# Patient Record
Sex: FEMALE | Race: WHITE | Employment: OTHER | URBAN - METROPOLITAN AREA
[De-identification: names, ages, dates, MRNs, and addresses within clinical notes are randomized per-mention and may not be internally consistent; named-entity substitution may affect disease eponyms.]

---

## 2017-05-01 ENCOUNTER — OFFICE VISIT (OUTPATIENT)
Dept: INTERNAL MEDICINE | Age: 77
End: 2017-05-01

## 2017-05-01 VITALS
TEMPERATURE: 98.6 F | BODY MASS INDEX: 30.58 KG/M2 | DIASTOLIC BLOOD PRESSURE: 80 MMHG | OXYGEN SATURATION: 98 % | RESPIRATION RATE: 14 BRPM | SYSTOLIC BLOOD PRESSURE: 132 MMHG | HEIGHT: 63 IN | HEART RATE: 80 BPM | WEIGHT: 172.6 LBS

## 2017-05-01 DIAGNOSIS — L30.1 DYSHIDROTIC ECZEMA: ICD-10-CM

## 2017-05-01 DIAGNOSIS — I10 ESSENTIAL HYPERTENSION: Primary | ICD-10-CM

## 2017-05-01 DIAGNOSIS — E03.4 HYPOTHYROIDISM DUE TO ACQUIRED ATROPHY OF THYROID: ICD-10-CM

## 2017-05-01 DIAGNOSIS — I65.23 BILATERAL CAROTID ARTERY OCCLUSION: ICD-10-CM

## 2017-05-01 DIAGNOSIS — R53.83 FATIGUE, UNSPECIFIED TYPE: ICD-10-CM

## 2017-05-01 DIAGNOSIS — E55.9 HYPOVITAMINOSIS D: ICD-10-CM

## 2017-05-01 DIAGNOSIS — E78.2 MIXED HYPERLIPIDEMIA: ICD-10-CM

## 2017-05-01 LAB
ALBUMIN SERPL-MCNC: 4.8 G/DL (ref 3.9–4.9)
ALP BLD-CCNC: 62 U/L (ref 40–130)
ALT SERPL-CCNC: 13 U/L (ref 0–33)
ANION GAP SERPL CALCULATED.3IONS-SCNC: 9 MEQ/L (ref 7–13)
AST SERPL-CCNC: 21 U/L (ref 0–35)
BASOPHILS ABSOLUTE: 0.1 K/UL (ref 0–0.2)
BASOPHILS RELATIVE PERCENT: 1 %
BILIRUB SERPL-MCNC: 0.6 MG/DL (ref 0–1.2)
BUN BLDV-MCNC: 17 MG/DL (ref 8–23)
CALCIUM SERPL-MCNC: 9.5 MG/DL (ref 8.6–10.2)
CHLORIDE BLD-SCNC: 101 MEQ/L (ref 98–107)
CHOLESTEROL, TOTAL: 195 MG/DL (ref 0–199)
CO2: 28 MEQ/L (ref 22–29)
CREAT SERPL-MCNC: 0.79 MG/DL (ref 0.5–0.9)
EOSINOPHILS ABSOLUTE: 0.1 K/UL (ref 0–0.7)
EOSINOPHILS RELATIVE PERCENT: 2.5 %
GFR AFRICAN AMERICAN: >60
GFR NON-AFRICAN AMERICAN: >60
GLOBULIN: 2.2 G/DL (ref 2.3–3.5)
GLUCOSE BLD-MCNC: 105 MG/DL (ref 74–109)
HCT VFR BLD CALC: 39.2 % (ref 37–47)
HDLC SERPL-MCNC: 87 MG/DL (ref 40–59)
HEMOGLOBIN: 13.2 G/DL (ref 12–16)
LDL CHOLESTEROL CALCULATED: 89 MG/DL (ref 0–129)
LYMPHOCYTES ABSOLUTE: 1.2 K/UL (ref 1–4.8)
LYMPHOCYTES RELATIVE PERCENT: 21.7 %
MCH RBC QN AUTO: 30.4 PG (ref 27–31.3)
MCHC RBC AUTO-ENTMCNC: 33.6 % (ref 33–37)
MCV RBC AUTO: 90.5 FL (ref 82–100)
MONOCYTES ABSOLUTE: 0.6 K/UL (ref 0.2–0.8)
MONOCYTES RELATIVE PERCENT: 10.1 %
NEUTROPHILS ABSOLUTE: 3.7 K/UL (ref 1.4–6.5)
NEUTROPHILS RELATIVE PERCENT: 64.7 %
PDW BLD-RTO: 13.3 % (ref 11.5–14.5)
PLATELET # BLD: 181 K/UL (ref 130–400)
POTASSIUM SERPL-SCNC: 4.5 MEQ/L (ref 3.5–5.1)
RBC # BLD: 4.33 M/UL (ref 4.2–5.4)
SODIUM BLD-SCNC: 138 MEQ/L (ref 132–144)
TOTAL PROTEIN: 7 G/DL (ref 6.4–8.1)
TRIGL SERPL-MCNC: 93 MG/DL (ref 0–200)
TSH SERPL DL<=0.05 MIU/L-ACNC: 1.64 UIU/ML (ref 0.27–4.2)
VITAMIN D 25-HYDROXY: 18.2 NG/ML (ref 30–100)
WBC # BLD: 5.6 K/UL (ref 4.8–10.8)

## 2017-05-01 PROCEDURE — 1123F ACP DISCUSS/DSCN MKR DOCD: CPT | Performed by: FAMILY MEDICINE

## 2017-05-01 PROCEDURE — G8399 PT W/DXA RESULTS DOCUMENT: HCPCS | Performed by: FAMILY MEDICINE

## 2017-05-01 PROCEDURE — G8417 CALC BMI ABV UP PARAM F/U: HCPCS | Performed by: FAMILY MEDICINE

## 2017-05-01 PROCEDURE — 1090F PRES/ABSN URINE INCON ASSESS: CPT | Performed by: FAMILY MEDICINE

## 2017-05-01 PROCEDURE — 99213 OFFICE O/P EST LOW 20 MIN: CPT | Performed by: FAMILY MEDICINE

## 2017-05-01 PROCEDURE — 4040F PNEUMOC VAC/ADMIN/RCVD: CPT | Performed by: FAMILY MEDICINE

## 2017-05-01 PROCEDURE — G8427 DOCREV CUR MEDS BY ELIG CLIN: HCPCS | Performed by: FAMILY MEDICINE

## 2017-05-01 PROCEDURE — 1036F TOBACCO NON-USER: CPT | Performed by: FAMILY MEDICINE

## 2017-05-01 PROCEDURE — G8599 NO ASA/ANTIPLAT THER USE RNG: HCPCS | Performed by: FAMILY MEDICINE

## 2017-05-01 RX ORDER — VITAMIN B COMPLEX
1 CAPSULE ORAL DAILY
COMMUNITY
End: 2019-01-24

## 2017-05-01 RX ORDER — BETAMETHASONE DIPROPIONATE 0.05 %
OINTMENT (GRAM) TOPICAL
Qty: 15 G | Refills: 0 | Status: SHIPPED | OUTPATIENT
Start: 2017-05-01 | End: 2019-01-24

## 2017-05-01 RX ORDER — LEVOCETIRIZINE DIHYDROCHLORIDE 5 MG/1
5 TABLET, FILM COATED ORAL NIGHTLY
Qty: 30 TABLET | Refills: 3 | Status: SHIPPED | OUTPATIENT
Start: 2017-05-01 | End: 2018-04-12 | Stop reason: SDUPTHER

## 2017-05-02 LAB — T4 FREE: 1.42 NG/DL (ref 0.93–1.7)

## 2017-05-02 RX ORDER — CHOLECALCIFEROL (VITAMIN D3) 50 MCG
4000 TABLET ORAL DAILY
Qty: 30 TABLET | Refills: 5
Start: 2017-05-02 | End: 2021-04-28

## 2017-07-31 RX ORDER — LOVASTATIN 40 MG/1
TABLET ORAL
Qty: 90 TABLET | Refills: 3 | Status: SHIPPED | OUTPATIENT
Start: 2017-07-31 | End: 2018-07-31 | Stop reason: SDUPTHER

## 2017-09-19 ENCOUNTER — OFFICE VISIT (OUTPATIENT)
Dept: INTERNAL MEDICINE | Age: 77
End: 2017-09-19

## 2017-09-19 VITALS
RESPIRATION RATE: 16 BRPM | HEART RATE: 85 BPM | BODY MASS INDEX: 29.09 KG/M2 | OXYGEN SATURATION: 97 % | SYSTOLIC BLOOD PRESSURE: 138 MMHG | WEIGHT: 164.2 LBS | DIASTOLIC BLOOD PRESSURE: 80 MMHG | TEMPERATURE: 98.2 F | HEIGHT: 63 IN

## 2017-09-19 DIAGNOSIS — J01.40 ACUTE PANSINUSITIS, RECURRENCE NOT SPECIFIED: Primary | ICD-10-CM

## 2017-09-19 PROCEDURE — 99213 OFFICE O/P EST LOW 20 MIN: CPT | Performed by: NURSE PRACTITIONER

## 2017-09-19 RX ORDER — DOXYCYCLINE HYCLATE 100 MG
100 TABLET ORAL 2 TIMES DAILY
Qty: 20 TABLET | Refills: 0 | Status: SHIPPED | OUTPATIENT
Start: 2017-09-19 | End: 2017-09-29

## 2017-09-19 ASSESSMENT — ENCOUNTER SYMPTOMS
FACIAL SWELLING: 0
SINUS PRESSURE: 1
NAUSEA: 0
EYE PAIN: 0
TROUBLE SWALLOWING: 0
RHINORRHEA: 0
WHEEZING: 0
VOMITING: 0
EYE DISCHARGE: 0
DIARRHEA: 0
SWOLLEN GLANDS: 0
ABDOMINAL PAIN: 0
COUGH: 1
SHORTNESS OF BREATH: 0
SORE THROAT: 0

## 2017-10-18 ENCOUNTER — HOSPITAL ENCOUNTER (OUTPATIENT)
Dept: ULTRASOUND IMAGING | Age: 77
Discharge: HOME | End: 2017-10-18
Payer: MEDICARE

## 2017-10-18 DIAGNOSIS — I65.23 BILATERAL CAROTID ARTERY OCCLUSION: ICD-10-CM

## 2017-10-18 PROCEDURE — 93880 EXTRACRANIAL BILAT STUDY: CPT

## 2018-01-04 ENCOUNTER — OFFICE VISIT (OUTPATIENT)
Dept: INTERNAL MEDICINE | Age: 78
End: 2018-01-04

## 2018-01-04 VITALS
HEART RATE: 75 BPM | BODY MASS INDEX: 29.06 KG/M2 | WEIGHT: 164 LBS | HEIGHT: 63 IN | OXYGEN SATURATION: 98 % | DIASTOLIC BLOOD PRESSURE: 80 MMHG | RESPIRATION RATE: 16 BRPM | SYSTOLIC BLOOD PRESSURE: 136 MMHG | TEMPERATURE: 97.8 F

## 2018-01-04 DIAGNOSIS — E78.2 MIXED HYPERLIPIDEMIA: ICD-10-CM

## 2018-01-04 DIAGNOSIS — E55.9 HYPOVITAMINOSIS D: ICD-10-CM

## 2018-01-04 DIAGNOSIS — M19.90 GENERALIZED ARTHRITIS: ICD-10-CM

## 2018-01-04 DIAGNOSIS — Z12.31 SCREENING MAMMOGRAM, ENCOUNTER FOR: ICD-10-CM

## 2018-01-04 DIAGNOSIS — Z23 NEED FOR INFLUENZA VACCINATION: ICD-10-CM

## 2018-01-04 DIAGNOSIS — E03.4 HYPOTHYROIDISM DUE TO ACQUIRED ATROPHY OF THYROID: ICD-10-CM

## 2018-01-04 DIAGNOSIS — I10 ESSENTIAL HYPERTENSION: Primary | ICD-10-CM

## 2018-01-04 LAB
ALBUMIN SERPL-MCNC: 4.9 G/DL (ref 3.9–4.9)
ALP BLD-CCNC: 62 U/L (ref 40–130)
ALT SERPL-CCNC: 12 U/L (ref 0–33)
ANION GAP SERPL CALCULATED.3IONS-SCNC: 15 MEQ/L (ref 7–13)
AST SERPL-CCNC: 19 U/L (ref 0–35)
BILIRUB SERPL-MCNC: 0.6 MG/DL (ref 0–1.2)
BUN BLDV-MCNC: 18 MG/DL (ref 8–23)
CALCIUM SERPL-MCNC: 9.7 MG/DL (ref 8.6–10.2)
CHLORIDE BLD-SCNC: 98 MEQ/L (ref 98–107)
CHOLESTEROL, TOTAL: 208 MG/DL (ref 0–199)
CO2: 27 MEQ/L (ref 22–29)
CREAT SERPL-MCNC: 0.82 MG/DL (ref 0.5–0.9)
GFR AFRICAN AMERICAN: >60
GFR NON-AFRICAN AMERICAN: >60
GLOBULIN: 2.6 G/DL (ref 2.3–3.5)
GLUCOSE BLD-MCNC: 80 MG/DL (ref 74–109)
HDLC SERPL-MCNC: 78 MG/DL (ref 40–59)
LDL CHOLESTEROL CALCULATED: 109 MG/DL (ref 0–129)
POTASSIUM SERPL-SCNC: 4 MEQ/L (ref 3.5–5.1)
SODIUM BLD-SCNC: 140 MEQ/L (ref 132–144)
TOTAL PROTEIN: 7.5 G/DL (ref 6.4–8.1)
TRIGL SERPL-MCNC: 104 MG/DL (ref 0–200)
TSH SERPL DL<=0.05 MIU/L-ACNC: 1.48 UIU/ML (ref 0.27–4.2)
VITAMIN D 25-HYDROXY: 22.6 NG/ML (ref 30–100)

## 2018-01-04 PROCEDURE — 90662 IIV NO PRSV INCREASED AG IM: CPT | Performed by: FAMILY MEDICINE

## 2018-01-04 PROCEDURE — 99214 OFFICE O/P EST MOD 30 MIN: CPT | Performed by: FAMILY MEDICINE

## 2018-01-04 PROCEDURE — G0008 ADMIN INFLUENZA VIRUS VAC: HCPCS | Performed by: FAMILY MEDICINE

## 2018-01-04 RX ORDER — LEVOTHYROXINE SODIUM 0.07 MG/1
TABLET ORAL
Qty: 90 TABLET | Refills: 3 | Status: SHIPPED | OUTPATIENT
Start: 2018-01-04 | End: 2018-12-23 | Stop reason: SDUPTHER

## 2018-01-04 RX ORDER — LISINOPRIL 5 MG/1
TABLET ORAL
Qty: 90 TABLET | Refills: 3 | Status: SHIPPED | OUTPATIENT
Start: 2018-01-04 | End: 2019-01-23 | Stop reason: SDUPTHER

## 2018-01-04 RX ORDER — IBUPROFEN 600 MG/1
600 TABLET ORAL EVERY 6 HOURS PRN
Qty: 90 TABLET | Refills: 3 | Status: SHIPPED | OUTPATIENT
Start: 2018-01-04 | End: 2019-07-24 | Stop reason: ALTCHOICE

## 2018-01-04 ASSESSMENT — PATIENT HEALTH QUESTIONNAIRE - PHQ9
SUM OF ALL RESPONSES TO PHQ9 QUESTIONS 1 & 2: 0
2. FEELING DOWN, DEPRESSED OR HOPELESS: 0
SUM OF ALL RESPONSES TO PHQ QUESTIONS 1-9: 0
1. LITTLE INTEREST OR PLEASURE IN DOING THINGS: 0

## 2018-01-05 LAB — T4 FREE: 1.45 NG/DL (ref 0.93–1.7)

## 2018-01-24 ENCOUNTER — TELEPHONE (OUTPATIENT)
Dept: INTERNAL MEDICINE CLINIC | Age: 78
End: 2018-01-24

## 2018-01-24 DIAGNOSIS — N64.4 BREAST PAIN, LEFT: Primary | ICD-10-CM

## 2018-01-25 ENCOUNTER — HOSPITAL ENCOUNTER (OUTPATIENT)
Dept: ULTRASOUND IMAGING | Age: 78
Discharge: HOME | End: 2018-01-25
Payer: MEDICARE

## 2018-01-25 ENCOUNTER — HOSPITAL ENCOUNTER (OUTPATIENT)
Dept: WOMENS IMAGING | Age: 78
Discharge: HOME | End: 2018-01-25
Payer: MEDICARE

## 2018-01-25 DIAGNOSIS — N64.4 BREAST PAIN, LEFT: ICD-10-CM

## 2018-01-25 DIAGNOSIS — Z12.31 SCREENING MAMMOGRAM, ENCOUNTER FOR: ICD-10-CM

## 2018-01-25 PROCEDURE — 76642 ULTRASOUND BREAST LIMITED: CPT

## 2018-01-25 PROCEDURE — G0279 TOMOSYNTHESIS, MAMMO: HCPCS

## 2018-02-02 ENCOUNTER — OFFICE VISIT (OUTPATIENT)
Dept: SURGERY | Age: 78
End: 2018-02-02
Payer: MEDICARE

## 2018-02-02 VITALS
BODY MASS INDEX: 28.92 KG/M2 | HEIGHT: 63 IN | DIASTOLIC BLOOD PRESSURE: 70 MMHG | TEMPERATURE: 98.4 F | SYSTOLIC BLOOD PRESSURE: 130 MMHG | WEIGHT: 163.2 LBS

## 2018-02-02 DIAGNOSIS — N64.59 INVERSION OF NIPPLE: ICD-10-CM

## 2018-02-02 DIAGNOSIS — R92.8 ABNORMAL ULTRASOUND OF BREAST: Primary | ICD-10-CM

## 2018-02-02 PROCEDURE — 99201 PR OFFICE OUTPATIENT NEW 10 MINUTES: CPT | Performed by: SURGERY

## 2018-02-09 ENCOUNTER — PREP FOR PROCEDURE (OUTPATIENT)
Dept: WOMENS IMAGING | Age: 78
End: 2018-02-09

## 2018-02-09 ENCOUNTER — HOSPITAL ENCOUNTER (OUTPATIENT)
Dept: ULTRASOUND IMAGING | Age: 78
Discharge: HOME | End: 2018-02-11
Payer: MEDICARE

## 2018-02-09 ENCOUNTER — HOSPITAL ENCOUNTER (OUTPATIENT)
Dept: WOMENS IMAGING | Age: 78
Discharge: HOME | End: 2018-02-11
Payer: MEDICARE

## 2018-02-09 VITALS — HEART RATE: 75 BPM | RESPIRATION RATE: 16 BRPM | SYSTOLIC BLOOD PRESSURE: 142 MMHG | DIASTOLIC BLOOD PRESSURE: 76 MMHG

## 2018-02-09 DIAGNOSIS — N64.59 INVERSION OF NIPPLE: ICD-10-CM

## 2018-02-09 DIAGNOSIS — R92.8 ABNORMAL ULTRASOUND OF BREAST: ICD-10-CM

## 2018-02-09 PROCEDURE — 19083 BX BREAST 1ST LESION US IMAG: CPT

## 2018-02-09 PROCEDURE — 88305 TISSUE EXAM BY PATHOLOGIST: CPT

## 2018-02-09 PROCEDURE — 19083 BX BREAST 1ST LESION US IMAG: CPT | Performed by: SURGERY

## 2018-02-09 PROCEDURE — 2500000003 HC RX 250 WO HCPCS: Performed by: SURGERY

## 2018-02-09 PROCEDURE — 2580000003 HC RX 258: Performed by: SURGERY

## 2018-02-09 PROCEDURE — 77065 DX MAMMO INCL CAD UNI: CPT

## 2018-02-09 RX ORDER — LIDOCAINE HYDROCHLORIDE 20 MG/ML
20 INJECTION, SOLUTION INFILTRATION; PERINEURAL ONCE
Status: COMPLETED | OUTPATIENT
Start: 2018-02-09 | End: 2018-02-09

## 2018-02-09 RX ORDER — LIDOCAINE HYDROCHLORIDE 20 MG/ML
20 INJECTION, SOLUTION INFILTRATION; PERINEURAL ONCE
Status: CANCELLED | OUTPATIENT
Start: 2018-02-09 | End: 2018-02-09

## 2018-02-09 RX ORDER — SODIUM CHLORIDE 9 MG/ML
INJECTION, SOLUTION INTRAVENOUS CONTINUOUS
Status: DISCONTINUED | OUTPATIENT
Start: 2018-02-09 | End: 2018-02-12 | Stop reason: HOSPADM

## 2018-02-09 RX ORDER — SODIUM CHLORIDE 9 MG/ML
INJECTION, SOLUTION INTRAVENOUS CONTINUOUS
Status: CANCELLED | OUTPATIENT
Start: 2018-02-09

## 2018-02-09 RX ADMIN — LIDOCAINE HYDROCHLORIDE 20 ML: 20 INJECTION, SOLUTION INFILTRATION; PERINEURAL at 12:30

## 2018-02-09 RX ADMIN — SODIUM CHLORIDE 1000 ML: 9 INJECTION, SOLUTION INTRAVENOUS at 11:51

## 2018-02-09 ASSESSMENT — PAIN SCALES - GENERAL: PAINLEVEL_OUTOF10: 0

## 2018-02-09 NOTE — SEDATION DOCUMENTATION
Post bx mamm completed. Discharge instructions reviewed with patient and patient verbalizes understanding. Dressing clean, dry, and intact. Denies any pain. Patient left [facility] with slow steady gait accompanied by daughter.

## 2018-02-10 NOTE — OP NOTE
[address]                       [address], [address] [facility]                                 OPERATIVE REPORT    PATIENT NAME: Hilda Hatch             :        1940  MED REC NO:   [id number]                            ROOM:  ACCOUNT NO:   [de-identified]                           ADMIT DATE: 2018  PROVIDER:     Esperanza Villa MD    DATE OF PROCEDURE:  2018    PREOPERATIVE DIAGNOSIS:  Left nipple inversion, abnormal left breast  ultrasound. POSTOPERATIVE DIAGNOSIS:  Left nipple inversion, abnormal left breast  ultrasound. OPERATION PERFORMED:  Ultrasound-guided core biopsy of the left breast.    SURGEON:  Esperanza Villa MD    ANESTHESIA:  Local.    CLINICAL NOTE:  This woman casually noted nipple inversion on the left  side. She was sent for diagnostic imaging, abnormality was noted in the  region of the nipple and biopsy was recommended. She presents today for  ultrasound-guided core biopsy. OPERATIVE PROCEDURE:  The patient is brought to the ultrasound room. She  is placed supine. She is wedged up under the left shoulder. Her left arm  remained at the side. She underwent scanning centrally behind the nipple. There was a lot of shadowing here. She was prepped with Chlorhexidine and  sterilely draped. A 2% lidocaine was used as local and was infiltrated  behind the nipple in a wide area as well as the skin. A small stab  incision was made and the 12-gauge Suros needle was advanced to the tissue  deep to the nipple and multiple core specimens were obtained. Clip was  deployed uneventfully. There was minimal bleeding controlled with  pressure. Sterile gauze and OpSite were placed. She will be called with  the pathology report.         Varinder Mcdowell MD    D: 2018 13:24:16       T: 2018 13:25:00     [id number]  Job#: [id number]     Doc#: [id number]    CC:

## 2018-04-12 RX ORDER — LEVOCETIRIZINE DIHYDROCHLORIDE 5 MG/1
5 TABLET, FILM COATED ORAL NIGHTLY
Qty: 90 TABLET | Refills: 3 | Status: SHIPPED | OUTPATIENT
Start: 2018-04-12 | End: 2021-04-28

## 2018-06-07 ENCOUNTER — OFFICE VISIT (OUTPATIENT)
Dept: INTERNAL MEDICINE CLINIC | Age: 78
End: 2018-06-07
Payer: MEDICARE

## 2018-06-07 VITALS
SYSTOLIC BLOOD PRESSURE: 132 MMHG | TEMPERATURE: 97.6 F | RESPIRATION RATE: 16 BRPM | DIASTOLIC BLOOD PRESSURE: 66 MMHG | HEIGHT: 63 IN | HEART RATE: 73 BPM | OXYGEN SATURATION: 96 % | BODY MASS INDEX: 29.52 KG/M2 | WEIGHT: 166.6 LBS

## 2018-06-07 DIAGNOSIS — J30.2 CHRONIC SEASONAL ALLERGIC RHINITIS DUE TO FUNGAL SPORES: Primary | ICD-10-CM

## 2018-06-07 DIAGNOSIS — J06.9 URI, ACUTE: ICD-10-CM

## 2018-06-07 PROCEDURE — 99213 OFFICE O/P EST LOW 20 MIN: CPT | Performed by: PHYSICIAN ASSISTANT

## 2018-06-07 RX ORDER — MOMETASONE FUROATE 50 UG/1
2 SPRAY, METERED NASAL DAILY
Qty: 1 INHALER | Refills: 3 | Status: SHIPPED | OUTPATIENT
Start: 2018-06-07 | End: 2018-11-11 | Stop reason: SDUPTHER

## 2018-06-07 RX ORDER — AZITHROMYCIN 250 MG/1
TABLET, FILM COATED ORAL
Qty: 1 PACKET | Refills: 0 | Status: SHIPPED | OUTPATIENT
Start: 2018-06-07 | End: 2018-06-11

## 2018-06-07 ASSESSMENT — ENCOUNTER SYMPTOMS
SHORTNESS OF BREATH: 0
SPUTUM PRODUCTION: 1
SORE THROAT: 0
BLURRED VISION: 0
HEARTBURN: 0
BACK PAIN: 0
COUGH: 1
ABDOMINAL PAIN: 0
VOMITING: 0
EYE PAIN: 0

## 2018-07-10 ENCOUNTER — OFFICE VISIT (OUTPATIENT)
Dept: INTERNAL MEDICINE CLINIC | Age: 78
End: 2018-07-10
Payer: MEDICARE

## 2018-07-10 VITALS
DIASTOLIC BLOOD PRESSURE: 70 MMHG | HEIGHT: 63 IN | WEIGHT: 169 LBS | OXYGEN SATURATION: 98 % | HEART RATE: 70 BPM | BODY MASS INDEX: 29.95 KG/M2 | RESPIRATION RATE: 16 BRPM | TEMPERATURE: 97.6 F | SYSTOLIC BLOOD PRESSURE: 124 MMHG

## 2018-07-10 DIAGNOSIS — E03.4 HYPOTHYROIDISM DUE TO ACQUIRED ATROPHY OF THYROID: ICD-10-CM

## 2018-07-10 DIAGNOSIS — I10 ESSENTIAL HYPERTENSION: ICD-10-CM

## 2018-07-10 DIAGNOSIS — R92.8 ABNORMAL MAMMOGRAM OF LEFT BREAST: ICD-10-CM

## 2018-07-10 DIAGNOSIS — E55.9 HYPOVITAMINOSIS D: ICD-10-CM

## 2018-07-10 DIAGNOSIS — E78.2 MIXED HYPERLIPIDEMIA: ICD-10-CM

## 2018-07-10 DIAGNOSIS — M17.12 PRIMARY OSTEOARTHRITIS OF LEFT KNEE: ICD-10-CM

## 2018-07-10 DIAGNOSIS — E78.2 MIXED HYPERLIPIDEMIA: Primary | ICD-10-CM

## 2018-07-10 LAB
ALBUMIN SERPL-MCNC: 4.7 G/DL (ref 3.9–4.9)
ALP BLD-CCNC: 55 U/L (ref 40–130)
ALT SERPL-CCNC: 15 U/L (ref 0–33)
ANION GAP SERPL CALCULATED.3IONS-SCNC: 13 MEQ/L (ref 7–13)
AST SERPL-CCNC: 23 U/L (ref 0–35)
BILIRUB SERPL-MCNC: 0.5 MG/DL (ref 0–1.2)
BUN BLDV-MCNC: 21 MG/DL (ref 8–23)
CALCIUM SERPL-MCNC: 9.4 MG/DL (ref 8.6–10.2)
CHLORIDE BLD-SCNC: 102 MEQ/L (ref 98–107)
CHOLESTEROL, TOTAL: 178 MG/DL (ref 0–199)
CO2: 26 MEQ/L (ref 22–29)
CREAT SERPL-MCNC: 0.65 MG/DL (ref 0.5–0.9)
GFR AFRICAN AMERICAN: >60
GFR NON-AFRICAN AMERICAN: >60
GLOBULIN: 2.5 G/DL (ref 2.3–3.5)
GLUCOSE BLD-MCNC: 95 MG/DL (ref 74–109)
HDLC SERPL-MCNC: 80 MG/DL (ref 40–59)
LDL CHOLESTEROL CALCULATED: 84 MG/DL (ref 0–129)
POTASSIUM SERPL-SCNC: 4.4 MEQ/L (ref 3.5–5.1)
SODIUM BLD-SCNC: 141 MEQ/L (ref 132–144)
T4 FREE: 1.29 NG/DL (ref 0.93–1.7)
TOTAL PROTEIN: 7.2 G/DL (ref 6.4–8.1)
TRIGL SERPL-MCNC: 68 MG/DL (ref 0–200)
TSH SERPL DL<=0.05 MIU/L-ACNC: 1.27 UIU/ML (ref 0.27–4.2)

## 2018-07-10 PROCEDURE — 99214 OFFICE O/P EST MOD 30 MIN: CPT | Performed by: FAMILY MEDICINE

## 2018-07-10 NOTE — PROGRESS NOTES
Patient: Rexine Hatchet    YOB: 1940    Date: 7/10/18    Chief Complaint   Patient presents with    Hypertension     6 month follow up. She is due for lab work.  Hyperlipidemia     6 month follow up    Hypothyroidism     6 month follow up    Knee Pain     She complains of left knee pain. Patient Active Problem List    Diagnosis Date Noted    Abnormal ultrasound of breast     Inversion of nipple     Fibrocystic disease of left breast     Generalized arthritis 01/04/2018    Dyshidrotic eczema 05/21/2015    Hypovitaminosis D 05/06/2014    Anxiety 10/16/2012    Essential hypertension     Mixed hyperlipidemia     Hypothyroidism     Carpal tunnel syndrome, bilateral     DJD (degenerative joint disease)        Allergies   Allergen Reactions    Seasonal        Vitals:    07/10/18 0944   BP: 124/70   Site: Right Arm   Position: Sitting   Cuff Size: Small Adult   Pulse: 70   Resp: 16   Temp: 97.6 °F (36.4 °C)   TempSrc: Oral   SpO2: 98%   Weight: 169 lb (76.7 kg)   Height: 5' 2.5\" (1.588 m)      Body mass index is 30.42 kg/m². HPI    She is here to follow-up on her hypertension her high cholesterol and her thyroid disease. She notes that her left knee has been bothering her more than it had the past.  She did have a right knee replacement has not that bothersome although it does make [address] sometimes. She doesn't have any instability in her knees and not locking or catching that just painful and if she uses her icy hot sports rub it helps. She is concerned because the left nipple on her breast is still retracted. She did have a biopsy which came back negative but she's concerned as the nipple irregularity persists. She has no pain no discharge no other skin changes but she finds the breast is more tender now. Review of Systems    Constitutional: Negative for fatigue, fever and sweats. HEENT: Negative for eye discharge and vision loss.  Negative for ear drainage, hearing loss and nasal drainage. Respiratory: Negative for cough, dyspnea and wheezing. Cardiovascular:  Negative for chest pain, claudication and irregular heartbeat/palpitations. Gastrointestinal: Negative for abdominal pain, nausea, constipation and diarrhea. Genitourinary: Negative for dysuria, patient had hysterectomy. Metabolic/Endocrine: Negative for cold intolerance, heat intolerance, polydipsia and polyphagia. No unintended weight loss or weight gain. Neuro/Psychiatric: Negative for gait disturbance. Negative for psychiatric symptoms. Dermatologic: Negative for pruritus and positive rash. Musculoskeletal: positive for bone/joint symptoms. No numbness or tingling. No loss of function. Hematology: Negative for bleeding and easy bruising. Immunology:  Negative for environmental allergies and food allergies. Physical Exam    Patient's medication, allergies, past medical, surgical, social and family histories were reviewed and updated as appropriate. PHYSICAL EXAM   General Appearance: Alert oriented pleasant cooperative in no acute distress. HEENT: Eyes clear nonicteric facial muscles symmetrical.  Hearing comprehension normal  Neck: Soft nontender, no adenopathy, no carotid bruits. Lungs: Clear to auscultation, no wheezes rhonchi or rales. Heart: Regular rate and rhythm without murmurs rubs or gallops. Extremities: No rashes or edema. Right knee has no effusion no erythema no point tenderness she has a slightly valgus deformity occurring in the knee. Left breast: She does have a nipple retraction she has some fullness in the upper quadrant around the areola. No skin changes no masses no adenopathy and no discharge. Assessment:   Diagnosis Orders   1. Mixed hyperlipidemia  Lipid Panel   2. Hypothyroidism due to acquired atrophy of thyroid  TSH Without Reflex    T4, Free   3. Essential hypertension  Comprehensive Metabolic Panel  Controlled    4. Hypovitaminosis D     5.

## 2018-07-31 RX ORDER — LOVASTATIN 40 MG/1
TABLET ORAL
Qty: 90 TABLET | Refills: 3 | Status: SHIPPED | OUTPATIENT
Start: 2018-07-31 | End: 2019-07-19 | Stop reason: SDUPTHER

## 2018-07-31 NOTE — TELEPHONE ENCOUNTER
Rx requested:  Requested Prescriptions     Pending Prescriptions Disp Refills    lovastatin (MEVACOR) 40 MG tablet [Pharmacy Med Name: LOVASTATIN 40 MG TABLET] 90 tablet 3     Sig: TAKE 1 TABLET BY MOUTH NIGHTLY       Last Office Visit:   7/10/2018        Next Visit Date:  Future Appointments  Date Time Provider Amy Rush   8/9/2018 10:30 AM [place] [facility] ROOM 2 [facility] RAD   1/10/2019 9:15 AM Tim Zapata MD [place] [id number]

## 2018-08-09 ENCOUNTER — HOSPITAL ENCOUNTER (OUTPATIENT)
Dept: WOMENS IMAGING | Age: 78
Discharge: HOME | End: 2018-08-11
Payer: MEDICARE

## 2018-08-09 DIAGNOSIS — R92.8 ABNORMAL MAMMOGRAM OF LEFT BREAST: ICD-10-CM

## 2018-08-09 PROCEDURE — 77065 DX MAMMO INCL CAD UNI: CPT

## 2018-10-11 ENCOUNTER — TELEPHONE (OUTPATIENT)
Dept: INTERNAL MEDICINE CLINIC | Age: 78
End: 2018-10-11

## 2018-11-13 RX ORDER — MOMETASONE FUROATE 50 UG/1
SPRAY, METERED NASAL
Qty: 17 G | Refills: 3 | Status: SHIPPED | OUTPATIENT
Start: 2018-11-13 | End: 2020-07-22

## 2018-12-23 DIAGNOSIS — E03.4 HYPOTHYROIDISM DUE TO ACQUIRED ATROPHY OF THYROID: ICD-10-CM

## 2018-12-24 RX ORDER — LEVOTHYROXINE SODIUM 0.07 MG/1
TABLET ORAL
Qty: 90 TABLET | Refills: 0 | Status: SHIPPED | OUTPATIENT
Start: 2018-12-24 | End: 2019-01-02 | Stop reason: SDUPTHER

## 2019-01-23 DIAGNOSIS — I10 ESSENTIAL HYPERTENSION: ICD-10-CM

## 2019-01-23 RX ORDER — LISINOPRIL 5 MG/1
TABLET ORAL
Qty: 90 TABLET | Refills: 3 | Status: SHIPPED | OUTPATIENT
Start: 2019-01-23 | End: 2020-01-22 | Stop reason: SDUPTHER

## 2019-01-24 ENCOUNTER — OFFICE VISIT (OUTPATIENT)
Dept: INTERNAL MEDICINE CLINIC | Age: 79
End: 2019-01-24
Payer: MEDICARE

## 2019-01-24 VITALS
TEMPERATURE: 97.5 F | SYSTOLIC BLOOD PRESSURE: 134 MMHG | HEIGHT: 63 IN | HEART RATE: 70 BPM | OXYGEN SATURATION: 99 % | WEIGHT: 171.2 LBS | RESPIRATION RATE: 16 BRPM | DIASTOLIC BLOOD PRESSURE: 74 MMHG | BODY MASS INDEX: 30.33 KG/M2

## 2019-01-24 DIAGNOSIS — M25.571 ACUTE RIGHT ANKLE PAIN: ICD-10-CM

## 2019-01-24 DIAGNOSIS — E55.9 HYPOVITAMINOSIS D: ICD-10-CM

## 2019-01-24 DIAGNOSIS — E03.4 HYPOTHYROIDISM DUE TO ACQUIRED ATROPHY OF THYROID: ICD-10-CM

## 2019-01-24 DIAGNOSIS — I10 ESSENTIAL HYPERTENSION: Primary | ICD-10-CM

## 2019-01-24 DIAGNOSIS — I10 ESSENTIAL HYPERTENSION: ICD-10-CM

## 2019-01-24 DIAGNOSIS — E78.2 MIXED HYPERLIPIDEMIA: ICD-10-CM

## 2019-01-24 LAB
ALBUMIN SERPL-MCNC: 5 G/DL (ref 3.9–4.9)
ALP BLD-CCNC: 62 U/L (ref 40–130)
ALT SERPL-CCNC: 14 U/L (ref 0–33)
ANION GAP SERPL CALCULATED.3IONS-SCNC: 15 MEQ/L (ref 7–13)
AST SERPL-CCNC: 23 U/L (ref 0–35)
BILIRUB SERPL-MCNC: 0.6 MG/DL (ref 0–1.2)
BUN BLDV-MCNC: 20 MG/DL (ref 8–23)
CALCIUM SERPL-MCNC: 9.6 MG/DL (ref 8.6–10.2)
CHLORIDE BLD-SCNC: 98 MEQ/L (ref 98–107)
CHOLESTEROL, TOTAL: 182 MG/DL (ref 0–199)
CO2: 26 MEQ/L (ref 22–29)
CREAT SERPL-MCNC: 0.76 MG/DL (ref 0.5–0.9)
GFR AFRICAN AMERICAN: >60
GFR NON-AFRICAN AMERICAN: >60
GLOBULIN: 2.7 G/DL (ref 2.3–3.5)
GLUCOSE BLD-MCNC: 102 MG/DL (ref 74–109)
HDLC SERPL-MCNC: 86 MG/DL (ref 40–59)
LDL CHOLESTEROL CALCULATED: 81 MG/DL (ref 0–129)
POTASSIUM SERPL-SCNC: 4.3 MEQ/L (ref 3.5–5.1)
SODIUM BLD-SCNC: 139 MEQ/L (ref 132–144)
T4 FREE: 1.45 NG/DL (ref 0.93–1.7)
TOTAL PROTEIN: 7.7 G/DL (ref 6.4–8.1)
TRIGL SERPL-MCNC: 77 MG/DL (ref 0–200)
TSH SERPL DL<=0.05 MIU/L-ACNC: 1.41 UIU/ML (ref 0.27–4.2)
VITAMIN D 25-HYDROXY: 24.9 NG/ML (ref 30–100)

## 2019-01-24 PROCEDURE — 99214 OFFICE O/P EST MOD 30 MIN: CPT | Performed by: FAMILY MEDICINE

## 2019-01-24 ASSESSMENT — PATIENT HEALTH QUESTIONNAIRE - PHQ9
SUM OF ALL RESPONSES TO PHQ QUESTIONS 1-9: 0
2. FEELING DOWN, DEPRESSED OR HOPELESS: 0
1. LITTLE INTEREST OR PLEASURE IN DOING THINGS: 0
SUM OF ALL RESPONSES TO PHQ9 QUESTIONS 1 & 2: 0
SUM OF ALL RESPONSES TO PHQ QUESTIONS 1-9: 0

## 2019-07-19 RX ORDER — LOVASTATIN 40 MG/1
TABLET ORAL
Qty: 90 TABLET | Refills: 3 | Status: SHIPPED | OUTPATIENT
Start: 2019-07-19 | End: 2020-07-22 | Stop reason: SDUPTHER

## 2019-07-24 ENCOUNTER — OFFICE VISIT (OUTPATIENT)
Dept: FAMILY MEDICINE CLINIC | Age: 79
End: 2019-07-24
Payer: MEDICARE

## 2019-07-24 VITALS
HEART RATE: 72 BPM | BODY MASS INDEX: 31.01 KG/M2 | OXYGEN SATURATION: 98 % | WEIGHT: 175 LBS | DIASTOLIC BLOOD PRESSURE: 72 MMHG | TEMPERATURE: 97.6 F | HEIGHT: 63 IN | SYSTOLIC BLOOD PRESSURE: 128 MMHG

## 2019-07-24 DIAGNOSIS — E03.4 HYPOTHYROIDISM DUE TO ACQUIRED ATROPHY OF THYROID: ICD-10-CM

## 2019-07-24 DIAGNOSIS — R60.0 PEDAL EDEMA: ICD-10-CM

## 2019-07-24 DIAGNOSIS — E55.9 HYPOVITAMINOSIS D: ICD-10-CM

## 2019-07-24 DIAGNOSIS — I10 ESSENTIAL HYPERTENSION: ICD-10-CM

## 2019-07-24 DIAGNOSIS — I10 ESSENTIAL HYPERTENSION: Primary | ICD-10-CM

## 2019-07-24 DIAGNOSIS — Z12.39 BREAST CANCER SCREENING: ICD-10-CM

## 2019-07-24 LAB
ALBUMIN SERPL-MCNC: 4.7 G/DL (ref 3.5–4.6)
ALP BLD-CCNC: 62 U/L (ref 40–130)
ALT SERPL-CCNC: 14 U/L (ref 0–33)
ANION GAP SERPL CALCULATED.3IONS-SCNC: 12 MEQ/L (ref 9–15)
AST SERPL-CCNC: 23 U/L (ref 0–35)
BILIRUB SERPL-MCNC: 0.5 MG/DL (ref 0.2–0.7)
BUN BLDV-MCNC: 16 MG/DL (ref 8–23)
CALCIUM SERPL-MCNC: 9.1 MG/DL (ref 8.5–9.9)
CHLORIDE BLD-SCNC: 104 MEQ/L (ref 95–107)
CO2: 25 MEQ/L (ref 20–31)
CREAT SERPL-MCNC: 0.81 MG/DL (ref 0.5–0.9)
GFR AFRICAN AMERICAN: >60
GFR NON-AFRICAN AMERICAN: >60
GLOBULIN: 2.6 G/DL (ref 2.3–3.5)
GLUCOSE BLD-MCNC: 102 MG/DL (ref 70–99)
POTASSIUM SERPL-SCNC: 4.3 MEQ/L (ref 3.4–4.9)
SODIUM BLD-SCNC: 141 MEQ/L (ref 135–144)
T4 FREE: 1.23 NG/DL (ref 0.84–1.68)
TOTAL PROTEIN: 7.3 G/DL (ref 6.3–8)
TSH SERPL DL<=0.05 MIU/L-ACNC: 2.49 UIU/ML (ref 0.44–3.86)

## 2019-07-24 PROCEDURE — 99214 OFFICE O/P EST MOD 30 MIN: CPT | Performed by: FAMILY MEDICINE

## 2019-07-24 RX ORDER — ACETAMINOPHEN 500 MG
500 TABLET ORAL EVERY 6 HOURS PRN
COMMUNITY
End: 2019-11-11

## 2019-08-14 ENCOUNTER — TELEPHONE (OUTPATIENT)
Dept: ADMINISTRATIVE | Age: 79
End: 2019-08-14

## 2019-11-11 ENCOUNTER — OFFICE VISIT (OUTPATIENT)
Dept: FAMILY MEDICINE CLINIC | Age: 79
End: 2019-11-11
Payer: MEDICARE

## 2019-11-11 VITALS
TEMPERATURE: 97.5 F | SYSTOLIC BLOOD PRESSURE: 126 MMHG | RESPIRATION RATE: 12 BRPM | HEART RATE: 92 BPM | BODY MASS INDEX: 30.83 KG/M2 | DIASTOLIC BLOOD PRESSURE: 78 MMHG | OXYGEN SATURATION: 95 % | HEIGHT: 63 IN | WEIGHT: 174 LBS

## 2019-11-11 DIAGNOSIS — N30.90 CYSTITIS: Primary | ICD-10-CM

## 2019-11-11 DIAGNOSIS — R30.0 DYSURIA: ICD-10-CM

## 2019-11-11 LAB
BILIRUBIN, POC: NORMAL
BLOOD URINE, POC: NORMAL
CLARITY, POC: NORMAL
COLOR, POC: YELLOW
GLUCOSE URINE, POC: NORMAL
KETONES, POC: NORMAL
LEUKOCYTE EST, POC: NORMAL
NITRITE, POC: NORMAL
PH, POC: 6
PROTEIN, POC: NORMAL
SPECIFIC GRAVITY, POC: 1.01
UROBILINOGEN, POC: 3.5

## 2019-11-11 PROCEDURE — 99213 OFFICE O/P EST LOW 20 MIN: CPT | Performed by: FAMILY MEDICINE

## 2019-11-11 PROCEDURE — 81002 URINALYSIS NONAUTO W/O SCOPE: CPT | Performed by: FAMILY MEDICINE

## 2019-11-11 RX ORDER — CIPROFLOXACIN 500 MG/1
500 TABLET, FILM COATED ORAL 2 TIMES DAILY
Qty: 20 TABLET | Refills: 0 | Status: SHIPPED | OUTPATIENT
Start: 2019-11-11 | End: 2019-11-21

## 2019-11-14 LAB
ORGANISM: ABNORMAL
URINE CULTURE, ROUTINE: ABNORMAL

## 2019-12-24 ENCOUNTER — TELEPHONE (OUTPATIENT)
Dept: WOMENS IMAGING | Age: 79
End: 2019-12-24

## 2020-01-13 RX ORDER — LEVOTHYROXINE SODIUM 0.07 MG/1
TABLET ORAL
Qty: 90 TABLET | Refills: 3 | Status: SHIPPED | OUTPATIENT
Start: 2020-01-13 | End: 2021-01-29

## 2020-01-22 ENCOUNTER — OFFICE VISIT (OUTPATIENT)
Dept: FAMILY MEDICINE CLINIC | Age: 80
End: 2020-01-22
Payer: MEDICARE

## 2020-01-22 VITALS
OXYGEN SATURATION: 99 % | HEIGHT: 62 IN | DIASTOLIC BLOOD PRESSURE: 70 MMHG | TEMPERATURE: 97.4 F | BODY MASS INDEX: 32.02 KG/M2 | RESPIRATION RATE: 16 BRPM | SYSTOLIC BLOOD PRESSURE: 136 MMHG | HEART RATE: 79 BPM | WEIGHT: 174 LBS

## 2020-01-22 DIAGNOSIS — E78.2 MIXED HYPERLIPIDEMIA: ICD-10-CM

## 2020-01-22 DIAGNOSIS — E03.4 HYPOTHYROIDISM DUE TO ACQUIRED ATROPHY OF THYROID: ICD-10-CM

## 2020-01-22 DIAGNOSIS — E55.9 HYPOVITAMINOSIS D: ICD-10-CM

## 2020-01-22 DIAGNOSIS — I10 ESSENTIAL HYPERTENSION: ICD-10-CM

## 2020-01-22 LAB
ALBUMIN SERPL-MCNC: 4.7 G/DL (ref 3.5–4.6)
ALP BLD-CCNC: 58 U/L (ref 40–130)
ALT SERPL-CCNC: 13 U/L (ref 0–33)
ANION GAP SERPL CALCULATED.3IONS-SCNC: 15 MEQ/L (ref 9–15)
AST SERPL-CCNC: 20 U/L (ref 0–35)
BILIRUB SERPL-MCNC: 0.4 MG/DL (ref 0.2–0.7)
BUN BLDV-MCNC: 21 MG/DL (ref 8–23)
CALCIUM SERPL-MCNC: 9.5 MG/DL (ref 8.5–9.9)
CHLORIDE BLD-SCNC: 100 MEQ/L (ref 95–107)
CHOLESTEROL, TOTAL: 183 MG/DL (ref 0–199)
CO2: 26 MEQ/L (ref 20–31)
CREAT SERPL-MCNC: 0.8 MG/DL (ref 0.5–0.9)
GFR AFRICAN AMERICAN: >60
GFR NON-AFRICAN AMERICAN: >60
GLOBULIN: 2.6 G/DL (ref 2.3–3.5)
GLUCOSE BLD-MCNC: 95 MG/DL (ref 70–99)
HDLC SERPL-MCNC: 88 MG/DL (ref 40–59)
LDL CHOLESTEROL CALCULATED: 79 MG/DL (ref 0–129)
POTASSIUM SERPL-SCNC: 4.4 MEQ/L (ref 3.4–4.9)
SODIUM BLD-SCNC: 141 MEQ/L (ref 135–144)
T4 FREE: 1.26 NG/DL (ref 0.84–1.68)
TOTAL PROTEIN: 7.3 G/DL (ref 6.3–8)
TRIGL SERPL-MCNC: 80 MG/DL (ref 0–150)
TSH SERPL DL<=0.05 MIU/L-ACNC: 2 UIU/ML (ref 0.44–3.86)
VITAMIN D 25-HYDROXY: 26.5 NG/ML (ref 30–100)

## 2020-01-22 PROCEDURE — 99213 OFFICE O/P EST LOW 20 MIN: CPT | Performed by: FAMILY MEDICINE

## 2020-01-22 RX ORDER — LISINOPRIL 5 MG/1
TABLET ORAL
Qty: 90 TABLET | Refills: 3 | Status: SHIPPED | OUTPATIENT
Start: 2020-01-22 | End: 2021-02-04 | Stop reason: SDUPTHER

## 2020-01-22 ASSESSMENT — PATIENT HEALTH QUESTIONNAIRE - PHQ9
1. LITTLE INTEREST OR PLEASURE IN DOING THINGS: 0
SUM OF ALL RESPONSES TO PHQ9 QUESTIONS 1 & 2: 0
2. FEELING DOWN, DEPRESSED OR HOPELESS: 0
SUM OF ALL RESPONSES TO PHQ QUESTIONS 1-9: 0
SUM OF ALL RESPONSES TO PHQ QUESTIONS 1-9: 0

## 2020-01-22 NOTE — PROGRESS NOTES
Patient: Zoya East    YOB: 1940    Date: 1/22/20    Chief Complaint   Patient presents with    Hypertension     6 month follow up. She is due for labs. She is doing well.  Hyperlipidemia     6 month follow up    Hypothyroidism     6 month follow up       Patient Active Problem List    Diagnosis Date Noted    Generalized arthritis 01/04/2018    Dyshidrotic eczema 05/21/2015    Hypovitaminosis D 05/06/2014    Anxiety 10/16/2012    Essential hypertension     Mixed hyperlipidemia     Hypothyroidism     Carpal tunnel syndrome, bilateral     DJD (degenerative joint disease)        Allergies   Allergen Reactions    Seasonal        Vitals:    01/22/20 0905   BP: 136/70   Site: Left Upper Arm   Position: Sitting   Cuff Size: Large Adult   Pulse: 79   Resp: 16   Temp: 97.4 °F (36.3 °C)   TempSrc: Oral   SpO2: 99%   Weight: 174 lb (78.9 kg)   Height: 5' 2\" (1.575 m)      Body mass index is 31.83 kg/m². HPI    She is here today to follow-up on her hypertension and her lipids and her thyroid disease. Review of systems is unremarkable and she is feeling well except that her hands bother her sometimes. Yesterday at night when she gets up in the be stiff. She wears arthritis compression gloves and that helps. No redness no point tenderness no injury. No weakness. She is otherwise without complaints. Review of Systems    Constitutional: Negative for fatigue, fever and sweats. HEENT: Negative for eye discharge and vision loss. Negative for ear drainage, hearing loss and positive for nasal drainage. Respiratory: Negative for cough, dyspnea and wheezing. Cardiovascular:  Negative for chest pain, claudication and irregular heartbeat/palpitations. Gastrointestinal: Negative for abdominal pain, nausea, constipation and diarrhea. Genitourinary: Negative for dysuria, patient had a hysterectomy.   Metabolic/Endocrine: Negative for cold intolerance, heat intolerance, polydipsia and polyphagia. No unintended weight loss or weight gain. Neuro/Psychiatric: Negative for gait disturbance. Negative for psychiatric symptoms. Dermatologic: Negative for pruritus and rash. Musculoskeletal:positive for bone/joint symptoms. No numbness or tingling. No loss of function. Hematology: Negative for bleeding and easy bruising. Immunology:  Negative for environmental allergies and food allergies. Physical Exam    Patient's medication, allergies, past medical, surgical, social and family histories were reviewed and updated as appropriate. PHYSICAL EXAM   General appearance: alert oriented pleasant cooperative in no acute distress. HEENT: Eyes clear nonicteric facial muscles symmetrical.  Color good comprehension good  Neck: Soft nontender no adenopathy, no thyroid enlargement, no carotid bruits  Lungs: Clear to auscultation without wheezes rhonchi or rales  Heart: Regular rate and rhythm without murmurs rubs or gallops  Extremities: No rashes or edema globally neurologically intact. Hands she has no significant deformities no synovitis no erythema no edema she has normal range of motion of her hands. Assessment:   Diagnosis Orders   1. Essential hypertension  lisinopril (PRINIVIL;ZESTRIL) 5 MG tablet    Comprehensive Metabolic Panel   2. Mixed hyperlipidemia  Lipid Panel   3. Hypothyroidism due to acquired atrophy of thyroid  TSH Without Reflex    T4, Free   4. Hypovitaminosis D  Vitamin D 25 Hydroxy   5. Generalized arthritis   continue with heat compression gloves Tylenol and gentle range of motion and stretching for her hands.   If symptoms become interfering with her ADLs are intolerable she should let me know               Plan:  Current Outpatient Medications   Medication Sig Dispense Refill    lisinopril (PRINIVIL;ZESTRIL) 5 MG tablet TAKE 1 TABLET BY MOUTH DAILY 90 tablet 3    levothyroxine (SYNTHROID) 75 MCG tablet TAKE 1 TABLET BY MOUTH EVERY DAY 90 tablet 3    lovastatin (MEVACOR) 40 MG tablet TAKE 1 TABLET BY MOUTH EVERY DAY AT NIGHT 90 tablet 3    mometasone (NASONEX) 50 MCG/ACT nasal spray SPRAY 2 SPRAYS INTO EACH NOSTRIL EVERY DAY 17 g 3    levocetirizine (XYZAL) 5 MG tablet Take 1 tablet by mouth nightly 90 tablet 3    Cholecalciferol (VITAMIN D) 2000 UNITS TABS tablet Take 2 tablets by mouth daily 30 tablet 5     No current facility-administered medications for this visit. Orders Placed This Encounter   Procedures    Comprehensive Metabolic Panel     Standing Status:   Future     Number of Occurrences:   1     Standing Expiration Date:   1/21/2021    Lipid Panel     Standing Status:   Future     Number of Occurrences:   1     Standing Expiration Date:   1/21/2021     Order Specific Question:   Is Patient Fasting?/# of Hours     Answer:   unknown    TSH Without Reflex     Standing Status:   Future     Number of Occurrences:   1     Standing Expiration Date:   1/22/2021    T4, Free     Standing Status:   Future     Number of Occurrences:   1     Standing Expiration Date:   1/21/2021    Vitamin D 25 Hydroxy     Standing Status:   Future     Number of Occurrences:   1     Standing Expiration Date:   1/21/2021       Orders Placed This Encounter   Medications    lisinopril (PRINIVIL;ZESTRIL) 5 MG tablet     Sig: TAKE 1 TABLET BY MOUTH DAILY     Dispense:  90 tablet     Refill:  3              Return in about 6 months (around 7/22/2020).     Dr. Susan Horowitz      1/22/20  10:04 AM

## 2020-05-18 ENCOUNTER — TELEPHONE (OUTPATIENT)
Dept: FAMILY MEDICINE CLINIC | Age: 80
End: 2020-05-18

## 2020-07-22 ENCOUNTER — VIRTUAL VISIT (OUTPATIENT)
Dept: FAMILY MEDICINE CLINIC | Age: 80
End: 2020-07-22
Payer: MEDICARE

## 2020-07-22 PROCEDURE — 99443 PR PHYS/QHP TELEPHONE EVALUATION 21-30 MIN: CPT | Performed by: FAMILY MEDICINE

## 2020-07-22 RX ORDER — LOVASTATIN 40 MG/1
TABLET ORAL
Qty: 90 TABLET | Refills: 3 | Status: SHIPPED | OUTPATIENT
Start: 2020-07-22 | End: 2021-04-26 | Stop reason: SDUPTHER

## 2020-07-22 RX ORDER — ZOSTER VACCINE RECOMBINANT, ADJUVANTED 50 MCG/0.5
0.5 KIT INTRAMUSCULAR SEE ADMIN INSTRUCTIONS
Qty: 0.5 ML | Refills: 1 | Status: SHIPPED | OUTPATIENT
Start: 2020-07-22 | End: 2021-01-18

## 2020-07-22 NOTE — PROGRESS NOTES
Patient: Humberto Garber    YOB: 1940    Date: 7/22/20    Chief Complaint   Patient presents with    Hyperlipidemia     6 month follow up. She is due for labs and mammogram.     Hypertension     6 month follow up    Hypothyroidism     6 month follow up    Swelling     She complains of bilateral swelling in her ankles. Patient Active Problem List    Diagnosis Date Noted    Generalized arthritis 01/04/2018    Dyshidrotic eczema 05/21/2015    Hypovitaminosis D 05/06/2014    Anxiety 10/16/2012    Essential hypertension     Mixed hyperlipidemia     Hypothyroidism     Carpal tunnel syndrome, bilateral     DJD (degenerative joint disease)        Allergies   Allergen Reactions    Seasonal            There were no vitals filed for this visit. There is no height or weight on file to calculate BMI. HPI    TELEHEALTH EVALUATION -- Audio/Visual (During XPCDM-05 public health emergency        Due to COVID 19 outbreak, patient's office visit was converted to a virtual visit. The patient was identified at the start of the visit. Patient was contacted and agreed to proceed with a virtual visit via Telephone Visit Time spent in visit with management in direct patient care 24 minutes. The risks and benefits of converting to a virtual visit were discussed in light of the current infectious disease epidemic. Patient also understood that insurance coverage and co-pays are up to their individual insurance plans and this is a billable visit. Pursuant to the emergency declaration under the [facility], [facility] waiver authority and the [employer] General Act, this Virtual  Visit was conducted, with patient's consent, to reduce the patient's risk of exposure to COVID-19 and provide continuity of care for an established patient.     Services were provided through a phone discussion virtually to substitute for in-person clinic visit. The patient was located home and myself, the provider is located home. Patient kamaljit de la rosa (:  36 ) has requested an audio/video evaluation for the following concern(s):     HPI:  Pt well. Caught her right leg in the car door several weeks ago and it is still sore. Walks OK. No swelling or bruising. Review of Systems    Constitutional: Negative for fatigue, fever and sweats. HEENT: Negative for eye discharge and vision loss. Negative for ear drainage, hearing loss and nasal drainage. Respiratory: Negative for cough, dyspnea and wheezing. Cardiovascular:  Negative for chest pain, claudication and irregular heartbeat/palpitations. Gastrointestinal: Negative for abdominal pain, nausea, constipation and diarrhea. Genitourinary: Negative for dysuria, hematuria, polyuria, dysmenorrhea, menorrhagia and vaginal discharge. Metabolic/Endocrine: Negative for cold intolerance, heat intolerance, polydipsia and polyphagia. No unintended weight loss or weight gain. Neuro/Psychiatric: Negative for gait disturbance. Negative for psychiatric symptoms. Dermatologic: Negative for pruritus and rash. Musculoskeletal: Negative for bone/joint symptoms. No numbness or tingling. No loss of function. Hematology: Negative for bleeding and easy bruising. Immunology:  Negative for environmental allergies and food allergies. Physical Exam  Not able to be done as this was a phone visit. Patient was alert, oriented, appropriate, not SOB with talking and not in distress. Assessment:   Diagnosis Orders   1. Essential hypertension  Comprehensive Metabolic Panel   2. Mixed hyperlipidemia  Comprehensive Metabolic Panel    Lipid, Fasting   3. Hypothyroidism due to acquired atrophy of thyroid  TSH Without Reflex   4.  Visit for screening mammogram  CODIE DIGITAL SCREEN W OR WO CAD BILATERAL              Plan:  Current Outpatient Medications   Medication Sig Dispense Refill    sodium chloride (OCEAN, BABY AYR) 0.65 % nasal spray 1 spray by Nasal route as needed for Congestion      lovastatin (MEVACOR) 40 MG tablet TAKE 1 TABLET BY MOUTH EVERY DAY AT NIGHT 90 tablet 3    zoster recombinant adjuvanted vaccine (SHINGRIX) 50 MCG/0.5ML SUSR injection Inject 0.5 mLs into the muscle See Admin Instructions 1 dose now and repeat in 2-6 months 0.5 mL 1    lisinopril (PRINIVIL;ZESTRIL) 5 MG tablet TAKE 1 TABLET BY MOUTH DAILY 90 tablet 3    levothyroxine (SYNTHROID) 75 MCG tablet TAKE 1 TABLET BY MOUTH EVERY DAY 90 tablet 3    levocetirizine (XYZAL) 5 MG tablet Take 1 tablet by mouth nightly 90 tablet 3    Cholecalciferol (VITAMIN D) 2000 UNITS TABS tablet Take 2 tablets by mouth daily 30 tablet 5     No current facility-administered medications for this visit. Orders Placed This Encounter   Procedures    CODIE DIGITAL SCREEN W OR WO CAD BILATERAL     Standing Status:   Future     Standing Expiration Date:   9/22/2021     Scheduling Instructions:      US if needed. Order Specific Question:   Reason for exam:     Answer:   routine    Comprehensive Metabolic Panel     Standing Status:   Future     Standing Expiration Date:   7/22/2021    Lipid, Fasting     Standing Status:   Future     Standing Expiration Date:   7/22/2021    TSH Without Reflex     Standing Status:   Future     Standing Expiration Date:   7/22/2021       Orders Placed This Encounter   Medications    lovastatin (MEVACOR) 40 MG tablet     Sig: TAKE 1 TABLET BY MOUTH EVERY DAY AT NIGHT     Dispense:  90 tablet     Refill:  3    zoster recombinant adjuvanted vaccine (SHINGRIX) 50 MCG/0.5ML SUSR injection     Sig: Inject 0.5 mLs into the muscle See Admin Instructions 1 dose now and repeat in 2-6 months     Dispense:  0.5 mL     Refill:  1             Return in about 6 months (around 1/22/2021).     Dr. Mike Tamayo      7/22/20  9:12 AM

## 2020-07-23 DIAGNOSIS — I10 ESSENTIAL HYPERTENSION: ICD-10-CM

## 2020-07-23 DIAGNOSIS — E78.2 MIXED HYPERLIPIDEMIA: ICD-10-CM

## 2020-07-23 DIAGNOSIS — E03.4 HYPOTHYROIDISM DUE TO ACQUIRED ATROPHY OF THYROID: ICD-10-CM

## 2020-07-23 LAB
ALBUMIN SERPL-MCNC: 4.7 G/DL (ref 3.5–4.6)
ALP BLD-CCNC: 57 U/L (ref 40–130)
ALT SERPL-CCNC: 12 U/L (ref 0–33)
ANION GAP SERPL CALCULATED.3IONS-SCNC: 15 MEQ/L (ref 9–15)
AST SERPL-CCNC: 20 U/L (ref 0–35)
BILIRUB SERPL-MCNC: 0.4 MG/DL (ref 0.2–0.7)
BUN BLDV-MCNC: 17 MG/DL (ref 8–23)
CALCIUM SERPL-MCNC: 9.1 MG/DL (ref 8.5–9.9)
CHLORIDE BLD-SCNC: 98 MEQ/L (ref 95–107)
CHOLESTEROL, FASTING: 164 MG/DL (ref 0–199)
CO2: 25 MEQ/L (ref 20–31)
CREAT SERPL-MCNC: 0.79 MG/DL (ref 0.5–0.9)
GFR AFRICAN AMERICAN: >60
GFR NON-AFRICAN AMERICAN: >60
GLOBULIN: 2.5 G/DL (ref 2.3–3.5)
GLUCOSE BLD-MCNC: 92 MG/DL (ref 70–99)
HDLC SERPL-MCNC: 72 MG/DL (ref 40–59)
LDL CHOLESTEROL CALCULATED: 76 MG/DL (ref 0–129)
POTASSIUM SERPL-SCNC: 4.6 MEQ/L (ref 3.4–4.9)
SODIUM BLD-SCNC: 138 MEQ/L (ref 135–144)
TOTAL PROTEIN: 7.2 G/DL (ref 6.3–8)
TRIGLYCERIDE, FASTING: 82 MG/DL (ref 0–150)
TSH SERPL DL<=0.05 MIU/L-ACNC: 1.91 UIU/ML (ref 0.44–3.86)

## 2020-08-05 ENCOUNTER — HOSPITAL ENCOUNTER (OUTPATIENT)
Dept: WOMENS IMAGING | Age: 80
Discharge: HOME | End: 2020-08-07
Payer: MEDICARE

## 2020-08-05 PROCEDURE — 77063 BREAST TOMOSYNTHESIS BI: CPT

## 2020-11-03 PROBLEM — M19.90 DJD (DEGENERATIVE JOINT DISEASE): Status: RESOLVED | Noted: 2020-11-03 | Resolved: 2020-11-03

## 2020-12-29 ENCOUNTER — TELEPHONE (OUTPATIENT)
Dept: FAMILY MEDICINE CLINIC | Age: 80
End: 2020-12-29

## 2020-12-29 NOTE — TELEPHONE ENCOUNTER
Patient called stating that she hurt her right foot awhile back while shutting her car door. She said she had spoken to you about it. Her foot is still bothering her. She is requesting a referral to Dr. Narvis Kanner. Thank you.

## 2021-01-06 ENCOUNTER — TELEPHONE (OUTPATIENT)
Dept: FAMILY MEDICINE CLINIC | Age: 81
End: 2021-01-06

## 2021-01-06 NOTE — TELEPHONE ENCOUNTER
Caryl Timmons returned call to office. She states she has Dr Zay Womack contact info and has already talked to someone with his office.   appt scheduled tomorrow 1/7/2021

## 2021-01-08 ENCOUNTER — HOSPITAL ENCOUNTER (OUTPATIENT)
Dept: GENERAL RADIOLOGY | Age: 81
Discharge: HOME | End: 2021-01-10
Payer: MEDICARE

## 2021-01-08 DIAGNOSIS — M79.671 RIGHT FOOT PAIN: ICD-10-CM

## 2021-01-08 PROCEDURE — 73630 X-RAY EXAM OF FOOT: CPT

## 2021-01-29 DIAGNOSIS — E03.4 HYPOTHYROIDISM DUE TO ACQUIRED ATROPHY OF THYROID: ICD-10-CM

## 2021-01-29 RX ORDER — LEVOTHYROXINE SODIUM 0.07 MG/1
TABLET ORAL
Qty: 90 TABLET | Refills: 3 | Status: SHIPPED | OUTPATIENT
Start: 2021-01-29 | End: 2021-12-14 | Stop reason: SDUPTHER

## 2021-02-04 DIAGNOSIS — I10 ESSENTIAL HYPERTENSION: ICD-10-CM

## 2021-02-04 RX ORDER — LISINOPRIL 5 MG/1
TABLET ORAL
Qty: 90 TABLET | Refills: 3 | Status: SHIPPED | OUTPATIENT
Start: 2021-02-04 | End: 2021-06-29

## 2021-02-04 NOTE — TELEPHONE ENCOUNTER
Patient requesting medication refill. Please approve or deny this request.    Rx requested:  Requested Prescriptions     Pending Prescriptions Disp Refills    lisinopril (PRINIVIL;ZESTRIL) 5 MG tablet 90 tablet 3     Sig: TAKE 1 TABLET BY MOUTH DAILY         Last Office Visit:   7/22/2020      Next Visit Date:  No future appointments.

## 2021-04-13 ENCOUNTER — TELEPHONE (OUTPATIENT)
Dept: FAMILY MEDICINE CLINIC | Age: 81
End: 2021-04-13

## 2021-04-13 NOTE — TELEPHONE ENCOUNTER
Patient is calling in stating that she has been having a knee pain and swelling. She scheduled an appointment to see Dr. Keena Tejada on the 21st of this month. Insurance is requesting a referral for the patient to see this provider. I am going to schedule her an appointment to meet with you regarding her symptoms.

## 2021-04-14 NOTE — TELEPHONE ENCOUNTER
Attempted to contact patient regarding which knee is causing her issues.  Lmom for her to call back to provide us that information

## 2021-04-15 DIAGNOSIS — G89.29 CHRONIC PAIN OF RIGHT KNEE: Primary | ICD-10-CM

## 2021-04-15 DIAGNOSIS — E78.2 MIXED HYPERLIPIDEMIA: ICD-10-CM

## 2021-04-15 DIAGNOSIS — I10 ESSENTIAL HYPERTENSION: ICD-10-CM

## 2021-04-15 DIAGNOSIS — E55.9 HYPOVITAMINOSIS D: ICD-10-CM

## 2021-04-15 DIAGNOSIS — E03.4 HYPOTHYROIDISM DUE TO ACQUIRED ATROPHY OF THYROID: ICD-10-CM

## 2021-04-15 DIAGNOSIS — M25.561 CHRONIC PAIN OF RIGHT KNEE: Primary | ICD-10-CM

## 2021-04-15 NOTE — TELEPHONE ENCOUNTER
She is putting pressure on her left knee due to issues with the right, below the knee cap. Please advise. Also, she had her COVID vaccination 1/30/21 and then had the second shot end of Feb, does she need lab work in July?

## 2021-04-26 ENCOUNTER — OFFICE VISIT (OUTPATIENT)
Dept: FAMILY MEDICINE CLINIC | Age: 81
End: 2021-04-26
Payer: MEDICARE

## 2021-04-26 VITALS
DIASTOLIC BLOOD PRESSURE: 70 MMHG | BODY MASS INDEX: 31.83 KG/M2 | RESPIRATION RATE: 16 BRPM | HEIGHT: 62 IN | OXYGEN SATURATION: 96 % | WEIGHT: 173 LBS | TEMPERATURE: 97.9 F | HEART RATE: 76 BPM | SYSTOLIC BLOOD PRESSURE: 132 MMHG

## 2021-04-26 DIAGNOSIS — E55.9 HYPOVITAMINOSIS D: ICD-10-CM

## 2021-04-26 DIAGNOSIS — E03.4 HYPOTHYROIDISM DUE TO ACQUIRED ATROPHY OF THYROID: ICD-10-CM

## 2021-04-26 DIAGNOSIS — I10 ESSENTIAL HYPERTENSION: ICD-10-CM

## 2021-04-26 DIAGNOSIS — E78.2 MIXED HYPERLIPIDEMIA: ICD-10-CM

## 2021-04-26 DIAGNOSIS — M79.89 LEG SWELLING: ICD-10-CM

## 2021-04-26 DIAGNOSIS — M81.0 SENILE OSTEOPOROSIS: ICD-10-CM

## 2021-04-26 DIAGNOSIS — M17.12 ARTHRITIS OF LEFT KNEE: ICD-10-CM

## 2021-04-26 DIAGNOSIS — E78.2 MIXED HYPERLIPIDEMIA: Primary | ICD-10-CM

## 2021-04-26 LAB
ALBUMIN SERPL-MCNC: 4.8 G/DL (ref 3.5–4.6)
ALP BLD-CCNC: 66 U/L (ref 40–130)
ALT SERPL-CCNC: 12 U/L (ref 0–33)
ANION GAP SERPL CALCULATED.3IONS-SCNC: 11 MEQ/L (ref 9–15)
AST SERPL-CCNC: 21 U/L (ref 0–35)
BILIRUB SERPL-MCNC: 0.6 MG/DL (ref 0.2–0.7)
BUN BLDV-MCNC: 20 MG/DL (ref 8–23)
CALCIUM SERPL-MCNC: 9.3 MG/DL (ref 8.5–9.9)
CHLORIDE BLD-SCNC: 98 MEQ/L (ref 95–107)
CHOLESTEROL, FASTING: 184 MG/DL (ref 0–199)
CO2: 28 MEQ/L (ref 20–31)
CREAT SERPL-MCNC: 0.87 MG/DL (ref 0.5–0.9)
GFR AFRICAN AMERICAN: >60
GFR NON-AFRICAN AMERICAN: >60
GLOBULIN: 2.8 G/DL (ref 2.3–3.5)
GLUCOSE BLD-MCNC: 95 MG/DL (ref 70–99)
HDLC SERPL-MCNC: 85 MG/DL (ref 40–59)
LDL CHOLESTEROL CALCULATED: 80 MG/DL (ref 0–129)
POTASSIUM SERPL-SCNC: 4.3 MEQ/L (ref 3.4–4.9)
SODIUM BLD-SCNC: 137 MEQ/L (ref 135–144)
TOTAL PROTEIN: 7.6 G/DL (ref 6.3–8)
TRIGLYCERIDE, FASTING: 94 MG/DL (ref 0–150)
TSH SERPL DL<=0.05 MIU/L-ACNC: 1.83 UIU/ML (ref 0.44–3.86)
VITAMIN D 25-HYDROXY: 23.2 NG/ML (ref 30–100)

## 2021-04-26 PROCEDURE — 99214 OFFICE O/P EST MOD 30 MIN: CPT | Performed by: FAMILY MEDICINE

## 2021-04-26 RX ORDER — LOVASTATIN 40 MG/1
TABLET ORAL
Qty: 90 TABLET | Refills: 3 | Status: SHIPPED | OUTPATIENT
Start: 2021-04-26 | End: 2022-01-20 | Stop reason: SDUPTHER

## 2021-04-26 ASSESSMENT — PATIENT HEALTH QUESTIONNAIRE - PHQ9
SUM OF ALL RESPONSES TO PHQ QUESTIONS 1-9: 0
1. LITTLE INTEREST OR PLEASURE IN DOING THINGS: 0
SUM OF ALL RESPONSES TO PHQ9 QUESTIONS 1 & 2: 0
2. FEELING DOWN, DEPRESSED OR HOPELESS: 0

## 2021-04-26 NOTE — PROGRESS NOTES
the swelling resolves when she lays down. Review of Systems    Constitutional: Negative for fatigue, fever and sweats. HEENT: Negative for eye discharge and vision loss. Negative for ear drainage, hearing loss and nasal drainage. Respiratory: Negative for cough, dyspnea and wheezing. Cardiovascular:  Negative for chest pain, claudication and irregular heartbeat/palpitations. Gastrointestinal: Negative for abdominal pain, nausea, constipation and diarrhea. Genitourinary: Negative for dysuria, patient had a hysterectomy. Metabolic/Endocrine: Negative for cold intolerance, heat intolerance, polydipsia and polyphagia. No unintended weight loss or weight gain. Neuro/Psychiatric: Negative for gait disturbance. Negative for psychiatric symptoms. Dermatologic: Negative for pruritus and rash. Musculoskeletal: positive for bone/joint symptoms. No numbness or tingling. No loss of function. Hematology: Negative for bleeding and easy bruising. Immunology:  Negative for environmental allergies and food allergies. Physical Exam    Patient's medication, allergies, past medical, surgical, social and family histories were reviewed and updated as appropriate. PHYSICAL EXAM     General: Alert oriented pleasant cooperative in no acute distress. Neck: Soft nontender no adenopathy or masses no carotid bruits  Lungs: Clear to auscultation without wheezes rhonchi or rales  Heart: Regular rate and rhythm without murmurs rubs or gallops  Extremities: No rashes or edema grossly neurologically intact she has some nonpitting edema of her ankles bilaterally and prominent varicose veins. Left knee has hypertrophy she still has a valgus deformity in the right leg she walks with a cane            Assessment:   Diagnosis Orders   1. Mixed hyperlipidemia   blood work was ordered few weeks ago she has not got it done so she will do it today   2. Essential hypertension     3.  Hypothyroidism due to acquired atrophy of thyroid 4. Senile osteoporosis  DEXA AXIAL SKELETON W VERTEBRAL FX ASST   5. Arthritis of left knee   soon to have her left knee replaced   6. Leg swelling   and she has venous insufficiency with varicosities and she has been advised to wear knee-high support hose         On this date 04/26/21 I have spent 32 minutes reviewing previous notes, test results and face to face with the patient discussing the diagnosis and importance of compliance with the treatment plan. Plan:  Current Outpatient Medications   Medication Sig Dispense Refill    lovastatin (MEVACOR) 40 MG tablet TAKE 1 TABLET BY MOUTH EVERY DAY AT NIGHT 90 tablet 3    lisinopril (PRINIVIL;ZESTRIL) 5 MG tablet TAKE 1 TABLET BY MOUTH DAILY 90 tablet 3    levothyroxine (SYNTHROID) 75 MCG tablet TAKE 1 TABLET BY MOUTH EVERY DAY 90 tablet 3    sodium chloride (OCEAN, BABY AYR) 0.65 % nasal spray 1 spray by Nasal route as needed for Congestion      levocetirizine (XYZAL) 5 MG tablet Take 1 tablet by mouth nightly 90 tablet 3    Cholecalciferol (VITAMIN D) 2000 UNITS TABS tablet Take 2 tablets by mouth daily 30 tablet 5     No current facility-administered medications for this visit. Orders Placed This Encounter   Procedures    DEXA AXIAL SKELETON W VERTEBRAL FX ASST     Standing Status:   Future     Standing Expiration Date:   4/26/2022       Orders Placed This Encounter   Medications    lovastatin (MEVACOR) 40 MG tablet     Sig: TAKE 1 TABLET BY MOUTH EVERY DAY AT NIGHT     Dispense:  90 tablet     Refill:  3              Return in about 6 months (around 10/26/2021), or with YAEL Mejia. An electronic signature was used to authenticate this note.   Dr. Paniagua Parveen      4/26/21  10:01 AM

## 2021-04-28 ENCOUNTER — VIRTUAL VISIT (OUTPATIENT)
Dept: FAMILY MEDICINE CLINIC | Age: 81
End: 2021-04-28
Payer: MEDICARE

## 2021-04-28 DIAGNOSIS — Z00.00 ROUTINE GENERAL MEDICAL EXAMINATION AT A HEALTH CARE FACILITY: Primary | ICD-10-CM

## 2021-04-28 PROCEDURE — G0438 PPPS, INITIAL VISIT: HCPCS | Performed by: FAMILY MEDICINE

## 2021-04-28 RX ORDER — CHOLECALCIFEROL (VITAMIN D3) 50 MCG
2000 TABLET ORAL DAILY
Qty: 30 TABLET | Refills: 5
Start: 2021-04-28 | End: 2021-06-03

## 2021-04-28 ASSESSMENT — PATIENT HEALTH QUESTIONNAIRE - PHQ9
1. LITTLE INTEREST OR PLEASURE IN DOING THINGS: 0
SUM OF ALL RESPONSES TO PHQ9 QUESTIONS 1 & 2: 0
SUM OF ALL RESPONSES TO PHQ QUESTIONS 1-9: 0

## 2021-04-28 NOTE — PROGRESS NOTES
Medicare Annual Wellness Visit  Are Name: Trent  Date: 2021   MRN: [id number] Sex: Female   Age: 78 y.o. Ethnicity: Non-/Non    : 1940 Race: White      Primo Bowen is here for Medicare AWV (AWV)    Screenings for behavioral, psychosocial and functional/safety risks, and cognitive dysfunction are all negative except as indicated below. These results, as well as other patient data from the [address] form, are documented in Flowsheets linked to this Encounter. Allergies   Allergen Reactions    Seasonal        Prior to Visit Medications    Medication Sig Taking?  Authorizing Provider   lovastatin (MEVACOR) 40 MG tablet TAKE 1 TABLET BY MOUTH EVERY DAY AT NIGHT Yes Brian Tran MD   lisinopril (PRINIVIL;ZESTRIL) 5 MG tablet TAKE 1 TABLET BY MOUTH DAILY Yes Brian Tran MD   levothyroxine (SYNTHROID) 75 MCG tablet TAKE 1 TABLET BY MOUTH EVERY DAY Yes Brian Tran MD   sodium chloride (OCEAN, BABY AYR) 0.65 % nasal spray 1 spray by Nasal route as needed for Congestion Yes Historical Provider, MD   Cholecalciferol (VITAMIN D) 2000 UNITS TABS tablet Take 2 tablets by mouth daily Yes Brian Tran MD       Past Medical History:   Diagnosis Date    Allergic rhinitis     Carotid artery occlusion     Carpal tunnel syndrome, bilateral     Colitis, ischemic ([address]     DJD (degenerative joint disease)     H/O: psoriasis     History of bad fall     MULTIPLE CONTUSIONS    History of diverticulitis of colon     History of kidney stones     History of mammography, screening     CAT 2    History of osteopenia     History of shingles     Hyperlipidemia     Hypertension     Hypothyroidism     Renal failure     MILD       Past Surgical History:   Procedure Laterality Date    BREAST SURGERY  80'S    R BREAST LUMPECTOMY    COLONOSCOPY  2010    SIGMOID DIVERTICULOSIS    HYSTERECTOMY  -PARTIAL    BENIGN TUMOR    JOINT REPLACEMENT Right 08/01/2012    right knee    KNEE ARTHROSCOPY  1992-RIGHT    TONSILLECTOMY      UPPER GASTROINTESTINAL ENDOSCOPY  01/01/2010    GEN NORMAL,GASTRIC MUCOSAL ERYTHEMIA       Family History   Problem Relation Age of Onset    Cancer Mother         BREAST    Diabetes Mother     Heart Attack Father     Cancer Sister         BREAST       CareTeam (Including outside providers/suppliers regularly involved in providing care):   Patient Care Team:  Zena Villegas MD as PCP - General (Family Medicine)  Zena Villegas MD as PCP - [facility] Empaneled Provider    Wt Readings from Last 3 Encounters:   04/26/21 173 lb (78.5 kg)   01/22/20 174 lb (78.9 kg)   11/11/19 174 lb (78.9 kg)      No flowsheet data found. There is no height or weight on file to calculate BMI. Based upon direct observation of the patient, evaluation of cognition reveals recent and remote memory intact. Not able to be done as this was a phone visit. Patient was alert, oriented, appropriate, not SOB with talking and not in distress. Patient's complete Health Risk Assessment and screening values have been reviewed and are found in Flowsheets. The following problems were reviewed today and where indicated follow up appointments were made and/or referrals ordered. Positive Risk Factor Screenings with Interventions:          General Health and ACP:  General  In general, how would you say your health is?: Good  In the past 7 days, have you experienced any of the following?  New or Increased Pain, New or Increased Fatigue, Loneliness, Social Isolation, Stress or Anger?: None of These  Do you get the social and emotional support that you need?: Yes  Do you have a Living Will?: (!) No  Advance Directives     Power of [facility] Will ACP-Advance Directive ACP-Power of     Not on File Not on File Not on File Not on File      General Health Risk Interventions:  · No Living Will: Advance Care Planning addressed with patient today    Health Habits/Nutrition:  Health Habits/Nutrition  Do you exercise for at least 20 minutes 2-3 times per week?: (!) No  Have you lost any weight without trying in the past 3 months?: No  Do you eat only one meal per day?: No  Have you seen the dentist within the past year?: N/A - wear dentures     Health Habits/Nutrition Interventions:  · Inadequate physical activity:  She will have a knee replacement shortly and she does not want to do too much exercise and get injured before then     Safety:  Safety  Do you have working smoke detectors?: Yes  Have all throw rugs been removed or fastened?: Yes  Do you have non-slip mats or surfaces in all bathtubs/showers?: (!) No  Do all of your stairways have a railing or banister?: Yes  Are your doorways, halls and stairs free of clutter?: Yes  Do you always fasten your seatbelt when you are in a car?: Yes  Safety Interventions:  · Patient declines any further evaluation/treatment for this issue     Personalized Preventive Plan   Current Health Maintenance Status  Immunization History   Administered Date(s) Administered    COVID-19, Moderna, PF, 100mcg/0.5mL 01/30/2021, 02/27/2021    Influenza 10/16/2012, 10/24/2013    Influenza Virus Vaccine 10/10/2010, 11/20/2014    Influenza, High Dose (Fluzone 65 yrs and older) 11/19/2015, 11/01/2016, 01/04/2018, 10/03/2018    Influenza, Quadv, adjuvanted, 65 yrs +, IM, PF (Fluad) 10/20/2020    Influenza, Triv, inactivated, subunit, adjuvanted, IM (Fluad 65 yrs and older) 10/03/2018, 08/22/2019    Pneumococcal Conjugate 13-valent ([id number]) 11/01/2016    Pneumococcal Polysaccharide ([id number]) 12/04/2006, 10/03/2018        Health Maintenance   Topic Date Due    Shingles Vaccine (1 of 2) Never done   [employer] Visit (AWV)  Never done    DTaP/Tdap/Td vaccine (1 - Tdap) 07/22/2021 (Originally 9/19/1959)    Lipid screen  04/26/2022    TSH testing  04/26/2022    Potassium monitoring  04/26/2022    Creatinine patients choices for care and treatment in case of a health event that adversely affects decision-making abilities. Also discussed the patients long-term treatment options. Reviewed with the patient the [address] [place] [address] of [address] [place] [address] Declaration forms  Reviewed the process of designating a competent adult as an Agent (or -in-fact) that could take make health care decisions for the patient if incompetent. Patient was asked to complete the declaration forms, either acknowledge the forms by a public notary or an eligible witness and provide a signed copy to the practice office.   Time spent (minutes): 10

## 2021-04-29 ENCOUNTER — HOSPITAL ENCOUNTER (OUTPATIENT)
Dept: WOMENS IMAGING | Age: 81
Discharge: HOME | End: 2021-05-01
Payer: MEDICARE

## 2021-04-29 DIAGNOSIS — M81.0 SENILE OSTEOPOROSIS: ICD-10-CM

## 2021-04-29 PROCEDURE — 77080 DXA BONE DENSITY AXIAL: CPT

## 2021-05-04 RX ORDER — ALENDRONATE SODIUM 70 MG/1
70 TABLET ORAL
Qty: 12 TABLET | Refills: 1 | Status: SHIPPED | OUTPATIENT
Start: 2021-05-04 | End: 2021-10-20 | Stop reason: SDUPTHER

## 2021-05-05 ENCOUNTER — TELEPHONE (OUTPATIENT)
Dept: FAMILY MEDICINE CLINIC | Age: 81
End: 2021-05-05

## 2021-05-05 NOTE — TELEPHONE ENCOUNTER
Talked to Rashmi Valdivia and she is concerned will ALL the side effects of the Fosamax. I did let her know that it should stated on there that it is possible and she is still leery of taking the medication. She wanted to know if there is a vitamin that she could take. Please advise.

## 2021-05-05 NOTE — TELEPHONE ENCOUNTER
There is no vitamin that would improve her bone density. The Fosamax has been available for over 20 years and is well known and is very safe. The most common side effect is heartburn which if she has any symptoms of this she is to let me know.   I recommend she try it as it is a very well known and safe medication for treating bone density

## 2021-05-13 ENCOUNTER — OFFICE VISIT (OUTPATIENT)
Dept: FAMILY MEDICINE CLINIC | Age: 81
End: 2021-05-13
Payer: MEDICARE

## 2021-05-13 VITALS
DIASTOLIC BLOOD PRESSURE: 70 MMHG | TEMPERATURE: 97.7 F | BODY MASS INDEX: 31.83 KG/M2 | RESPIRATION RATE: 16 BRPM | HEART RATE: 86 BPM | SYSTOLIC BLOOD PRESSURE: 136 MMHG | OXYGEN SATURATION: 97 % | HEIGHT: 62 IN | WEIGHT: 173 LBS

## 2021-05-13 DIAGNOSIS — I10 ESSENTIAL HYPERTENSION: ICD-10-CM

## 2021-05-13 DIAGNOSIS — E03.4 HYPOTHYROIDISM DUE TO ACQUIRED ATROPHY OF THYROID: ICD-10-CM

## 2021-05-13 DIAGNOSIS — M84.374G STRESS FRACTURE OF METATARSAL BONE OF RIGHT FOOT WITH DELAYED HEALING, SUBSEQUENT ENCOUNTER: ICD-10-CM

## 2021-05-13 DIAGNOSIS — E55.9 HYPOVITAMINOSIS D: ICD-10-CM

## 2021-05-13 DIAGNOSIS — E78.2 MIXED HYPERLIPIDEMIA: ICD-10-CM

## 2021-05-13 DIAGNOSIS — M17.12 ARTHRITIS OF LEFT KNEE: Primary | ICD-10-CM

## 2021-05-13 PROCEDURE — 99214 OFFICE O/P EST MOD 30 MIN: CPT | Performed by: FAMILY MEDICINE

## 2021-05-13 NOTE — PROGRESS NOTES
sweats. HEENT: Negative for eye discharge and vision loss. Negative for ear drainage, hearing loss and nasal drainage. Respiratory: Negative for cough, dyspnea and wheezing. Cardiovascular:  Negative for chest pain, claudication and irregular heartbeat/palpitations. Gastrointestinal: Negative for abdominal pain, nausea, constipation and diarrhea. Genitourinary: Negative for dysuria, patient had a hysterectomy. Metabolic/Endocrine: Negative for cold intolerance, heat intolerance, polydipsia and polyphagia. No unintended weight loss or weight gain. Neuro/Psychiatric: Negative for gait disturbance. Negative for psychiatric symptoms. Dermatologic: Negative for pruritus and rash. Musculoskeletal: positive for bone/joint symptoms. No numbness or tingling. No loss of function. Hematology: Negative for bleeding and easy bruising. Immunology:  Negative for environmental allergies and food allergies. Physical Exam    Patient's medication, allergies, past medical, surgical, social and family histories were reviewed and updated as appropriate. PHYSICAL EXAM     General: Alert oriented pleasant cooperative no acute distress nonicteric not pale  Neck: Soft nontender no adenopathy or masses no carotid bruits  Lungs: Clear to auscultation without wheezes rhonchi or rales  Heart: Regular rate and rhythm without murmurs rubs or gallops  Abdomen: Soft nontender no rebound guarding or masses no bruits  Extremities: Negative straight leg raises no rashes or edema I did examine her right foot she has a scar above her right lateral malleolus but along the fifth metatarsal she has a lot of tenderness no swelling or step-off no crepitus              Assessment:   Diagnosis Orders   1.  Arthritis of left knee   await presurgical testing and EKG for we approved patient I have ordered a work-up of her foot in case it might affect her knee surgery is the right foot pain may make it difficult for her to do therapy she needs for her left knee replacement   2. Hypothyroidism due to acquired atrophy of thyroid   stable   3. Essential hypertension   stable   4. Mixed hyperlipidemia   stable   5. Hypovitaminosis D   increase vitamin D to a total of 2000 international units a day   6. Stress fracture of metatarsal bone of right foot with delayed healing, subsequent encounter  MRI FOOT RIGHT WO CONTRAST         On this date 05/13/21 I have spent 33 minutes reviewing previous notes, test results and face to face with the patient discussing the diagnosis and importance of compliance with the treatment plan. Plan:  Current Outpatient Medications   Medication Sig Dispense Refill    Ca Phosphate-Cholecalciferol (CALCIUM 500 + D3) 250-500 MG-UNIT CHEW Take 1 tablet by mouth 2 times daily      alendronate (FOSAMAX) 70 MG tablet Take 1 tablet by mouth every 7 days 12 tablet 1    vitamin D (CHOLECALCIFEROL) 50 MCG (2000 UT) TABS tablet Take 1 tablet by mouth daily 30 tablet 5    lovastatin (MEVACOR) 40 MG tablet TAKE 1 TABLET BY MOUTH EVERY DAY AT NIGHT 90 tablet 3    lisinopril (PRINIVIL;ZESTRIL) 5 MG tablet TAKE 1 TABLET BY MOUTH DAILY 90 tablet 3    levothyroxine (SYNTHROID) 75 MCG tablet TAKE 1 TABLET BY MOUTH EVERY DAY 90 tablet 3    sodium chloride (OCEAN, BABY AYR) 0.65 % nasal spray 1 spray by Nasal route as needed for Congestion       No current facility-administered medications for this visit. Orders Placed This Encounter   Procedures    MRI FOOT RIGHT WO CONTRAST     Standing Status:   Future     Standing Expiration Date:   5/13/2022       No orders of the defined types were placed in this encounter. Return if symptoms worsen or fail to improve. An electronic signature was used to authenticate this note.   Dr. Bob Akers      5/13/21  1:56 PM

## 2021-05-20 ENCOUNTER — HOSPITAL ENCOUNTER (OUTPATIENT)
Dept: MRI IMAGING | Age: 81
Discharge: HOME | End: 2021-05-22
Payer: MEDICARE

## 2021-05-20 DIAGNOSIS — M84.374G STRESS FRACTURE OF METATARSAL BONE OF RIGHT FOOT WITH DELAYED HEALING, SUBSEQUENT ENCOUNTER: ICD-10-CM

## 2021-05-20 PROCEDURE — 73718 MRI LOWER EXTREMITY W/O DYE: CPT

## 2021-06-03 RX ORDER — OXYCODONE HYDROCHLORIDE 5 MG/1
5 TABLET ORAL EVERY 4 HOURS PRN
COMMUNITY
End: 2021-06-11 | Stop reason: SDUPTHER

## 2021-06-03 RX ORDER — ASPIRIN 81 MG/1
81 TABLET ORAL 2 TIMES DAILY
COMMUNITY

## 2021-06-03 RX ORDER — DOCUSATE SODIUM 100 MG/1
100 CAPSULE, LIQUID FILLED ORAL 2 TIMES DAILY
COMMUNITY
End: 2021-09-29 | Stop reason: ALTCHOICE

## 2021-06-03 RX ORDER — METHOCARBAMOL 500 MG/1
500 TABLET, FILM COATED ORAL EVERY 6 HOURS PRN
COMMUNITY
End: 2021-09-29

## 2021-06-03 RX ORDER — OXYCODONE HYDROCHLORIDE 5 MG/1
2.5 TABLET ORAL EVERY 4 HOURS PRN
COMMUNITY
End: 2021-06-11 | Stop reason: SDUPTHER

## 2021-06-11 ENCOUNTER — OFFICE VISIT (OUTPATIENT)
Dept: GERIATRIC MEDICINE | Age: 81
End: 2021-06-11
Payer: MEDICARE

## 2021-06-11 DIAGNOSIS — I10 ESSENTIAL HYPERTENSION: ICD-10-CM

## 2021-06-11 DIAGNOSIS — M15.9 OSTEOARTHRITIS OF MULTIPLE JOINTS, UNSPECIFIED OSTEOARTHRITIS TYPE: ICD-10-CM

## 2021-06-11 DIAGNOSIS — M79.671 RIGHT FOOT PAIN: Primary | ICD-10-CM

## 2021-06-11 DIAGNOSIS — M25.569 ACUTE KNEE PAIN, UNSPECIFIED LATERALITY: ICD-10-CM

## 2021-06-11 DIAGNOSIS — E03.4 HYPOTHYROIDISM DUE TO ACQUIRED ATROPHY OF THYROID: ICD-10-CM

## 2021-06-11 DIAGNOSIS — M25.569 ACUTE KNEE PAIN, UNSPECIFIED LATERALITY: Primary | ICD-10-CM

## 2021-06-11 PROCEDURE — 99304 1ST NF CARE SF/LOW MDM 25: CPT | Performed by: INTERNAL MEDICINE

## 2021-06-11 RX ORDER — OXYCODONE HYDROCHLORIDE 5 MG/1
5 TABLET ORAL EVERY 4 HOURS PRN
Qty: 14 TABLET | Refills: 0 | Status: SHIPPED | OUTPATIENT
Start: 2021-06-11 | End: 2021-06-11 | Stop reason: SDUPTHER

## 2021-06-11 RX ORDER — OXYCODONE HYDROCHLORIDE 5 MG/1
5 TABLET ORAL EVERY 4 HOURS PRN
Qty: 30 TABLET | Refills: 0 | Status: SHIPPED | OUTPATIENT
Start: 2021-06-11 | End: 2021-06-25

## 2021-06-11 RX ORDER — OXYCODONE HYDROCHLORIDE 5 MG/1
2.5 TABLET ORAL EVERY 4 HOURS PRN
Qty: 14 TABLET | Refills: 0 | Status: SHIPPED | OUTPATIENT
Start: 2021-06-11 | End: 2021-06-11

## 2021-06-15 LAB
BASOPHILS ABSOLUTE: ABNORMAL
BASOPHILS RELATIVE PERCENT: ABNORMAL
BUN BLDV-MCNC: 21 MG/DL
CALCIUM SERPL-MCNC: 9.2 MG/DL
CHLORIDE BLD-SCNC: 101 MMOL/L
CO2: 25 MMOL/L
CREAT SERPL-MCNC: 0.9 MG/DL
EOSINOPHILS ABSOLUTE: ABNORMAL
EOSINOPHILS RELATIVE PERCENT: ABNORMAL
GFR CALCULATED: NORMAL
GLUCOSE BLD-MCNC: 105 MG/DL
HCT VFR BLD CALC: 33.2 % (ref 36–46)
HEMOGLOBIN: 11.4 G/DL (ref 12–16)
LYMPHOCYTES ABSOLUTE: ABNORMAL
LYMPHOCYTES RELATIVE PERCENT: ABNORMAL
MCH RBC QN AUTO: 31.5 PG
MCHC RBC AUTO-ENTMCNC: 34.5 G/DL
MCV RBC AUTO: 91.2 FL
MONOCYTES ABSOLUTE: ABNORMAL
MONOCYTES RELATIVE PERCENT: ABNORMAL
NEUTROPHILS ABSOLUTE: ABNORMAL
NEUTROPHILS RELATIVE PERCENT: ABNORMAL
PLATELET # BLD: 310 K/ΜL
PMV BLD AUTO: 7.7 FL
POTASSIUM SERPL-SCNC: 4.3 MMOL/L
RBC # BLD: 3.64 10^6/ΜL
SODIUM BLD-SCNC: 138 MMOL/L
WBC # BLD: 7.6 10^3/ML

## 2021-06-17 ENCOUNTER — OFFICE VISIT (OUTPATIENT)
Dept: GERIATRIC MEDICINE | Age: 81
End: 2021-06-17
Payer: MEDICARE

## 2021-06-17 DIAGNOSIS — E03.4 HYPOTHYROIDISM DUE TO ACQUIRED ATROPHY OF THYROID: ICD-10-CM

## 2021-06-17 DIAGNOSIS — E78.2 MIXED HYPERLIPIDEMIA: ICD-10-CM

## 2021-06-17 DIAGNOSIS — M25.562 ACUTE PAIN OF LEFT KNEE: Primary | ICD-10-CM

## 2021-06-17 DIAGNOSIS — I10 ESSENTIAL HYPERTENSION: ICD-10-CM

## 2021-06-17 DIAGNOSIS — M19.90 GENERALIZED ARTHRITIS: ICD-10-CM

## 2021-06-17 PROCEDURE — 99309 SBSQ NF CARE MODERATE MDM 30: CPT | Performed by: NURSE PRACTITIONER

## 2021-06-21 ENCOUNTER — OFFICE VISIT (OUTPATIENT)
Dept: GERIATRIC MEDICINE | Age: 81
End: 2021-06-21
Payer: MEDICARE

## 2021-06-21 VITALS
WEIGHT: 176 LBS | SYSTOLIC BLOOD PRESSURE: 122 MMHG | TEMPERATURE: 96.3 F | HEART RATE: 74 BPM | DIASTOLIC BLOOD PRESSURE: 54 MMHG | RESPIRATION RATE: 16 BRPM | BODY MASS INDEX: 32.19 KG/M2 | OXYGEN SATURATION: 96 %

## 2021-06-21 DIAGNOSIS — E03.4 HYPOTHYROIDISM DUE TO ACQUIRED ATROPHY OF THYROID: ICD-10-CM

## 2021-06-21 DIAGNOSIS — G89.29 CHRONIC PAIN OF LEFT KNEE: Primary | ICD-10-CM

## 2021-06-21 DIAGNOSIS — Z96.652 STATUS POST TOTAL LEFT KNEE REPLACEMENT: ICD-10-CM

## 2021-06-21 DIAGNOSIS — I10 ESSENTIAL HYPERTENSION: ICD-10-CM

## 2021-06-21 DIAGNOSIS — M25.562 CHRONIC PAIN OF LEFT KNEE: Primary | ICD-10-CM

## 2021-06-21 PROCEDURE — 99316 NF DSCHRG MGMT 30 MIN+: CPT | Performed by: NURSE PRACTITIONER

## 2021-06-22 NOTE — PROGRESS NOTES
1976-PARTIAL    BENIGN TUMOR    JOINT REPLACEMENT Right 08/01/2012    right knee    KNEE ARTHROSCOPY  1992-RIGHT    TONSILLECTOMY      UPPER GASTROINTESTINAL ENDOSCOPY  01/01/2010    GEN NORMAL,GASTRIC MUCOSAL ERYTHEMIA     Family History   Problem Relation Age of Onset    Cancer Mother         BREAST    Diabetes Mother     Heart Attack Father     Cancer Sister         BREAST     Social History     Socioeconomic History    Marital status:      Spouse name: Not on file    Number of children: Not on file    Years of education: Not on file    Highest education level: Not on file   Occupational History    Not on file   Tobacco Use    Smoking status: Never Smoker    Smokeless tobacco: Never Used   Substance and Sexual Activity    Alcohol use: No    Drug use: No    Sexual activity: Never   Other Topics Concern    Not on file   Social History Narrative    Not on file     Social Determinants of Health     Financial Resource Strain:     Difficulty of Paying Living Expenses:    Food Insecurity:     Worried About Running Out of Food in the Last Year:     [address] in the Last Year:    Transportation Needs:     Lack of Transportation (Medical):  Lack of Transportation (Non-Medical):    Physical Activity:     Days of Exercise per Week:     Minutes of Exercise per Session:    Stress:     Feeling of Stress :    Social Connections:     Frequency of Communication with Friends and Family:     Frequency of Social Gatherings with Friends and Family:     Attends Scientologist Services:     Active Member of Clubs or Organizations:     Attends Club or Organization Meetings:     Marital Status:    Intimate Partner Violence:     Fear of Current or Ex-Partner:     Emotionally Abused:     Physically Abused:     Sexually Abused:         Allergies: Seasonal  NF MEDICATIONS REVIEWED  Tylenol as needed for pain   alendronate 70 mg every Saturday for osteoporosis   aspirin 81 mg daily calcium carbonate with vitamin D 600/400 unit daily   Colace 1 twice daily   levothyroxine 75 mcg daily   lisinopril 5 mg daily   lovastatin 40 mg at bedtime   methocarbamol 500 as needed discontinued prior to leaving     oxycodone 5 mg 1/2-1 every 4 hours as needed #5 only sent home with patient    ROS:   Constitutional: There are no reports of behavioral issues, change in appetite, fever, or weakness. Respiratory: No SOB   Cardiovascular: No CP  GI: No N/V/D. : no reports of dysuria  Extremities:   reports of pain issues to left knee 4-5 out of 10 goes up to about a 7 after working out with therapy Tylenol is very effective, no gross edema  Rehabilitation: Participated in rehabilitation and is able to perform ADLs, toilet self, obtain nutrition and remove self from home in an emergency. Requests home health therapy at discharge. Physical exam:   BP (!) 122/54   Pulse 74   Temp 96.3 °F (35.7 °C)   Resp 16   Wt 176 lb (79.8 kg)   SpO2 96%   BMI 32.19 kg/m²     Constitutional: Awake and alert sitting up in room. Cardiovascular: Regular rate  -c/r  Respiratory: LCTA  GI: abdomen NT ND  : no suprapubic tenderness  Extremities: no edema, DP pulses 2+/4  Left knee incision well healed  Mobility: is able to transfer in out of bed to chair    ASSESSMENT:     Diagnosis Orders   1. Chronic pain of left knee     2. Hypothyroidism due to acquired atrophy of thyroid     3. Essential hypertension     4.  Status post total left knee replacement         PLAN:   Chronic DJD knee pain resolved after patient had total knee replacement she had acute pain to the knee surgical in nature she is healed now went through therapy very well she is ambulating over 50 feet going home she is using very little oxycodone mostly using Tylenol  Continue PT OT at discharge  Levothyroxine without issue she will follow up with PCP for biannual TSH   essential hypertension blood pressures are within controlled we will continue same medications no issues she is a status post total knee replacement she is well healed she will follow-up with Ortho in a few days and then follow-up with her PCP in 1 week to 2 weeks      Return PCP one week. Pt/POA agrees to POC  Total time taken for discharging this patient: 35 minutes. Time was taken to review chart, discuss plans with discharge planner, nursing, reconciling medications, discussing plan answering questions with patient. Pertinent POC, labs, have been reviewed  Discharge patient from facility when arrangements are made. Out pt PT OT  Equipment needed none. Same medications and Send medications with patient. Nurse to give pt Narcotic  Oxycodone 5 mg po tab  #5. F/U with PCP in 3-5 days. Nicole Pfeiffer, APRN-CNP      Nicole Pfeiffer DNP, MSN, RN, GNP-BC, NP-C  Adult/[place] Nurse Practitioner  [address]  Department of Geriatrics  8:30am-4:30pm   [phone]  After hours Answering service [phone]      Please note this report is partially produced by using speech recognition hardware. It may contain errors related to the system, including grammar, punctuation and spelling as well as words and phrases that may seem inaccurate.   For any questions or concerns feel free to contact me for clarification

## 2021-06-29 ENCOUNTER — OFFICE VISIT (OUTPATIENT)
Dept: FAMILY MEDICINE CLINIC | Age: 81
End: 2021-06-29
Payer: MEDICARE

## 2021-06-29 VITALS
OXYGEN SATURATION: 97 % | HEIGHT: 62 IN | TEMPERATURE: 97.7 F | SYSTOLIC BLOOD PRESSURE: 150 MMHG | DIASTOLIC BLOOD PRESSURE: 70 MMHG | WEIGHT: 169 LBS | HEART RATE: 102 BPM | BODY MASS INDEX: 31.1 KG/M2 | RESPIRATION RATE: 16 BRPM

## 2021-06-29 DIAGNOSIS — I10 ESSENTIAL HYPERTENSION: ICD-10-CM

## 2021-06-29 DIAGNOSIS — Z96.652 STATUS POST LEFT KNEE REPLACEMENT: Primary | ICD-10-CM

## 2021-06-29 PROCEDURE — 99213 OFFICE O/P EST LOW 20 MIN: CPT | Performed by: FAMILY MEDICINE

## 2021-06-29 RX ORDER — LISINOPRIL 10 MG/1
TABLET ORAL
Qty: 90 TABLET | Refills: 2 | Status: SHIPPED | OUTPATIENT
Start: 2021-06-29 | End: 2021-12-14 | Stop reason: SDUPTHER

## 2021-06-29 RX ORDER — OXYCODONE HYDROCHLORIDE AND ACETAMINOPHEN 5; 325 MG/1; MG/1
TABLET ORAL
COMMUNITY
Start: 2021-05-26 | End: 2021-09-29

## 2021-06-29 SDOH — ECONOMIC STABILITY: FOOD INSECURITY: WITHIN THE PAST 12 MONTHS, THE FOOD YOU BOUGHT JUST DIDN'T LAST AND YOU DIDN'T HAVE MONEY TO GET MORE.: NEVER TRUE

## 2021-06-29 SDOH — ECONOMIC STABILITY: TRANSPORTATION INSECURITY
IN THE PAST 12 MONTHS, HAS THE LACK OF TRANSPORTATION KEPT YOU FROM MEDICAL APPOINTMENTS OR FROM GETTING MEDICATIONS?: NO

## 2021-06-29 SDOH — ECONOMIC STABILITY: TRANSPORTATION INSECURITY
IN THE PAST 12 MONTHS, HAS LACK OF TRANSPORTATION KEPT YOU FROM MEETINGS, WORK, OR FROM GETTING THINGS NEEDED FOR DAILY LIVING?: NO

## 2021-06-29 SDOH — ECONOMIC STABILITY: FOOD INSECURITY: WITHIN THE PAST 12 MONTHS, YOU WORRIED THAT YOUR FOOD WOULD RUN OUT BEFORE YOU GOT MONEY TO BUY MORE.: NEVER TRUE

## 2021-06-29 ASSESSMENT — SOCIAL DETERMINANTS OF HEALTH (SDOH): HOW HARD IS IT FOR YOU TO PAY FOR THE VERY BASICS LIKE FOOD, HOUSING, MEDICAL CARE, AND HEATING?: NOT VERY HARD

## 2021-06-29 NOTE — PROGRESS NOTES
Patient: Eitan Mariano (: 1940) is a 78 y.o. female,Established patient, here for evaluation of the following chief complaint(s):  Chief Complaint   Patient presents with    Follow-Up from [address] [place] [address]     She had left knee replacement on 2021 at [facility] She was transferred to [employer] 2021 to 2021. Date: 21    Allergies   Allergen Reactions    Seasonal        Vitals:    21 1242 21 1248   BP: (!) 150/70 (!) 150/70   Site: Right Upper Arm Right Upper Arm   Position: Sitting Sitting   Cuff Size: Small Adult Small Adult   Pulse: 102    Resp: 16    Temp: 97.7 °F (36.5 °C)    TempSrc: Oral    SpO2: 97%    Weight: 169 lb (76.7 kg)    Height: 5' 2\" (1.575 m)       Body mass index is 30.91 kg/m². PHQ Scores 2021   PHQ2 Score 0 0 0 0 0 0 0   PHQ9 Score 0 0 0 0 0 0 0     Interpretation of Total Score Depression Severity: 1-4 = Minimal depression, 5-9 = Mild depression, 10-14 = Moderate depression, 15-19 = Moderately severe depression, 20-27 = Severe depression    HPI    She is following up on her blood pressure and after she had her left knee replaced. She was at nursing home for a week to rehabilitate and did well. She was able to ambulate into the office with a walker today. She still has a lot of pain but she denies redness swelling or cough chest pain nausea or malaise otherwise. Review of Systems    Constitutional: Negative for fatigue, fever and sweats. HEENT: Negative for eye discharge and vision loss. Negative for ear drainage, hearing loss and nasal drainage. Respiratory: Negative for cough, dyspnea and wheezing. Cardiovascular:  Negative for chest pain, claudication and irregular heartbeat/palpitations. Gastrointestinal: Negative for abdominal pain, nausea, constipation and diarrhea. Genitourinary: Negative for dysuria, patient had a hysterectomy.   Metabolic/Endocrine: Negative for cold intolerance, heat intolerance, polydipsia and polyphagia. No unintended weight loss or weight gain. Neuro/Psychiatric: Negative for gait disturbance. Negative for psychiatric symptoms. Dermatologic: Negative for pruritus and rash. Musculoskeletal: positive for bone/joint symptoms. No numbness or tingling. No loss of function. Hematology: Negative for bleeding and easy bruising. Immunology:  Negative for environmental allergies and food allergies. Physical Exam    Patient's medication, allergies, past medical, surgical, social and family histories were reviewed and updated as appropriate. PHYSICAL EXAM     General: Alert oriented pleasant cooperative in no acute distress  Lungs: Clear to auscultation without wheezes rhonchi or rales  Heart: Regular rate and rhythm without murmurs rubs or gallops  Extremities: Left knee she has a nicely healed scar that is not dehisced or erythematous she has slight increased warmth of that knee but no erythema no calf tenderness she has 1+ pitting edema of the legs bilaterally              Assessment:   Diagnosis Orders   1. Status post left knee replacement   I recommend she go back to wearing her thigh-high support hose until she gets her some of that swelling to help me heal especially when she is at home and out of bed   2. Essential hypertension  lisinopril (PRINIVIL;ZESTRIL) 10 MG tablet increase this to 10 mg a days her blood pressure is up which is probably exacerbated by pain keep follow-up for lab work with next provider as scheduled         On this date 06/29/21 I have spent 23 minutes reviewing previous notes, test results and face to face with the patient discussing the diagnosis and importance of compliance with the treatment plan. Plan:  Current Outpatient Medications   Medication Sig Dispense Refill    oxyCODONE-acetaminophen (PERCOCET) 5-325 MG per tablet Take by mouth.       lisinopril (PRINIVIL;ZESTRIL) 10 MG tablet TAKE 1 TABLET BY MOUTH DAILY 90 tablet 2    aspirin 81 MG EC tablet Take 81 mg by mouth 2 times daily      calcium carbonate-vitamin D (CALTRATE) 600-400 MG-UNIT TABS per tab Take 1 tablet by mouth daily      docusate sodium (COLACE) 100 MG capsule Take 100 mg by mouth 2 times daily      methocarbamol (ROBAXIN) 500 MG tablet Take 500 mg by mouth every 6 hours as needed Thigh pain operative site      alendronate (FOSAMAX) 70 MG tablet Take 1 tablet by mouth every 7 days 12 tablet 1    lovastatin (MEVACOR) 40 MG tablet TAKE 1 TABLET BY MOUTH EVERY DAY AT NIGHT 90 tablet 3    levothyroxine (SYNTHROID) 75 MCG tablet TAKE 1 TABLET BY MOUTH EVERY DAY 90 tablet 3     No current facility-administered medications for this visit. No orders of the defined types were placed in this encounter. Orders Placed This Encounter   Medications    lisinopril (PRINIVIL;ZESTRIL) 10 MG tablet     Sig: TAKE 1 TABLET BY MOUTH DAILY     Dispense:  90 tablet     Refill:  2              Return in 3 months (on 9/29/2021), or Dain Olszewski, for [facility] 10/26/2021 with YAEL Emerson. An electronic signature was used to authenticate this note.   Dr. Agus Novak MD      6/29/21  1:08 PM

## 2021-06-30 ENCOUNTER — HOSPITAL ENCOUNTER (OUTPATIENT)
Dept: PHYSICAL THERAPY | Age: 81
Setting detail: THERAPIES SERIES
Discharge: HOME | End: 2021-06-30
Payer: MEDICARE

## 2021-06-30 PROCEDURE — 97162 PT EVAL MOD COMPLEX 30 MIN: CPT

## 2021-06-30 PROCEDURE — 97110 THERAPEUTIC EXERCISES: CPT

## 2021-06-30 ASSESSMENT — PAIN DESCRIPTION - DESCRIPTORS: DESCRIPTORS: SORE

## 2021-06-30 ASSESSMENT — PAIN DESCRIPTION - LOCATION: LOCATION: KNEE

## 2021-06-30 ASSESSMENT — PAIN SCALES - GENERAL: PAINLEVEL_OUTOF10: 6

## 2021-06-30 ASSESSMENT — PAIN DESCRIPTION - ORIENTATION: ORIENTATION: LEFT

## 2021-06-30 ASSESSMENT — PAIN - FUNCTIONAL ASSESSMENT: PAIN_FUNCTIONAL_ASSESSMENT: PREVENTS OR INTERFERES WITH MANY ACTIVE NOT PASSIVE ACTIVITIES

## 2021-06-30 ASSESSMENT — PAIN DESCRIPTION - FREQUENCY: FREQUENCY: CONTINUOUS

## 2021-06-30 NOTE — PROGRESS NOTES
Patient Name: Elizabeth Maguire  YOB: 1940  Medical Record Number: [id number]        History of Present Illness:  Patient was been here for recent evaluation for possibility of acute cellulitis? Osteomyelitis in her right foot patient had undergone an MRI which showed evidence of possible hyperactivity patient has been course antibiotic therapy patient received local supportive care with symptomatic treated under course physical therapy patient is here for short stay patient will undergo course physical therapy outpatient therapy focus on functional status and pain continue course of current medication regimen patient is clinically stable time globally weak at this time. Review of Systems   Constitutional: Positive for fatigue. Negative for fever. HENT: Negative for congestion. Respiratory: Negative for cough and shortness of breath. Cardiovascular: Negative for chest pain and leg swelling. Gastrointestinal: Positive for constipation. Musculoskeletal: Positive for arthralgias and joint swelling. Neurological: Positive for weakness. Psychiatric/Behavioral: Negative for agitation. All other systems reviewed and are negative.       Review of Systems: All 14 review of systems negative other than as stated above    Social History     Tobacco Use    Smoking status: Never Smoker    Smokeless tobacco: Never Used   Substance Use Topics    Alcohol use: No    Drug use: No         Past Medical History:   Diagnosis Date    Allergic rhinitis     Carotid artery occlusion     Carpal tunnel syndrome, bilateral     Colitis, ischemic ([address]     DJD (degenerative joint disease)     H/O: psoriasis     History of bad fall 2009    MULTIPLE CONTUSIONS    History of diverticulitis of colon     History of kidney stones     History of mammography, screening 2010    CAT 2    History of osteopenia     History of shingles     Hyperlipidemia     Hypertension     Hypothyroidism     Renal Normal breath sounds. Abdominal:      Palpations: Abdomen is soft. Tenderness: There is no abdominal tenderness. Musculoskeletal:         General: Swelling and tenderness present. Cervical back: No rigidity. Right lower leg: Edema present. Skin:     General: Skin is dry. Findings: Bruising present. Neurological:      Mental Status: She is alert. Mental status is at baseline. Psychiatric:         Mood and Affect: Mood normal.         not currently breastfeeding. .   Lab Results   Component Value Date    WBC 7.6 06/15/2021    HGB 11.4 (A) 06/15/2021    HCT 33.2 (A) 06/15/2021    MCV 91.2 06/15/2021     06/15/2021     Lab Results   Component Value Date     06/15/2021    K 4.3 06/15/2021     06/15/2021    CO2 25 06/15/2021    BUN 21 06/15/2021    CREATININE 0.9 06/15/2021    GLUCOSE 105 06/15/2021    GLUCOSE 170 06/02/2021    CALCIUM 9.2 06/15/2021                ASSESSMENT:  Patient Active Problem List   Diagnosis    Essential hypertension    Mixed hyperlipidemia    Hypothyroidism    Carpal tunnel syndrome, bilateral    Anxiety    Hypovitaminosis D    Dyshidrotic eczema    Generalized arthritis         PLAN:   Diagnosis Orders   1. Right foot pain     2. Osteoarthritis of multiple joints, unspecified osteoarthritis type     3. Hypothyroidism due to acquired atrophy of thyroid     4. Essential hypertension         Continue course physical therapy therapy local wound care for right lower extremity no recent headaches fever chills continue anti-inflammatory agents monitor renal function closely repeat TSH.

## 2021-06-30 NOTE — PLAN OF CARE
[facility]     Ph: [phone]  Fax: [phone]    [x] Certification  [] Recertification [x]  Plan of Care  [] Progress Note [] Discharge      To:  Dr. Kaylee Espinoza       From:  Josselin Abreu, PT  Patient: Que Rubio      : 1940  Diagnosis: primary OA of Lt knee, s/p Lt TKR     Date: 2021  Treatment Diagnosis: impaired mobility, imbalance, decreased knee ROM, LE weakness    Plan of Care/Certification Expiration Date: 21  Progress Report Period from:  2021  to 2021    Total # of Visits to Date: 1   No Show: 0    Canceled Appointment: 0     OBJECTIVE:   Short Term Goals - Time Frame for Short term goals: 2 wks    Goals Current/Discharge status  Met   Short term goal 1: Independent with HEP  Need for HEP [] yes  [] no   Short term goal 2: Report 25% reduction in pain with activities and movement  C/o 6/10 pain Lt knee affecting all mobility  [] yes  [] no   Short term goal 3: Improve Lt knee ROM  to improve transfers  PROM RLE (degrees)  RLE PROM: Exceptions  R Knee Flexion 0-145: 88 seated after repetitions  R Knee Extension 0: lacking 15 from neutral supine  AROM RLE (degrees)  RLE AROM: Exceptions  R Knee Flexion 0-145: 86 seated after repetitions  R Knee Extension 0: lacking 16 from neutral in supine  PROM LLE (degrees)  LLE PROM: Exceptions    [] yes  [] no     Long Term Goals - Time Frame for Long term goals : 6 wks  Goals Current/ Discharge status Met   Long term goal 1: Improve Lt knee ROM 5-120 to improve gait quality and stairs See above  [] yes  [] no   Long term goal 2: Improve Lt LE strength >/= 4+/5 to improve gait and balance Strength RLE  Strength RLE: WFL  Strength LLE  Strength LLE: Exception  L Hip Flexion: 4-/5  L Knee Flexion: 4/5  L Knee Extension: 4-/5  L Ankle Dorsiflexion: 4+/5 [] yes  [] no   Long term goal 3: Ambulate 500 ft independently with safest AD with improved step length, step height, and knee flexion to advance Ambulates independently with ww with decreased knee extension during stance, decreased Lt step length and height with decreased knee flexion to advance, decreased speed [] yes  [] no   Long term goal 4: up/ down 4-6\" steps x3 with 1 HR independently 4-6\" with 1 HR and double grasp on one rail independently/ supervision, 1 HR with str cane with supervision for sequencing  [] yes  [] no   Long term goal 5: Archibald >/= 45/56 to demonstrate improved balance with decreased risk for falls Archibald Balance Score: 31   [] yes  [] no        Body structures, Functions, Activity limitations: Decreased functional mobility , Decreased ROM, Decreased strength, Decreased endurance, Decreased balance, Decreased coordination, Increased pain  Assessment: Pt presents with impaired mobility following Lt TKR. Pt reports progressing well while at inpatient therapy, now feels regressing with motion. Noted edema and mild warmth. No sig redness. Pt with limited range of motion flexion and extension with painful endfeel. Pt would benefit from further PT to address deficits and return to previous function with decreased pain.   Prognosis: Good      PT Education: PT Role;Goals;Plan of Care;Home Exercise Program;Disease Specific Education    PLAN: [x] Evaluate and Treat  Frequency/Duration:  Plan  Times per week: 2  Plan weeks: 4-5  Current Treatment Recommendations: Strengthening, Balance Training, Functional Mobility Training, ROM, Gait Training, Stair training, Neuromuscular Re-education, Home Exercise Program, Safety Education & Training, Patient/Caregiver Education & Training, Equipment Evaluation, Education, & procurement, Modalities, Manual Therapy - Soft Tissue Mobilization  Plan Comment: Transfer care of pt to [facility]     Precautions:  TKR                      Patient Status:[x] Continue/ Initiate plan of Care    [] Discharge PT. Recommend pt continue with HEP. [] Additional visits requested, Please re-certify for additional visits:        Signature: Electronically signed by Dong Bunn PT on 6/30/21 at 4:02 PM EDT    If you have any questions or concerns, please don't hesitate to call. Thank you for your referral.    I have reviewed this plan of care and certify a need for medically necessary rehabilitation services.     Physician Signature:__________________________________________________________  Date:  Please sign and return

## 2021-06-30 NOTE — PROGRESS NOTES
[facility]   Outpatient Physical Therapy   Evaluation      [x] [address]  [] [facility]     Date: 2021  Patient: Eduardo Foot  : 1940  ACCT #: [de-identified]  Referring physician: Referring Practitioner: Dr. Renetta Iqbal    Referring Practitioner: Dr. Renetta Iqbal    Referral Date : 21    Diagnosis: primary OA of Lt knee, s/p Lt TKR    Treatment Diagnosis: impaired mobility, imbalance, decreased knee ROM, LE weakness  Onset Date: 21  PT Insurance Information: [facility] Medicare  Total # of Visits Approved: 1 (eval only)   Total # of Visits to Date: 1  No Show: 0  Canceled Appointment: 0  Progress Note Due Date:   Plan of Care/Certification Expiration Date: 21    History   has a past medical history of Allergic rhinitis, Carotid artery occlusion, Carpal tunnel syndrome, bilateral, Colitis, ischemic ([address] DJD (degenerative joint disease), H/O: psoriasis, History of bad fall, History of diverticulitis of colon, History of kidney stones, History of mammography, screening, History of osteopenia, History of shingles, Hyperlipidemia, Hypertension, Hypothyroidism, and Renal failure.   has a past surgical history that includes Knee arthroscopy (-RIGHT); Hysterectomy (-PARTIAL); Breast surgery (); Colonoscopy (2010); Upper gastrointestinal endoscopy (2010); Tonsillectomy; joint replacement (Right, 2012); and joint replacement (Left, 2021). Allergies   Allergen Reactions    Seasonal      Current Outpatient Medications on File Prior to Encounter   Medication Sig Dispense Refill    oxyCODONE-acetaminophen (PERCOCET) 5-325 MG per tablet Take by mouth.       lisinopril (PRINIVIL;ZESTRIL) 10 MG tablet TAKE 1 TABLET BY MOUTH DAILY 90 tablet 2    aspirin 81 MG EC tablet Take 81 mg by mouth 2 times daily      calcium carbonate-vitamin D (CALTRATE) 600-400 MG-UNIT TABS per tab Take 1 tablet by mouth daily      docusate sodium (COLACE) 100 MG capsule Take 100 mg by mouth 2 times daily      methocarbamol (ROBAXIN) 500 MG tablet Take 500 mg by mouth every 6 hours as needed Thigh pain operative site      alendronate (FOSAMAX) 70 MG tablet Take 1 tablet by mouth every 7 days 12 tablet 1    lovastatin (MEVACOR) 40 MG tablet TAKE 1 TABLET BY MOUTH EVERY DAY AT NIGHT 90 tablet 3    levothyroxine (SYNTHROID) 75 MCG tablet TAKE 1 TABLET BY MOUTH EVERY DAY 90 tablet 3     No current facility-administered medications on file prior to encounter. Subjective  Subjective: Pt underwent Lt TKR 6/1/21. Pt went to [facility] until 6/22/21. Pt states was doing well with rehab. States increased difficulty since discharge home. Pt states decreasing ROM. Pt states she is doing well with most ADLs. Pt states some difficulty with steps. Has noted increased edma. Pt states she is icing and elevating at home intermittently.   Additional Pertinent Hx: DJD, HTN, psoriasis,  Pain Screening  Patient Currently in Pain: Yes  Pain Assessment  Pain Assessment: 0-10  Pain Level: 6 (Range: 6-8/10)  Pain Location: Knee  Pain Orientation: Left  Pain Descriptors: Sore (stinging)  Pain Frequency: Continuous  Functional Pain Assessment: Prevents or interferes with many active not passive activities    Social/Functional History  Lives With: Alone  Type of Home: House  Home Layout: One level, Laundry in basement, Able to Live on Main level with bedroom/bathroom  Home Equipment: Rolling walker, Cane  ADL Assistance: Independent  Homemaking Assistance: Independent  Ambulation Assistance: Independent (without AD prior to surgery)  Transfer Assistance: Independent  Active : Yes (has not driven since surgery)  Mode of Transportation: Car  Occupation: Retired  Type of occupation: [employer]  Leisure & Hobbies: jeanne, wordsearch puzzles, looking at pictures  IADL Comments: Pt reports functioning 75%  Additional Comments: Pt currently staying with friend. 1 story. 3 steps with 1 HR to enter. Objective  Vision  Vision: Impaired  Hearing  Hearing: Within functional limits  Observation/Palpation  Palpation: mild/ moderate warmth  Edema: moderate edema Lt knee  Scar: healing incision    Strength RLE  Strength RLE: WFL  Strength LLE  Strength LLE: Exception  L Hip Flexion: 4-/5  L Knee Flexion: 4/5  L Knee Extension: 4-/5  L Ankle Dorsiflexion: 4+/5  PROM RLE (degrees)  RLE PROM: Exceptions  R Knee Flexion 0-145: 88 seated after repetitions  R Knee Extension 0: lacking 15 from neutral supine  AROM RLE (degrees)  RLE AROM: Exceptions  R Knee Flexion 0-145: 86 seated after repetitions  R Knee Extension 0: lacking 16 from neutral in supine  PROM LLE (degrees)  LLE PROM: Exceptions                                  Sensation  Overall Sensation Status: Impaired (reports diminished sensation distal to incision lower leg)   Bed mobility  Rolling to Left: Independent  Rolling to Right: Independent  Supine to Sit: Independent  Sit to Supine: Independent  Comment: increased time and effort to complete     Transfers  Sit to Stand: Independent  Stand to sit:  Independent  Ambulation 1  Surface: carpet  Device: Rolling Walker  Assistance: Independent  Quality of Gait: decreased knee extension during stance, decreased Lt step length and height with decreased knee flexion to advance, decreased speed  Distance: 150 ft  Ambulation 2  Surface - 2: carpet  Device 2: Single point cane  Assistance 2: Supervision  Quality of Gait 2: good sequencing, continued decreased knee extension during stance, decreased step length and height, decreased knee flex to advance  Distance: 25 ft  Stairs  # Steps : 4  Stairs Height: 6\"  Rails: Left ascending (double grasp on one rail)  Assistance: Supervision, Independent  Comment: 1 HR and str cane with supervision with VCs for initial sequencing           Archibald Balance Score: 31       Exercises: Exercises  Exercise 1: QS with towel roll under heel 5sec/ 10  Exercise 2: knee flexion slides with washcloth and board 20 sec/ 5  Exercise 3: ** Nu step  Exercise 4: ** prone knee hang if able  Exercise 5: ** seated hams str  Exercise 6: ** sink exs  Exercise 7: ** 3 way SLR mat  Exercise 8: ** step ups  Exercise 9: ** standing terminal knee extension with YTB  Exercise 20: HEP: QS, knee flexion seated    Manual:  Manual therapy  PROM: ** Lt knee flex and ext  Soft Tissue Mobalization: ** hams, quads    Modalities:  Modalities  Cryotherapy (Minutes\Location): ** game ready      ** indicates treatment to be performed at future treatment     POST-PAIN    Pain Rating (0-10 pain scale): 6  Location and Pain Description same as pre-pain unless otherwise indicated. Action: [] NA  [] Call Physician  [x] Perform HEP  [x] Meds as prescribed     Assessment   Conditions Requiring Skilled Therapeutic Intervention  Body structures, Functions, Activity limitations: Decreased functional mobility , Decreased ROM, Decreased strength, Decreased endurance, Decreased balance, Decreased coordination, Increased pain  Assessment: Pt presents with impaired mobility following Lt TKR. Pt reports progressing well while at inpatient therapy, now feels regressing with motion. Noted edema and mild warmth. No sig redness. Pt with limited range of motion flexion and extension with painful endfeel. Pt would benefit from further PT to address deficits and return to previous function with decreased pain.   Treatment Diagnosis: impaired mobility, imbalance, decreased knee ROM, LE weakness  Prognosis: Good  Decision Making: Medium Complexity  History: personal factors: lives alone but staying with friend, pain; contributing PMH: DJD, HTN, psoriasis  Exam: musculoskeletal, pain and sensory systems involved impacting strength, balance, stairs, transfers; Archibald56, LEFS:   Clinical Presentation: complicated  Barriers to Learning: no  REQUIRES PT FOLLOW UP: Yes    Patient Education   PT Education: PT Role, Goals, Plan of Care, Home Exercise Program, Disease Specific Education    Pt verbalized/demonstrated good understanding:     [x] Yes         [] No, pt required further clarification. Goals   Patient goal: Patient goals : be able to move knee, walk better    Short term goals  Time Frame for Short term goals: 2 wks  Short term goal 1: Independent with HEP  Short term goal 2: Report 25% reduction in pain with activities and movement  Short term goal 3: Improve Lt knee ROM  to improve transfers    Long term goals  Time Frame for Long term goals : 6 wks  Long term goal 1: Improve Lt knee ROM 5-120 to improve gait quality and stairs  Long term goal 2: Improve Lt LE strength >/= 4+/5 to improve gait and balance  Long term goal 3: Ambulate 500 ft independently with safest AD with improved step length, step height, and knee flexion to advance  Long term goal 4: up/ down 4-6\" steps x3 with 1 HR independently  Long term goal 5: Archibald >/= 45/56 to demonstrate improved balance with decreased risk for falls    Plan:  Plan  Times per week: 2  Plan weeks: 4-5  Current Treatment Recommendations: Strengthening, Balance Training, Functional Mobility Training, ROM, Gait Training, Stair training, Neuromuscular Re-education, Home Exercise Program, Safety Education & Training, Patient/Caregiver Education & Training, Equipment Evaluation, Education, & procurement, Modalities, Manual Therapy - Soft Tissue Mobilization  Plan Comment: Transfer care of pt to [facility], PT       Evaluation and patient rights have been reviewed and patient agrees with plan of care. Yes  [x]  No  []   Explain:     Signature: Electronically signed by Kaylee Gomez PT on 6/30/21 at 4:01 PM EDT    PT Individual Minutes  Time In: 1006  Time Out: 0977  Minutes: 47  Timed Code Treatment Minutes: 5 Minutes (ther ex)  Procedure Minutes: 42 mary;     Mackey Fall Risk Assessment  Risk Factor Scale  Score   History of Falls [] Yes  [x] No 25  0 0   Secondary Diagnosis [] Yes  [x] No 15  0 0   Ambulatory Aid [] Furniture  [x] Crutches/cane/walker  [] None/bedrest/wheelchair/nurse 30  15  0 15   IV/Heparin Lock [] Yes  [x] No 20  0 0   Gait/Transferring [] Impaired  [x] Weak  [] Normal/bedrest/immobile 20  10  0 10   Mental Status [] Forgets limitations  [x] Oriented to own ability 15  0 0      Total: 25     Based on the Assessment score: check the appropriate box.   [x]  No intervention needed   Low =   Score of 0-24  []  Use standard prevention interventions Moderate =  Score of 24-44   [] Discuss fall prevention strategies   [] Indicate moderate falls risk on eval  []  Use high risk prevention interventions High = Score of 45 and higher   [] Discuss fall prevention strategies   [] Provide supervision during treatment time

## 2021-07-07 ENCOUNTER — APPOINTMENT (OUTPATIENT)
Dept: GENERAL RADIOLOGY | Age: 81
End: 2021-07-07
Payer: MEDICARE

## 2021-07-07 ENCOUNTER — HOSPITAL ENCOUNTER (OUTPATIENT)
Dept: PHYSICAL THERAPY | Age: 81
Setting detail: THERAPIES SERIES
Discharge: HOME | End: 2021-07-07
Payer: MEDICARE

## 2021-07-07 ENCOUNTER — HOSPITAL ENCOUNTER (EMERGENCY)
Age: 81
Discharge: HOME | End: 2021-07-07
Attending: EMERGENCY MEDICINE
Payer: MEDICARE

## 2021-07-07 VITALS
HEART RATE: 80 BPM | SYSTOLIC BLOOD PRESSURE: 134 MMHG | BODY MASS INDEX: 31.28 KG/M2 | OXYGEN SATURATION: 98 % | DIASTOLIC BLOOD PRESSURE: 68 MMHG | WEIGHT: 170 LBS | HEIGHT: 62 IN | RESPIRATION RATE: 17 BRPM | TEMPERATURE: 98.6 F

## 2021-07-07 DIAGNOSIS — M25.469 KNEE SWELLING: Primary | ICD-10-CM

## 2021-07-07 LAB
ALBUMIN SERPL-MCNC: 4.7 G/DL (ref 3.5–4.6)
ALP BLD-CCNC: 82 U/L (ref 40–130)
ALT SERPL-CCNC: 12 U/L (ref 0–33)
ANION GAP SERPL CALCULATED.3IONS-SCNC: 12 MEQ/L (ref 9–15)
AST SERPL-CCNC: 18 U/L (ref 0–35)
BASOPHILS ABSOLUTE: 0.1 K/UL (ref 0–0.2)
BASOPHILS RELATIVE PERCENT: 1.1 %
BILIRUB SERPL-MCNC: 0.4 MG/DL (ref 0.2–0.7)
BUN BLDV-MCNC: 14 MG/DL (ref 8–23)
C-REACTIVE PROTEIN: 2.4 MG/L (ref 0–5)
CALCIUM SERPL-MCNC: 9.8 MG/DL (ref 8.5–9.9)
CHLORIDE BLD-SCNC: 102 MEQ/L (ref 95–107)
CO2: 25 MEQ/L (ref 20–31)
CREAT SERPL-MCNC: 0.8 MG/DL (ref 0.5–0.9)
EOSINOPHILS ABSOLUTE: 0.1 K/UL (ref 0–0.7)
EOSINOPHILS RELATIVE PERCENT: 1.4 %
GFR AFRICAN AMERICAN: >60
GFR NON-AFRICAN AMERICAN: >60
GLOBULIN: 2.7 G/DL (ref 2.3–3.5)
GLUCOSE BLD-MCNC: 109 MG/DL (ref 70–99)
HCT VFR BLD CALC: 34.6 % (ref 37–47)
HEMOGLOBIN: 11.8 G/DL (ref 12–16)
LYMPHOCYTES ABSOLUTE: 1.3 K/UL (ref 1–4.8)
LYMPHOCYTES RELATIVE PERCENT: 18.1 %
MCH RBC QN AUTO: 31.4 PG (ref 27–31.3)
MCHC RBC AUTO-ENTMCNC: 34.1 % (ref 33–37)
MCV RBC AUTO: 91.9 FL (ref 82–100)
MONOCYTES ABSOLUTE: 0.8 K/UL (ref 0.2–0.8)
MONOCYTES RELATIVE PERCENT: 11 %
NEUTROPHILS ABSOLUTE: 5 K/UL (ref 1.4–6.5)
NEUTROPHILS RELATIVE PERCENT: 68.4 %
PDW BLD-RTO: 13.9 % (ref 11.5–14.5)
PLATELET # BLD: 259 K/UL (ref 130–400)
POTASSIUM SERPL-SCNC: 3.8 MEQ/L (ref 3.4–4.9)
RBC # BLD: 3.76 M/UL (ref 4.2–5.4)
SEDIMENTATION RATE, ERYTHROCYTE: 23 MM (ref 0–30)
SODIUM BLD-SCNC: 139 MEQ/L (ref 135–144)
TOTAL PROTEIN: 7.4 G/DL (ref 6.3–8)
WBC # BLD: 7.3 K/UL (ref 4.8–10.8)

## 2021-07-07 PROCEDURE — 80053 COMPREHEN METABOLIC PANEL: CPT

## 2021-07-07 PROCEDURE — 86140 C-REACTIVE PROTEIN: CPT

## 2021-07-07 PROCEDURE — 85652 RBC SED RATE AUTOMATED: CPT

## 2021-07-07 PROCEDURE — 99283 EMERGENCY DEPT VISIT LOW MDM: CPT

## 2021-07-07 PROCEDURE — 36415 COLL VENOUS BLD VENIPUNCTURE: CPT

## 2021-07-07 PROCEDURE — 87040 BLOOD CULTURE FOR BACTERIA: CPT

## 2021-07-07 PROCEDURE — 85025 COMPLETE CBC W/AUTO DIFF WBC: CPT

## 2021-07-07 PROCEDURE — 73562 X-RAY EXAM OF KNEE 3: CPT

## 2021-07-07 ASSESSMENT — ENCOUNTER SYMPTOMS
CHEST TIGHTNESS: 0
COLOR CHANGE: 1
SORE THROAT: 0
SHORTNESS OF BREATH: 0
VOMITING: 0
NAUSEA: 0
EYE PAIN: 0
ABDOMINAL PAIN: 0

## 2021-07-07 NOTE — LETTER
[facility]  [address] [place] [address]  Phone: [phone]  Fax: [phone]    July 8, 2021    5 [facility]    Dear Isadora Zhu,    This letter is regarding your Emergency Department (ED) visit at [facility]  on 7/7/21. Dr. Demetrio Barksdale wanted to make sure that you understand your discharge instructions and that you were able to fill any prescriptions that may have been ordered for you. Please contact the office at the above phone number if the ED advised you follow up with Dr. Demetrio Barksdale, or if you have any further questions or needs. Also did you know -   *Visiting the ED for a non-emergency could result in higher co-pays than you would normally be subject to paying? *You can call your doctor even after hours so they can direct you to the most appropriate care. [facility]) practices can often offer you an appointment on the same day that you call. Many [address]  appointments; check our website for availability in your community, [email or web address]    Evisits are now available for patients for $36 through [employer] for certain conditions:  * Sinus, cold and or cough       * Diarrhea            * Headache  * Heartburn                                * Poison Megha          * Back pain     * Urinary problems                         Sincerely,     Demetrio Barksdale MD and your [facility]

## 2021-07-07 NOTE — PROGRESS NOTES
Therapy                            Cancellation/No-show Note      Date:  2021  Patient Name:  Eduardo Foot  :  1940   MRN:  [id number]  Referring Practitioner: Dr. Renetta Iqbal  Diagnosis: primary OA of Lt knee, s/p Lt TKR    Visit Information:  PT Visit Information  Onset Date: 21  PT Insurance Information: [facility] Medicare  Total # of Visits Approved: 1 (eval only)  Total # of Visits to Date: 2  Plan of Care/Certification Expiration Date: 21  No Show: 0  Progress Note Due Date: 21  Progress Note Counter:  (9 visits approved until 10-3) (cancelled by therapist on )    For today's appointment patient:  [x]  Cancelled by therapist   []  Rescheduled appointment  []  No-show   []  Called pt to remind of next appointment     Reason given by patient:  []  Patient ill  []  Conflicting appointment  []  No transportation    []  Conflict with work  []  No reason given  []  Other:      [] Pt has future appointments scheduled, no follow up needed  [] Pt requests to be on hold. Reason:   If > 2 weeks please discuss with therapist.  [] Therapist to call pt for follow up     Comments:  Patient arrived to appt. C/o new redness surrounding left knee/shin. Also reports increased edema and warmth. Pt's daughter reports she called the doctor this morning. Notes she was instructed to go to ER to R/o a blood clot. Left knee assessed by supervising PT. Cancelled today's appt. Advised patient to follow Doctors instructions.      Signature: Electronically signed by Catrachita Mccollum PTA on 21 at 2:15 PM EDT  Electronically signed by Anish Zaldivar PT on 2021 at 3:15 PM

## 2021-07-07 NOTE — ED TRIAGE NOTES
Pt states she was sent to er to have left knee checked. Pt had a total knee replacement on 6/1/21 the knee is red and hot to touch. Pt is aox4 pwd.

## 2021-07-07 NOTE — ED PROVIDER NOTES
[address] [facility] ED  EMERGENCY DEPARTMENT ENCOUNTER      Pt Name: Edin Agarwal  MRN: [id number]  [place] 1940  Date of evaluation: 7/7/2021  Provider: Fatimah Curtis, [address] [place] [address]       Chief Complaint   Patient presents with    Knee Pain     left          HISTORY OF PRESENT ILLNESS   (Location/Symptom, Timing/Onset, Context/Setting, Quality, Duration, Modifying Factors, Severity)  Note limiting factors. Edin Agarwal is a 78 y.o. female who presents to the emergency department . Patient comes in with left knee redness and warmth. Patient had knee replacement on June 1 by Dr. Jessi Banda. She was at therapy today and they were concerned that it looked a little red and warm. She is able to bend it but is a little bit limited since surgery. She does not have any severe pain to the knee. No fevers or chills. She feels well generally. HPI    Nursing Notes were reviewed. REVIEW OF SYSTEMS    (2-9 systems for level 4, 10 or more for level 5)     Review of Systems   Constitutional: Negative for activity change, appetite change, fatigue and fever. HENT: Negative for congestion and sore throat. Eyes: Negative for pain and visual disturbance. Respiratory: Negative for chest tightness and shortness of breath. Cardiovascular: Negative for chest pain. Gastrointestinal: Negative for abdominal pain, nausea and vomiting. Endocrine: Negative for polydipsia. Genitourinary: Negative for flank pain and urgency. Musculoskeletal: Positive for arthralgias and joint swelling. Negative for gait problem and neck stiffness. Skin: Positive for color change. Negative for rash. Neurological: Negative for weakness, light-headedness and headaches. Psychiatric/Behavioral: Negative for confusion and sleep disturbance. Except as noted above the remainder of the review of systems was reviewed and negative.        PAST MEDICAL HISTORY     Past Medical History: Diagnosis Date    Allergic rhinitis     Carotid artery occlusion     Carpal tunnel syndrome, bilateral     Colitis, ischemic (HCC)     DJD (degenerative joint disease)     H/O: psoriasis     History of bad fall 2009    MULTIPLE CONTUSIONS    History of diverticulitis of colon     History of kidney stones     History of mammography, screening 2010    CAT 2    History of osteopenia     History of shingles     Hyperlipidemia     Hypertension     Hypothyroidism     Renal failure     MILD         SURGICAL HISTORY       Past Surgical History:   Procedure Laterality Date    BREAST SURGERY  80'S    R BREAST LUMPECTOMY    COLONOSCOPY  01/01/2010    SIGMOID DIVERTICULOSIS    HYSTERECTOMY  1976-PARTIAL    BENIGN TUMOR    JOINT REPLACEMENT Right 08/01/2012    right knee    JOINT REPLACEMENT Left 06/01/2021    left knee    KNEE ARTHROSCOPY  1992-RIGHT    TONSILLECTOMY      UPPER GASTROINTESTINAL ENDOSCOPY  01/01/2010    GEN NORMAL,GASTRIC MUCOSAL ERYTHEMIA         CURRENT MEDICATIONS       Previous Medications    ALENDRONATE (FOSAMAX) 70 MG TABLET    Take 1 tablet by mouth every 7 days    ASPIRIN 81 MG EC TABLET    Take 81 mg by mouth 2 times daily    CALCIUM CARBONATE-VITAMIN D (CALTRATE) 600-400 MG-UNIT TABS PER TAB    Take 1 tablet by mouth daily    DOCUSATE SODIUM (COLACE) 100 MG CAPSULE    Take 100 mg by mouth 2 times daily    LEVOTHYROXINE (SYNTHROID) 75 MCG TABLET    TAKE 1 TABLET BY MOUTH EVERY DAY    LISINOPRIL (PRINIVIL;ZESTRIL) 10 MG TABLET    TAKE 1 TABLET BY MOUTH DAILY    LOVASTATIN (MEVACOR) 40 MG TABLET    TAKE 1 TABLET BY MOUTH EVERY DAY AT NIGHT    METHOCARBAMOL (ROBAXIN) 500 MG TABLET    Take 500 mg by mouth every 6 hours as needed Thigh pain operative site    OXYCODONE-ACETAMINOPHEN (PERCOCET) 5-325 MG PER TABLET    Take by mouth.        ALLERGIES     Seasonal    FAMILY HISTORY       Family History   Problem Relation Age of Onset    Cancer Mother         BREAST    Diabetes Mother  Heart Attack Father     Cancer Sister         BREAST          SOCIAL HISTORY       Social History     Socioeconomic History    Marital status:      Spouse name: None    Number of children: None    Years of education: None    Highest education level: None   Occupational History    None   Tobacco Use    Smoking status: Never Smoker    Smokeless tobacco: Never Used   Substance and Sexual Activity    Alcohol use: No    Drug use: No    Sexual activity: Never   Other Topics Concern    None   Social History Narrative    None     Social Determinants of Health     Financial Resource Strain: Low Risk     Difficulty of Paying Living Expenses: Not very hard   Food Insecurity: No Food Insecurity    Worried About Running Out of Food in the Last Year: Never true    Tania of Food in the Last Year: Never true   Transportation Needs: No Transportation Needs    Lack of Transportation (Medical): No    Lack of Transportation (Non-Medical): No   Physical Activity:     Days of Exercise per Week:     Minutes of Exercise per Session:    Stress:     Feeling of Stress :    Social Connections:     Frequency of Communication with Friends and Family:     Frequency of Social Gatherings with Friends and Family:     Attends Yarsani Services:     Active Member of Clubs or Organizations:     Attends Club or Organization Meetings:     Marital Status:    Intimate Partner Violence:     Fear of Current or Ex-Partner:     Emotionally Abused:     Physically Abused:     Sexually Abused:        SCREENINGS                        PHYSICAL EXAM    (up to 7 for level 4, 8 or more for level 5)     ED Triage Vitals [07/07/21 1442]   BP Temp Temp Source Pulse Resp SpO2 Height Weight   136/76 98.6 °F (37 °C) Temporal 93 18 97 % 5' 2\" (1.575 m) 170 lb (77.1 kg)       Physical Exam  Vitals and nursing note reviewed. Constitutional:       General: She is not in acute distress. Appearance: She is well-developed.  She is not diaphoretic. HENT:      Head: Normocephalic and atraumatic. Right Ear: External ear normal.      Left Ear: External ear normal.      Mouth/Throat:      Pharynx: No oropharyngeal exudate. Eyes:      Conjunctiva/sclera: Conjunctivae normal.      Pupils: Pupils are equal, round, and reactive to light. Neck:      Thyroid: No thyromegaly. Vascular: No JVD. Trachea: No tracheal deviation. Cardiovascular:      Rate and Rhythm: Normal rate. Heart sounds: Normal heart sounds. No murmur heard. Pulmonary:      Effort: Pulmonary effort is normal. No respiratory distress. Breath sounds: Normal breath sounds. No wheezing. Abdominal:      General: Bowel sounds are normal.      Palpations: Abdomen is soft. Tenderness: There is no abdominal tenderness. There is no guarding. Musculoskeletal:         General: Swelling present. Cervical back: Normal range of motion and neck supple. Comments: Left knee incision healing well. Mild erythema. Mild increase in temperature. Some limitation of flexion secondary to postop swelling. No Homans or cords. No streaking. No drainage from the wound   Skin:     General: Skin is warm and dry. Findings: No rash. Neurological:      Mental Status: She is alert and oriented to person, place, and time. Cranial Nerves: No cranial nerve deficit.    Psychiatric:         Behavior: Behavior normal.         DIAGNOSTIC RESULTS     EKG: All EKG's are interpreted by the Emergency Department Physician who either signs or Co-signs this chart in the absence of a cardiologist.        RADIOLOGY:   Non-plain film images such as CT, Ultrasound and MRI are read by the radiologist. Plain radiographic images are visualized and preliminarily interpreted by the emergency physician with the below findings:    Left knee x-ray hardware intact no gas in tissue    Interpretation per the Radiologist below, if available at the time of this note:    XR KNEE

## 2021-07-09 ENCOUNTER — HOSPITAL ENCOUNTER (OUTPATIENT)
Dept: PHYSICAL THERAPY | Age: 81
Setting detail: THERAPIES SERIES
Discharge: HOME | End: 2021-07-09
Payer: MEDICARE

## 2021-07-09 PROCEDURE — 97116 GAIT TRAINING THERAPY: CPT

## 2021-07-09 PROCEDURE — 97140 MANUAL THERAPY 1/> REGIONS: CPT

## 2021-07-09 PROCEDURE — 97110 THERAPEUTIC EXERCISES: CPT

## 2021-07-09 ASSESSMENT — PAIN DESCRIPTION - LOCATION: LOCATION: KNEE

## 2021-07-09 ASSESSMENT — PAIN DESCRIPTION - DESCRIPTORS: DESCRIPTORS: TIGHTNESS

## 2021-07-09 ASSESSMENT — PAIN DESCRIPTION - ORIENTATION: ORIENTATION: LEFT

## 2021-07-09 NOTE — PROGRESS NOTES
[address] [address]  Outpatient Physical Therapy    Treatment Note        Date: 2021  Patient: Angela Rowland  : 1940  ACCT #: [de-identified]  Referring Practitioner: Dr. Chely Delgadillo  Diagnosis: primary OA of Lt knee, s/p Lt TKR  Treatment Diagnosis: impaired mobility, imbalance, decreased knee ROM, LE weakness    Visit Information:  PT Visit Information  Onset Date: 21  PT Insurance Information: BCBS Medicare  Total # of Visits Approved: 1 (eval only)  Total # of Visits to Date: 2  Plan of Care/Certification Expiration Date: 21  No Show: 0  Canceled Appointment: 1  Progress Note Counter:  (9 visits approved  [email or web address])    Subjective: no pain currently just stiff     HEP Compliance:  [x] Good [] Fair [] Poor [] Reports not doing due to:    Vital Signs  Patient Currently in Pain: Denies   Pain Screening  Patient Currently in Pain: Denies  Pain Assessment  Pain Location: Knee  Pain Orientation: Left  Pain Descriptors: Tightness    OBJECTIVE:   Exercises  Exercise 3: Nu-Step, L-2, 5 min  Exercise 9: TKR 5 sec x 10, YTB  Exercise 10: gait, fwd laps in //bars x 3, using mirror, and SLS high knee march w/ 3 sec hold x 3 laps, UE support used,    Strength: [x] NT  [] MMT completed:   ROM: [x] NT  [] ROM measurements:         Manual:   Manual therapy  PROM: PROM 12 min    *Indicates exercise, modality, or manual techniques to be initiated when appropriate    Assessment:         Body structures, Functions, Activity limitations: Decreased functional mobility , Decreased ROM, Decreased strength, Decreased endurance, Decreased balance, Decreased coordination, Increased pain  Assessment: initiated exercises per POC, performed gait in //bars, vc's for techniqe and posture w/ good carryover, good tolerance to session, relief stated post tx  Treatment Diagnosis: impaired mobility, imbalance, decreased knee ROM, LE weakness  Prognosis: Good       Goals:  Short term goals  Time Frame for Short term goals: 2 wks  Short term goal 1: Independent with HEP  Short term goal 2: Report 25% reduction in pain with activities and movement  Short term goal 3: Improve Lt knee ROM  to improve transfers    Long term goals  Time Frame for Long term goals : 6 wks  Long term goal 1: Improve Lt knee ROM 5-120 to improve gait quality and stairs  Long term goal 2: Improve Lt LE strength >/= 4+/5 to improve gait and balance  Long term goal 3: Ambulate 500 ft independently with safest AD with improved step length, step height, and knee flexion to advance  Long term goal 4: up/ down 4-6\" steps x3 with 1 HR independently  Long term goal 5: Archibald >/= 45/56 to demonstrate improved balance with decreased risk for falls  Progress toward goals:ongoing    POST-PAIN       Pain Rating (0-10 pain scale):  0 /10   Location and pain description same as pre-treatment unless indicated. Action: [x] NA   [] Perform HEP  [] Meds as prescribed  [] Modalities as prescribed   [] Call Physician     Frequency/Duration:  Plan  Times per week: 2  Plan weeks: 4-5  Current Treatment Recommendations: Strengthening, Balance Training, Functional Mobility Training, ROM, Gait Training, Stair training, Neuromuscular Re-education, Home Exercise Program, Safety Education & Training, Patient/Caregiver Education & Training, Equipment Evaluation, Education, & procurement, Modalities, Manual Therapy - Soft Tissue Mobilization  Plan Comment: Transfer care of pt to [facility], PT     Pt to continue current HEP. See objective section for any therapeutic exercise changes, additions or modifications this date.          PT Individual Minutes  Time In: 1120  Time Out: 2221  Minutes: 38  Timed Code Treatment Minutes: 38 Minutes  Procedure Minutes: 0     Timed Activity Minutes Units   Ther Ex 16 1   Gait 10 1   Manual  12 1       Signature:  Electronically signed by Payal Clancy PTA on 7/9/21 at 12:26 PM EDT

## 2021-07-12 ENCOUNTER — HOSPITAL ENCOUNTER (OUTPATIENT)
Dept: PHYSICAL THERAPY | Age: 81
Setting detail: THERAPIES SERIES
Discharge: HOME | End: 2021-07-12
Payer: MEDICARE

## 2021-07-12 LAB
BLOOD CULTURE, ROUTINE: NORMAL
CULTURE, BLOOD 2: NORMAL

## 2021-07-12 PROCEDURE — 97110 THERAPEUTIC EXERCISES: CPT

## 2021-07-12 PROCEDURE — 97140 MANUAL THERAPY 1/> REGIONS: CPT

## 2021-07-12 PROCEDURE — 97016 VASOPNEUMATIC DEVICE THERAPY: CPT

## 2021-07-12 NOTE — PROGRESS NOTES
[address] [address]  Outpatient Physical Therapy    Treatment Note        Date: 2021  Patient: Kacy Fernández  : 1940  ACCT #: [de-identified]  Referring Practitioner: Dr. Mona Melendez  Diagnosis: primary OA of Lt knee, s/p Lt TKR  Treatment Diagnosis: impaired mobility, imbalance, decreased knee ROM, LE weakness    Visit Information:  PT Visit Information  Onset Date: 21  PT Insurance Information: [facility] Medicare  Total # of Visits Approved: 1 (eval only)  Total # of Visits to Date: 3  Plan of Care/Certification Expiration Date: 21  No Show: 0  Canceled Appointment: 1  Progress Note Counter:  (9 visits approved  [email or web address])    Subjective: no pain currently just stiff and tight     HEP Compliance:  [x] Good [] Fair [] Poor [] Reports not doing due to:    Vital Signs  Patient Currently in Pain: Denies   Pain Screening  Patient Currently in Pain: Denies    OBJECTIVE:   Exercises  Exercise 1: QS with towel roll under heel 5 sec x 20  Exercise 2: knee flexion slides with washcloth and board 5 sec x 15  Exercise 3: Nu-Step, L-2, 5 min  Exercise 5: HS stretch 30 sec x 3, seated  Exercise 7: SLR 2 x 5  Exercise 8: step ups F/L x 15, 2 in step  Exercise 11: tilt bd 3-way x 15, small  Exercise 12: bridges 2 x 5         Strength: [x] NT  [] MMT completed:      ROM: [] NT  [x] ROM measurements:   AROM LLE (degrees)  LLE General AROM: knee flexion 92 deg, supine         Manual:   Manual therapy  PROM: PROM 6 min    Modalities:  Modalities  Other: Game ready, 10 min, pre 43.4 cm, post 42.6 cm     *Indicates exercise, modality, or manual techniques to be initiated when appropriate    Assessment:         Body structures, Functions, Activity limitations: Decreased functional mobility , Decreased ROM, Decreased strength, Decreased endurance, Decreased balance, Decreased coordination, Increased pain  Assessment: added, step ups, vc's for sequencing, tilt bd 3-way, HS stretch, SLR's and bridges to improve LE strength and ROM, unable to perform S/L abd due to pain in right hip, improved tolerance noted, conclude w/ CP/int comp to reduce inflammation  Treatment Diagnosis: impaired mobility, imbalance, decreased knee ROM, LE weakness  Prognosis: Good       Goals:  Short term goals  Time Frame for Short term goals: 2 wks  Short term goal 1: Independent with HEP  Short term goal 2: Report 25% reduction in pain with activities and movement  Short term goal 3: Improve Lt knee ROM  to improve transfers    Long term goals  Time Frame for Long term goals : 6 wks  Long term goal 1: Improve Lt knee ROM 5-120 to improve gait quality and stairs  Long term goal 2: Improve Lt LE strength >/= 4+/5 to improve gait and balance  Long term goal 3: Ambulate 500 ft independently with safest AD with improved step length, step height, and knee flexion to advance  Long term goal 4: up/ down 4-6\" steps x3 with 1 HR independently  Long term goal 5: Archibald >/= 45/56 to demonstrate improved balance with decreased risk for falls  Progress toward goals:improved ROM as noted    POST-PAIN       Pain Rating (0-10 pain scale):  2 /10   Location and pain description same as pre-treatment unless indicated. Action: [] NA   [x] Perform HEP  [] Meds as prescribed  [] Modalities as prescribed   [] Call Physician     Frequency/Duration:  Plan  Times per week: 2  Plan weeks: 4-5  Current Treatment Recommendations: Strengthening, Balance Training, Functional Mobility Training, ROM, Gait Training, Stair training, Neuromuscular Re-education, Home Exercise Program, Safety Education & Training, Patient/Caregiver Education & Training, Equipment Evaluation, Education, & procurement, Modalities, Manual Therapy - Soft Tissue Mobilization  Plan Comment: Transfer care of pt to [facility], PT     Pt to continue current HEP. See objective section for any therapeutic exercise changes, additions or modifications this date.          PT Individual Minutes  Time In: 0916  Time Out: 1004  Minutes: 48  Timed Code Treatment Minutes: 38 Minutes  Procedure Minutes: CP/int comp 10 min     Timed Activity Minutes Units   Ther Ex 32 2   Manual  6 1       Signature:  Electronically signed by Severo Gavel, PTA on 7/12/21 at 10:18 AM EDT

## 2021-07-15 ENCOUNTER — HOSPITAL ENCOUNTER (OUTPATIENT)
Dept: PHYSICAL THERAPY | Age: 81
Setting detail: THERAPIES SERIES
Discharge: HOME | End: 2021-07-15
Payer: MEDICARE

## 2021-07-15 PROCEDURE — 97140 MANUAL THERAPY 1/> REGIONS: CPT

## 2021-07-15 PROCEDURE — 97110 THERAPEUTIC EXERCISES: CPT

## 2021-07-15 PROCEDURE — 97016 VASOPNEUMATIC DEVICE THERAPY: CPT

## 2021-07-15 ASSESSMENT — PAIN SCALES - GENERAL: PAINLEVEL_OUTOF10: 3

## 2021-07-15 ASSESSMENT — PAIN DESCRIPTION - DESCRIPTORS: DESCRIPTORS: SORE

## 2021-07-15 ASSESSMENT — PAIN DESCRIPTION - LOCATION: LOCATION: KNEE

## 2021-07-15 ASSESSMENT — PAIN DESCRIPTION - ORIENTATION: ORIENTATION: LEFT

## 2021-07-15 ASSESSMENT — PAIN DESCRIPTION - PAIN TYPE: TYPE: SURGICAL PAIN

## 2021-07-15 NOTE — PROGRESS NOTES
[address] [address]  Outpatient Physical Therapy    Treatment Note        Date: 7/15/2021  Patient: Mauro Gallegos  : 1940  ACCT #: [de-identified]  Referring Practitioner: Dr. Chadwick Johnson  Diagnosis: primary OA of Lt knee, s/p Lt TKR  Treatment Diagnosis: impaired mobility, imbalance, decreased knee ROM, LE weakness    Visit Information:  PT Visit Information  Onset Date: 21  PT Insurance Information: BCBS Medicare  Total # of Visits Approved: 1 (eval only)  Total # of Visits to Date: 5  Plan of Care/Certification Expiration Date: 21  No Show: 0  Canceled Appointment: 1  Progress Note Counter:  (9 visits approved  [email or web address])    Subjective: pain today is about 3/10, ADL's are getting easier     HEP Compliance:  [x] Good [] Fair [] Poor [] Reports not doing due to:    Vital Signs  Patient Currently in Pain: Yes   Pain Screening  Patient Currently in Pain: Yes  Pain Assessment  Pain Level: 3  Pain Type: Surgical pain  Pain Location: Knee  Pain Orientation: Left  Pain Descriptors: Sore    OBJECTIVE:   Exercises  Exercise 1: QS with towel roll under heel 5 sec x 20  Exercise 3: Nu-Step, L-3, 5 min  Exercise 5: HS stretch 20 sec x 3, seated  Exercise 13: step stretch 10 sec x 10 w/ 6in step  Exercise 14: heel slides 20 sec x 5, on slide bd w/ strap       Strength: [] NT  [x] MMT completed:     Strength LLE  Comment: SLR 4/5             ROM: [] NT  [x] ROM measurements:        PROM LLE (degrees)  LLE General PROM: knee flexion 100 deg, seated  AROM LLE (degrees)  LLE General AROM: knee flexion 93 deg, seated                 Manual:   Manual therapy  PROM: PROM 10 min    Modalities:  Modalities  Other: Game ready, 10 min, pre 43.4 cm, post 42.6 cm     *Indicates exercise, modality, or manual techniques to be initiated when appropriate    Assessment:         Body structures, Functions, Activity limitations: Decreased functional mobility , Decreased ROM, Decreased strength, Decreased endurance, Decreased balance, Decreased coordination, Increased pain  Assessment: improved gait pattern w/ [address] noted, con't w/ current ex and added, step stretch on 6 in step and heel slides w/ strap to improve ROM, good tolerance, conclude w/ CP/int comp to reduce inflammation  Treatment Diagnosis: impaired mobility, imbalance, decreased knee ROM, LE weakness  Prognosis: Good       Goals:  Short term goals  Time Frame for Short term goals: 2 wks  Short term goal 1: Independent with HEP  Short term goal 2: Report 25% reduction in pain with activities and movement  Short term goal 3: Improve Lt knee ROM  to improve transfers    Long term goals  Time Frame for Long term goals : 6 wks  Long term goal 1: Improve Lt knee ROM 5-120 to improve gait quality and stairs  Long term goal 2: Improve Lt LE strength >/= 4+/5 to improve gait and balance  Long term goal 3: Ambulate 500 ft independently with safest AD with improved step length, step height, and knee flexion to advance  Long term goal 4: up/ down 4-6\" steps x3 with 1 HR independently  Long term goal 5: Archibald >/= 45/56 to demonstrate improved balance with decreased risk for falls  Progress toward goals:improved strength and ROM    POST-PAIN       Pain Rating (0-10 pain scale):   6/10   Location and pain description same as pre-treatment unless indicated. Action: [] NA   [x] Perform HEP  [x] Meds as prescribed  [] Modalities as prescribed   [] Call Physician     Frequency/Duration:  Plan  Times per week: 2  Plan weeks: 4-5  Current Treatment Recommendations: Strengthening, Balance Training, Functional Mobility Training, ROM, Gait Training, Stair training, Neuromuscular Re-education, Home Exercise Program, Safety Education & Training, Patient/Caregiver Education & Training, Equipment Evaluation, Education, & procurement, Modalities, Manual Therapy - Soft Tissue Mobilization  Plan Comment: Transfer care of pt to [facility], PT     Pt to continue current HEP.   See objective section for any therapeutic exercise changes, additions or modifications this date.          PT Individual Minutes  Time In: 0920  Time Out: 1008  Minutes: 48  Timed Code Treatment Minutes: 38 Minutes  Procedure Minutes: CP/int comp 10 min     Timed Activity Minutes Units   Ther Ex 28 2   Manual  10 1       Signature:  Electronically signed by Berhane Keller PTA on 7/15/21 at 9:59 AM EDT

## 2021-07-17 NOTE — PROGRESS NOTES
[address] [facility]. [address] [place] [address], [facility]    6/17/2021    Jaylon Lyon  is a 78 y.o. in the [place] being seen for a f/u of   Chief Complaint   Patient presents with    Other     rehab for knee       HPI  The pt is here for rehab after their hospitalization for total knee, healing no infection. Pain under better control    They are up in wheel chair but using walker for ambulation with therapy. They are eating and drinking OK per nursing. Have had no recent choking or dysphagia issues. NO agitation or unusual mental behavioral issues. Incision healing well. PT reports they are progressing with ambulation. OT reports they are progressing with ADLs. Family supportive. Plans to go home after rehab.      Past Medical History:   Diagnosis Date    Allergic rhinitis     Carotid artery occlusion     Carpal tunnel syndrome, bilateral     Colitis, ischemic (HCC)     DJD (degenerative joint disease)     H/O: psoriasis     History of bad fall 2009    MULTIPLE CONTUSIONS    History of diverticulitis of colon     History of kidney stones     History of mammography, screening 2010    CAT 2    History of osteopenia     History of shingles     Hyperlipidemia     Hypertension     Hypothyroidism     Renal failure     MILD     Past Surgical History:   Procedure Laterality Date    BREAST SURGERY  80'S    R BREAST LUMPECTOMY    COLONOSCOPY  01/01/2010    SIGMOID DIVERTICULOSIS    HYSTERECTOMY  1976-PARTIAL    BENIGN TUMOR    JOINT REPLACEMENT Right 08/01/2012    right knee    JOINT REPLACEMENT Left 06/01/2021    left knee    KNEE ARTHROSCOPY  1992-RIGHT    TONSILLECTOMY      UPPER GASTROINTESTINAL ENDOSCOPY  01/01/2010    GEN NORMAL,GASTRIC MUCOSAL ERYTHEMIA     Family History   Problem Relation Age of Onset    Cancer Mother         BREAST    Diabetes Mother     Heart Attack Father     Cancer Sister         BREAST     Social History     Socioeconomic History    under control with Oxy 5mg q 4-6 h prn , edema around knee better  Psych: at baseline  lucid    Physical exam:   176lb  135/69  97.6  75  17  Constitutional: Awake and alert sitting up in chair, general weakness  HEENT: Normocephalic, intact facial symmetry, EOMs intact, sclera non icteric, pupils are equal and round, buccal mucosa pink and moist  Speech clear    NECK: - carotid bruit, euthyroid, no mass visualized  Cardiovascular: Regular rate S1S2 normal, NO S3S4  Respiratory: LCTA, even unlabored respirations, no accessory muscle use noted  GI: abdomen NT, +BS x 4 Q, ND  : incontinent of urine  Extremities: no ankle edema, PPP, knee edema with warmth from inflammation, looks good  SKELETAL: no redness, or swelling over other joints. Limited ROM but spontaneous movement x 4   Psych: pleasant, good judgement, normal thought content  SKIN: no gross skin lesions noted on visualized skin, warm and dry    ASSESSMENT:     Diagnosis Orders   1. Acute pain of left knee     2. Mixed hyperlipidemia     3. Generalized arthritis     4. Hypothyroidism due to acquired atrophy of thyroid     5. Essential hypertension         PLAN:  Pt/POA agrees with POC   Under control will re new Oxy 5mg prn  Tolerates statin no myalgias, bi annual FLP  General arthritis is not bothersome to pt , no other pain issues at this time  Tolerates synthroid bi annual TSH  bp inogoal, continue antihypertensives      adhere to the JNC VIII guidelines for HTN management and the NCEP ATP III guidelines for LDL-C management. Continue PT ot weekly labs   Return if symptoms worsen or fail to improve. Pertinent POC, medications, labs, have been reviewed, continue same. Encourage fluids and good nutrition. Stress fall prevention strategies.     BO Solis-CNP      Bisi Villegas, DNP, MSN, RN, GNP-BC, NP-C  Adult/[place] Nurse Practitioner  [facility] Himanshu SINHA  Department of Geriatrics  8:30am-4:30pm   [phone]  After hours Answering service [phone]

## 2021-07-19 ENCOUNTER — HOSPITAL ENCOUNTER (OUTPATIENT)
Dept: PHYSICAL THERAPY | Age: 81
Setting detail: THERAPIES SERIES
Discharge: HOME | End: 2021-07-19
Payer: MEDICARE

## 2021-07-19 PROCEDURE — 97140 MANUAL THERAPY 1/> REGIONS: CPT

## 2021-07-19 PROCEDURE — 97110 THERAPEUTIC EXERCISES: CPT

## 2021-07-22 ENCOUNTER — HOSPITAL ENCOUNTER (OUTPATIENT)
Dept: PHYSICAL THERAPY | Age: 81
Setting detail: THERAPIES SERIES
Discharge: HOME | End: 2021-07-22
Payer: MEDICARE

## 2021-07-22 PROCEDURE — 97110 THERAPEUTIC EXERCISES: CPT

## 2021-07-22 PROCEDURE — 97140 MANUAL THERAPY 1/> REGIONS: CPT

## 2021-07-22 PROCEDURE — 97016 VASOPNEUMATIC DEVICE THERAPY: CPT

## 2021-07-22 ASSESSMENT — PAIN DESCRIPTION - ORIENTATION: ORIENTATION: LEFT

## 2021-07-22 ASSESSMENT — PAIN DESCRIPTION - LOCATION: LOCATION: KNEE

## 2021-07-22 ASSESSMENT — PAIN DESCRIPTION - PAIN TYPE: TYPE: SURGICAL PAIN

## 2021-07-22 ASSESSMENT — PAIN DESCRIPTION - DESCRIPTORS: DESCRIPTORS: SORE;TIGHTNESS

## 2021-07-22 ASSESSMENT — PAIN SCALES - GENERAL: PAINLEVEL_OUTOF10: 4

## 2021-07-22 NOTE — PROGRESS NOTES
[address] [address]  Outpatient Physical Therapy    Treatment Note        Date: 2021  Patient: Silvia Barton  : 1940  ACCT #: [de-identified]  Referring Practitioner: Dr. Mago Marcos  Diagnosis: primary OA of Lt knee, s/p Lt TKR  Treatment Diagnosis: impaired mobility, imbalance, decreased knee ROM, LE weakness    Visit Information:  PT Visit Information  Onset Date: 21  PT Insurance Information: BCBS Medicare  Total # of Visits Approved: 1 (eval only)  Total # of Visits to Date: 7  Plan of Care/Certification Expiration Date: 21  No Show: 0  Canceled Appointment: 1  Progress Note Counter:  (9 visits approved  [id number])    Subjective: pain today is 4/10  Comments: RTD   HEP Compliance:  [x] Good [] Fair [] Poor [] Reports not doing due to:    Vital Signs  Patient Currently in Pain: Yes   Pain Screening  Patient Currently in Pain: Yes  Pain Assessment  Pain Level: 4  Pain Type: Surgical pain  Pain Location: Knee  Pain Orientation: Left  Pain Descriptors: Sore;Tightness    OBJECTIVE:   Exercises  Exercise 3: Nu-Step, L-3, 5 min  Exercise 5: HS stretch 20 sec x 3, seated  Exercise 6: sink ex x 10  Exercise 8: step ups F/L x 15, 2 in step  Exercise 10: ambulate w/ st cane 200 ft, x 3 laps in //bars w/o UE support w/ mirror         Strength: [] NT  [x] MMT completed:     Strength LLE  L Knee Flexion: 4/5  L Knee Extension: 4/5        ROM: [x] NT  [] ROM measurements:         Manual:   Manual therapy  PROM: PROM 10 min    Modalities:  Modalities  Other: Game ready, 10 min, pre 44.0 cm, post 43.3 cm     *Indicates exercise, modality, or manual techniques to be initiated when appropriate    Assessment:         Body structures, Functions, Activity limitations: Decreased functional mobility , Decreased ROM, Decreased strength, Decreased endurance, Decreased balance, Decreased coordination, Increased pain  Assessment: educated/ demo'ed gait w/ st cane, patient w/ good technique, sequencing, vc's to keep head up, no LOB, improved ability to perform step ups today, good tolerance to session, conclude w/ CP/int comp to reduce inflammation  Treatment Diagnosis: impaired mobility, imbalance, decreased knee ROM, LE weakness  Prognosis: Good       Goals:  Short term goals  Time Frame for Short term goals: 2 wks  Short term goal 1: Independent with HEP  Short term goal 2: Report 25% reduction in pain with activities and movement  Short term goal 3: Improve Lt knee ROM  to improve transfers    Long term goals  Time Frame for Long term goals : 6 wks  Long term goal 1: Improve Lt knee ROM 5-120 to improve gait quality and stairs  Long term goal 2: Improve Lt LE strength >/= 4+/5 to improve gait and balance  Long term goal 3: Ambulate 500 ft independently with safest AD with improved step length, step height, and knee flexion to advance  Long term goal 4: up/ down 4-6\" steps x3 with 1 HR independently  Long term goal 5: Archibald >/= 45/56 to demonstrate improved balance with decreased risk for falls  Progress toward goals:improved strength as noted    POST-PAIN       Pain Rating (0-10 pain scale):   0/10   Location and pain description same as pre-treatment unless indicated. Action: [x] NA   [] Perform HEP  [] Meds as prescribed  [] Modalities as prescribed   [] Call Physician     Frequency/Duration:  Plan  Times per week: 2  Plan weeks: 4-5  Current Treatment Recommendations: Strengthening, Balance Training, Functional Mobility Training, ROM, Gait Training, Stair training, Neuromuscular Re-education, Home Exercise Program, Safety Education & Training, Patient/Caregiver Education & Training, Equipment Evaluation, Education, & procurement, Modalities, Manual Therapy - Soft Tissue Mobilization  Plan Comment: Transfer care of pt to Kasey Zaldivar, PT     Pt to continue current HEP. See objective section for any therapeutic exercise changes, additions or modifications this date.          PT Individual Minutes  Time In: 0920  Time Out: 1008  Minutes: 48  Timed Code Treatment Minutes: 38 Minutes  Procedure Minutes: CP/int comp 10 min     Timed Activity Minutes Units   Ther Ex 28 2   Manual  10 1       Signature:  Electronically signed by Emily Mckeon PTA on 7/22/21 at 10:06 AM EDT

## 2021-07-26 ENCOUNTER — HOSPITAL ENCOUNTER (OUTPATIENT)
Dept: PHYSICAL THERAPY | Age: 81
Setting detail: THERAPIES SERIES
Discharge: HOME | End: 2021-07-26
Payer: MEDICARE

## 2021-07-26 PROCEDURE — 97016 VASOPNEUMATIC DEVICE THERAPY: CPT

## 2021-07-26 PROCEDURE — 97110 THERAPEUTIC EXERCISES: CPT

## 2021-07-26 ASSESSMENT — PAIN DESCRIPTION - DESCRIPTORS: DESCRIPTORS: SORE;TIGHTNESS

## 2021-07-26 ASSESSMENT — PAIN DESCRIPTION - ORIENTATION: ORIENTATION: LEFT

## 2021-07-26 ASSESSMENT — PAIN DESCRIPTION - LOCATION: LOCATION: KNEE

## 2021-07-26 ASSESSMENT — PAIN SCALES - GENERAL: PAINLEVEL_OUTOF10: 4

## 2021-07-26 ASSESSMENT — PAIN DESCRIPTION - PAIN TYPE: TYPE: SURGICAL PAIN

## 2021-07-26 NOTE — PROGRESS NOTES
Modalities:  Modalities  Other: Game ready, 10 min, pre 46.0 cm, post 45.7 cm     *Indicates exercise, modality, or manual techniques to be initiated when appropriate    Assessment: Body structures, Functions, Activity limitations: Decreased functional mobility , Decreased ROM, Decreased strength, Decreased endurance, Decreased balance, Decreased coordination, Increased pain  Assessment: Good compliance with HEP. Stairs not tested secondary to time. Pt reported 27 minutes late for appt due to traffic. Improved scores on LEFS and Archibald. Still has difficulty with SLS 3 sec best. Much agniety about possiblily of needing manipulation. Concluded with Cold compression. Treatment Diagnosis: impaired mobility, imbalance, decreased knee ROM, LE weakness  Prognosis: Good       Goals:  Short term goals  Time Frame for Short term goals: 2 wks  Short term goal 1: Independent with HEP  Short term goal 2: Report 25% reduction in pain with activities and movement  Short term goal 3: Improve Lt knee ROM  to improve transfers    Long term goals  Time Frame for Long term goals : 6 wks  Long term goal 1: Improve Lt knee ROM 5-120 to improve gait quality and stairs  Long term goal 2: Improve Lt LE strength >/= 4+/5 to improve gait and balance  Long term goal 3: Ambulate 500 ft independently with safest AD with improved step length, step height, and knee flexion to advance  Long term goal 4: up/ down 4-6\" steps x3 with 1 HR independently  Long term goal 5: Archibald >/= 45/56 to demonstrate improved balance with decreased risk for falls  Progress toward goals: Progressing towards goals    POST-PAIN       Pain Rating (0-10 pain scale):   5/10   Location and pain description same as pre-treatment unless indicated.    Action: [] NA   [] Perform HEP  [] Meds as prescribed  [] Modalities as prescribed   [] Call Physician     Frequency/Duration:  Plan  Times per week: 2  Plan weeks: 4-5  Current Treatment Recommendations: Strengthening, Balance Training, Functional Mobility Training, ROM, Gait Training, Stair training, Neuromuscular Re-education, Home Exercise Program, Safety Education & Training, Patient/Caregiver Education & Training, Equipment Evaluation, Education, & procurement, Modalities, Manual Therapy - Soft Tissue Mobilization  Plan Comment: Transfer care of pt to Darleen Meigs, PT     Pt to continue current HEP. See objective section for any therapeutic exercise changes, additions or modifications this date. PT Individual Minutes  Time In: 1001  Time Out: [address]  Minutes: 52  Timed Code Treatment Minutes: 40 Minutes  Procedure Minutes: Cold comp.  10     Timed Activity Minutes Units   Ther Ex 40 3     Signature:  Electronically signed by Katelyn Velázquez PTA on 7/26/21 at 12:14 PM EDT

## 2021-07-26 NOTE — PROGRESS NOTES
[address]     Ph: [phone]  Fax: [phone]    [] Certification  [x] Recertification [x]  Plan of Care  [] Progress Note [] Discharge      To:  Dr. Lorna Elias      From:  Alesha Harris PT  Patient: Casey Taylor     : 1940  Diagnosis: primary OA of Lt knee, s/p Lt TKR     Date: 2021  Treatment Diagnosis: impaired mobility, imbalance, decreased knee ROM, LE weakness    Plan of Care/Certification Expiration Date: 21  Progress Report Period from:  2021  to 2021    Total # of Visits to Date: 8   No Show: 0    Canceled Appointment: 1     OBJECTIVE:   Short Term Goals - Time Frame for Short term goals: 2 wks    Goals Current/Discharge status  Met   Short term goal 1: Independent with HEP  Compliant with HEP. [x] yes  [] no   Short term goal 2: Report 25% reduction in pain with activities and movement  Pain Screening  Patient Currently in Pain: Yes  Pain Assessment  Pain Level: 4  Pain Type: Surgical pain  Pain Location: Knee  Pain Orientation: Left  Pain Descriptors: Sore;Tightness [] yes  [x] no   Short term goal 3: Improve Lt knee ROM  to improve transfers  AROM LLE (degrees)  LLE General AROM: knee flexion 6-88 before manual supine. Indep with transfers with extra time.        [] yes  [x] no     Long Term Goals - Time Frame for Long term goals : 6 wks  Goals Current/ Discharge status Met   Long term goal 1: Improve Lt knee ROM 5-120 to improve gait quality and stairs AROM LLE (degrees)  LLE General AROM: knee flexion 6-88 before manual supine       [] yes  [x] no   Long term goal 2: Improve Lt LE strength >/= 4+/5 to improve gait and balance Strength LLE  L Hip Flexion: 4/5, 4+/5  L Knee Flexion: 4+/5, 4/5  L Knee Extension: 4/5, 4+/5  L Ankle Dorsiflexion: 5/5 [x] yes  [x] no   Long term goal 3: Ambulate 500 ft independently with safest AD with improved step length, step height, and knee flexion to advance Ambulation 1  Surface: carpet  Device: Single point cane  Assistance: Independent  Quality of Gait: decreased knee extension during stance, decreased Lt step length and height with decreased knee flexion to advance, decreased speed,decreased weight shift  Distance: 150 ft [] yes  [x] no   Long term goal 4: up/ down 4-6\" steps x3 with 1 HR independently I do stairs non-reciprocally w/ my st cane. [] yes  [x] no   Long term goal 5: Archibald >/= 45/56 to demonstrate improved balance with decreased risk for falls Archibald 46/56 [x] yes  [] no      Body structures, Functions, Activity limitations: Decreased functional mobility , Decreased ROM, Decreased strength, Decreased endurance, Decreased balance, Decreased coordination, Increased pain  Assessment: Patient continues to demonstrate limitations in left knee ROM after stretching. Further PT recommended to continue to work on strength, ROM, and improve gait and balance. Additionally visits requested due to limitations in ROM, strength, and impaired gait. Patient will return to the physician this week. Prognosis: Good  Discharge Recommendations: Continue to assess pending progress           PLAN: [x] Evaluate and Treat  Frequency/Duration:  Plan  Times per week: 2  Plan weeks: 4  Current Treatment Recommendations: Strengthening, Balance Training, Functional Mobility Training, ROM, Gait Training, Stair training, Neuromuscular Re-education, Home Exercise Program, Safety Education & Training, Patient/Caregiver Education & Training, Equipment Evaluation, Education, & procurement, Modalities, Manual Therapy - Soft Tissue Mobilization  Plan Comment: Additionally visit requested     Precautions:                            Patient Status:[] Continue/ Initiate plan of Care    [] Discharge PT. Recommend pt continue with HEP.      [x] Additional visits requested, Please re-certify for additional visits:          Signature: Electronically signed by Gustave Lennox, PTA on 7/26/21 at 12:19 PM EDT  Electronically signed by Graciela Saldana PT on 7/26/2021 at 3:32 PM      If you have any questions or concerns, please don't hesitate to call. Thank you for your referral.    I have reviewed this plan of care and certify a need for medically necessary rehabilitation services.     Physician Signature:__________________________________________________________  Date:  Please sign and return

## 2021-07-29 ENCOUNTER — HOSPITAL ENCOUNTER (OUTPATIENT)
Dept: PHYSICAL THERAPY | Age: 81
Setting detail: THERAPIES SERIES
Discharge: HOME | End: 2021-07-29
Payer: MEDICARE

## 2021-07-29 PROCEDURE — 97110 THERAPEUTIC EXERCISES: CPT

## 2021-07-29 PROCEDURE — 97140 MANUAL THERAPY 1/> REGIONS: CPT

## 2021-07-29 ASSESSMENT — PAIN DESCRIPTION - DESCRIPTORS: DESCRIPTORS: SORE

## 2021-07-29 ASSESSMENT — PAIN DESCRIPTION - LOCATION: LOCATION: KNEE

## 2021-07-29 ASSESSMENT — PAIN DESCRIPTION - ORIENTATION: ORIENTATION: LEFT

## 2021-07-29 ASSESSMENT — PAIN DESCRIPTION - PAIN TYPE: TYPE: SURGICAL PAIN

## 2021-07-29 ASSESSMENT — PAIN SCALES - GENERAL: PAINLEVEL_OUTOF10: 2

## 2021-07-29 NOTE — PROGRESS NOTES
[address] [address]  Outpatient Physical Therapy    Treatment Note        Date: 2021  Patient: Eduardo Foot  : 1940  ACCT #: [de-identified]  Referring Practitioner: Dr. Renetta Iqbal  Diagnosis: primary OA of Lt knee, s/p Lt TKR  Treatment Diagnosis: impaired mobility, imbalance, decreased knee ROM, LE weakness    Visit Information:  PT Visit Information  Onset Date: 21  PT Insurance Information: BCBS Medicare  Total # of Visits Approved: 1 (eval only)  Total # of Visits to Date: 9  Plan of Care/Certification Expiration Date: 21  No Show: 0  Canceled Appointment: 1  Progress Note Counter:  thru (21) per INS    Subjective: slight pain today, bumped my knee trying to avoid our dog, I saw the MD, he is satisfied w/ my progress and wants me to continue w/ therapy     HEP Compliance:  [x] Good [] Fair [] Poor [] Reports not doing due to:    Vital Signs  Patient Currently in Pain: Yes   Pain Screening  Patient Currently in Pain: Yes  Pain Assessment  Pain Level: 2  Pain Type: Surgical pain  Pain Location: Knee  Pain Orientation: Left  Pain Descriptors: Sore    OBJECTIVE:   Exercises  Exercise 3: Nu-Step, L-3, 5 min  Exercise 6: sink ex x 15, w/ circles x 5  Exercise 8: step ups F/L x 15, 4 in step  Exercise 14: heel slides 20 sec x 5, supine w/ strap       Strength: [x] NT  [] MMT completed:        ROM: [x] NT  [] ROM measurements:      Manual:   Manual therapy  PROM: PROM 12 min, supine    Modalities:  Modalities  Other: Game ready, 10 min, pre 45.0 cm, post 44.5 cm     *Indicates exercise, modality, or manual techniques to be initiated when appropriate    Assessment:         Body structures, Functions, Activity limitations: Decreased functional mobility , Decreased ROM, Decreased strength, Decreased endurance, Decreased balance, Decreased coordination, Increased pain  Assessment: increased step ups to 4 in, increased sink ex to 15 reps, good tolerance to session, patient also Minutes  Procedure Minutes:CP/int comp 10 min     Timed Activity Minutes Units   Ther Ex 26 2   Manual  12 1       Signature:  Electronically signed by Nayeli Graff PTA on 7/29/21 at 9:57 AM EDT

## 2021-08-03 ENCOUNTER — HOSPITAL ENCOUNTER (OUTPATIENT)
Dept: PHYSICAL THERAPY | Age: 81
Setting detail: THERAPIES SERIES
Discharge: HOME | End: 2021-08-03
Payer: MEDICARE

## 2021-08-03 ENCOUNTER — APPOINTMENT (OUTPATIENT)
Dept: PHYSICAL THERAPY | Age: 81
End: 2021-08-03
Payer: MEDICARE

## 2021-08-03 PROCEDURE — 97110 THERAPEUTIC EXERCISES: CPT

## 2021-08-03 ASSESSMENT — PAIN DESCRIPTION - ORIENTATION: ORIENTATION: LEFT

## 2021-08-03 ASSESSMENT — PAIN SCALES - GENERAL: PAINLEVEL_OUTOF10: 2

## 2021-08-03 ASSESSMENT — PAIN DESCRIPTION - LOCATION: LOCATION: KNEE

## 2021-08-03 NOTE — PROGRESS NOTES
[address] [address]  Outpatient Physical Therapy    Treatment Note        Date: 8/3/2021  Patient: Monroe Jenkins  : 1940  ACCT #: [de-identified]  Referring Practitioner: Dr. Pedro Luis Price  Diagnosis: primary OA of Lt knee, s/p Lt TKR  Treatment Diagnosis: impaired mobility, imbalance, decreased knee ROM, LE weakness    Visit Information:  PT Visit Information  Onset Date: 21  PT Insurance Information: BCBS Medicare  Total # of Visits Approved: 1 (eval only)  Total # of Visits to Date: 10  Plan of Care/Certification Expiration Date: 21  No Show: 0  Canceled Appointment: 1  Progress Note Counter: / thru (21) per INS    Subjective: slight pain today     HEP Compliance:  [x] Good [] Fair [] Poor [] Reports not doing due to:    Vital Signs  Patient Currently in Pain: Yes   Pain Screening  Patient Currently in Pain: Yes  Pain Assessment  Pain Level: 2  Pain Location: Knee  Pain Orientation: Left    OBJECTIVE:   Exercises  Exercise 3: Nu-Step, L-3, 5 min  Exercise 6: sink ex x 15, w/ circles x 5  Exercise 8: step ups F x 15, 4 in step- cues for technique- circumduction L and increased reliance on UE exhibited  Exercise 14: heel slides 20 sec x 10, supine w/ strap  Exercise 15: amb in gym without AD- initial steps antalgic; improved gait pattern with increased distance  Exercise 16: 8 in L foot tap for increased L knee flexion AROM X 15 with unilat UE support- circumduction exhibited  Exercise 17: LAQ X 15  Exercise 18: rocker board side/fwd X 10 ea for increased weight shifting L LE    Stairs  # Steps : 4  Stairs Height: 6\"  Rails: Bilateral  Device: No Device  Assistance: Minimal assistance  Comment: attempted reciprocal pattern- L knee buckling during ascend and L knee with decreased eccentric strength and decreased L knee flexion ROM to complete descend    Strength: [x] NT  [] MMT completed:     ROM: [] NT  [x] ROM measurements:  PROM RLE (degrees)  RLE General PROM: R -10 to 92 deg    Modalities:  Modalities  Other: Game ready, 10 min, pre 43.1 cm, post 42.5 cm   *Indicates exercise, modality, or manual techniques to be initiated when appropriate    Assessment: Body structures, Functions, Activity limitations: Decreased functional mobility , Decreased ROM, Decreased strength, Decreased endurance, Decreased balance, Decreased coordination, Increased pain  Assessment: Pt stated using cane in community only. Pt stated returned to driving yesterday. Pt continues with non reciprocal pattern with stair negotiation due to L LE weakness and decreased knee flexion ROM. Pt with increased pain with L knee flexion ROM with strap. Pt continues with decreased weight shifting and stance time L LE during gait requiring continued training for correction. Treatment Diagnosis: impaired mobility, imbalance, decreased knee ROM, LE weakness  Prognosis: Good       Goals:  Short term goals  Time Frame for Short term goals: 2 wks  Short term goal 1: Independent with HEP  Short term goal 2: Report 25% reduction in pain with activities and movement  Short term goal 3: Improve Lt knee ROM  to improve transfers    Long term goals  Time Frame for Long term goals : 6 wks  Long term goal 1: Improve Lt knee ROM 5-120 to improve gait quality and stairs  Long term goal 2: Improve Lt LE strength >/= 4+/5 to improve gait and balance  Long term goal 3: Ambulate 500 ft independently with safest AD with improved step length, step height, and knee flexion to advance  Long term goal 4: up/ down 4-6\" steps x3 with 1 HR independently  Long term goal 5: Archibald >/= 45/56 to demonstrate improved balance with decreased risk for falls  Progress toward goals: ongoing, progressing strength and ROM    POST-PAIN       Pain Rating (0-10 pain scale):   4/10   Location and pain description same as pre-treatment unless indicated.    Action: [] NA   [x] Perform HEP  [x] Meds as prescribed  [x] Modalities as prescribed   [] Call Physician     Frequency/Duration:  Plan  Times per week: 2  Plan weeks: 4-5  Current Treatment Recommendations: Strengthening, Balance Training, Functional Mobility Training, ROM, Gait Training, Stair training, Neuromuscular Re-education, Home Exercise Program, Safety Education & Training, Patient/Caregiver Education & Training, Equipment Evaluation, Education, & procurement, Modalities, Manual Therapy - Soft Tissue Mobilization  Plan Comment: Transfer care of pt to [place] Ringgold, PT     Pt to continue current HEP. See objective section for any therapeutic exercise changes, additions or modifications this date.     PT Individual Minutes  Time In: 6058  Time Out: 1130  Minutes: 49  Timed Code Treatment Minutes: 39 Minutes  Procedure Minutes: pneumatic compression X 10 min     Timed Activity Minutes Units   Ther Ex 39 3       Signature:  Electronically signed by [place] Ringgold, PT on 8/3/21 at 11:22 AM EDT

## 2021-08-06 ENCOUNTER — HOSPITAL ENCOUNTER (OUTPATIENT)
Dept: PHYSICAL THERAPY | Age: 81
Setting detail: THERAPIES SERIES
Discharge: HOME | End: 2021-08-06
Payer: MEDICARE

## 2021-08-06 PROCEDURE — 97016 VASOPNEUMATIC DEVICE THERAPY: CPT

## 2021-08-06 PROCEDURE — 97140 MANUAL THERAPY 1/> REGIONS: CPT

## 2021-08-06 PROCEDURE — 97110 THERAPEUTIC EXERCISES: CPT

## 2021-08-06 ASSESSMENT — PAIN DESCRIPTION - PAIN TYPE: TYPE: SURGICAL PAIN

## 2021-08-06 ASSESSMENT — PAIN DESCRIPTION - LOCATION: LOCATION: KNEE

## 2021-08-06 ASSESSMENT — PAIN DESCRIPTION - DESCRIPTORS: DESCRIPTORS: SORE;TIGHTNESS

## 2021-08-06 ASSESSMENT — PAIN DESCRIPTION - ORIENTATION: ORIENTATION: LEFT

## 2021-08-06 ASSESSMENT — PAIN SCALES - GENERAL: PAINLEVEL_OUTOF10: 3

## 2021-08-06 NOTE — PROGRESS NOTES
[address] [address]  Outpatient Physical Therapy    Treatment Note        Date: 2021  Patient: Jaylon Lyon  : 1940  ACCT #: [de-identified]  Referring Practitioner: Dr. Sarah Delvalle  Diagnosis: primary OA of Lt knee, s/p Lt TKR  Treatment Diagnosis: impaired mobility, imbalance, decreased knee ROM, LE weakness    Visit Information:  PT Visit Information  Onset Date: 21  PT Insurance Information: BCBS Medicare  Total # of Visits Approved: 1 (eval only)  Total # of Visits to Date: 11  Plan of Care/Certification Expiration Date: 21  No Show: 0  Canceled Appointment: 1  Progress Note Counter: 3/6 thru (21) per INS    Subjective: slight pain today, 2-3/10     HEP Compliance:  [x] Good [] Fair [] Poor [] Reports not doing due to:    Vital Signs  Patient Currently in Pain: Yes   Pain Screening  Patient Currently in Pain: Yes  Pain Assessment  Pain Level: 3  Pain Type: Surgical pain  Pain Location: Knee  Pain Orientation: Left  Pain Descriptors: Sore;Tightness    OBJECTIVE:   Exercises  Exercise 3: Nu-Step, L-3, 5 min  Exercise 8: step ups F x 15, 4 in step- cues for technique  Exercise 14: heel slides 20 sec x 5, w/ slide bd, seated  Exercise 16: toe taps x 15 ea w/ 6in and 8 in step  Exercise 18: rocker board 3-way x 20, Lg     Strength: [x] NT  [] MMT completed:     ROM: [] NT  [x] ROM measurements:      AROM LLE (degrees)  LLE General AROM: ext is lacking 8 deg, flexion 93 deg, supine         Manual:   Manual therapy  PROM: PROM 12 min, supine and seated    Modalities:  Modalities  Other: Game ready, 10 min, pre 44.2 cm, post 42.3 cm     *Indicates exercise, modality, or manual techniques to be initiated when appropriate    Assessment:         Body structures, Functions, Activity limitations: Decreased functional mobility , Decreased ROM, Decreased strength, Decreased endurance, Decreased balance, Decreased coordination, Increased pain  Assessment: vc's for sequencing w/ step In: 7249  Time Out: 0840  Minutes: 48  Timed Code Treatment Minutes: 38 Minutes  Procedure Minutes:CP/int comp 10 min     Timed Activity Minutes Units   Ther Ex 26 2   Manual  12 1       Signature:  Electronically signed by Andrews Calderon PTA on 8/6/21 at 8:31 AM EDT

## 2021-08-10 ENCOUNTER — HOSPITAL ENCOUNTER (OUTPATIENT)
Dept: PHYSICAL THERAPY | Age: 81
Setting detail: THERAPIES SERIES
Discharge: HOME | End: 2021-08-10
Payer: MEDICARE

## 2021-08-10 PROCEDURE — 97016 VASOPNEUMATIC DEVICE THERAPY: CPT

## 2021-08-10 PROCEDURE — 97140 MANUAL THERAPY 1/> REGIONS: CPT

## 2021-08-10 PROCEDURE — 97110 THERAPEUTIC EXERCISES: CPT

## 2021-08-10 ASSESSMENT — PAIN DESCRIPTION - DESCRIPTORS: DESCRIPTORS: SORE;TIGHTNESS

## 2021-08-10 ASSESSMENT — PAIN DESCRIPTION - ORIENTATION: ORIENTATION: LEFT

## 2021-08-10 ASSESSMENT — PAIN DESCRIPTION - LOCATION: LOCATION: KNEE

## 2021-08-10 ASSESSMENT — PAIN SCALES - GENERAL: PAINLEVEL_OUTOF10: 3

## 2021-08-10 ASSESSMENT — PAIN DESCRIPTION - PAIN TYPE: TYPE: SURGICAL PAIN

## 2021-08-10 NOTE — PROGRESS NOTES
[id number] [address]  Outpatient Physical Therapy    Treatment Note        Date: 8/10/2021  Patient: Joanna Mike  : 1940  ACCT #: [de-identified]  Referring Practitioner: Dr. Aristides Colbert  Diagnosis: primary OA of Lt knee, s/p Lt TKR  Treatment Diagnosis: impaired mobility, imbalance, decreased knee ROM, LE weakness    Visit Information:  PT Visit Information  Onset Date: 21  PT Insurance Information: BCBS Medicare  Total # of Visits Approved: 1 (eval only)  Total # of Visits to Date: 12  Plan of Care/Certification Expiration Date: 21  No Show: 0  Canceled Appointment: 1  Progress Note Counter: /6 thru (21) per INS    Subjective: slight pain today, 2-3/10, the rain probably is not helping     HEP Compliance:  [x] Good [] Fair [] Poor [] Reports not doing due to:    Vital Signs  Patient Currently in Pain: Yes   Pain Screening  Patient Currently in Pain: Yes  Pain Assessment  Pain Level: 3  Pain Type: Surgical pain  Pain Location: Knee  Pain Orientation: Left  Pain Descriptors: Sore;Tightness    OBJECTIVE:   Exercises  Exercise 3: Nu-Step, L-4, 5 min  Exercise 4: sit to stands x 10  Exercise 7: SLR x 20  Exercise 11: p-ball HS curls 5 sec x 15  Exercise 12: bridges x10  Exercise 15: amb in gym without AD- initial steps antalgic; improved gait pattern with increased distance  Exercise 18: rocker board 3-way x 20, Lg                 Strength: [] NT  [x] MMT completed:     Strength LLE  L Knee Flexion: 4/5  L Knee Extension: 4+/5      ROM: [] NT  [x] ROM measurements:      AROM LLE (degrees)  LLE General AROM: ext is lacking 7 deg, flexion 94 deg, supine                 Manual:   Manual therapy  PROM: PROM, 8min ext,flexion w/ overpressure    Modalities:  Modalities  Other: Game ready, 10 min, pre 43.1m, post 42.8cm     *Indicates exercise, modality, or manual techniques to be initiated when appropriate    Assessment:         Body structures, Functions, Activity limitations: Decreased functional mobility , Decreased ROM, Decreased strength, Decreased endurance, Decreased balance, Decreased coordination, Increased pain  Assessment: increased Nu-step to L-4, added, sit to stands, good technique noted, p-ball HS curls and increased reps w/ SLR, good tolerance to session, conclude w/ CP/int comp to reduce inflammation  Treatment Diagnosis: impaired mobility, imbalance, decreased knee ROM, LE weakness  Prognosis: Good       Goals:  Short term goals  Time Frame for Short term goals: 2 wks  Short term goal 1: Independent with HEP  Short term goal 2: Report 25% reduction in pain with activities and movement  Short term goal 3: Improve Lt knee ROM  to improve transfers    Long term goals  Time Frame for Long term goals : 6 wks  Long term goal 1: Improve Lt knee ROM 5-120 to improve gait quality and stairs  Long term goal 2: Improve Lt LE strength >/= 4+/5 to improve gait and balance  Long term goal 3: Ambulate 500 ft independently with safest AD with improved step length, step height, and knee flexion to advance  Long term goal 4: up/ down 4-6\" steps x3 with 1 HR independently  Long term goal 5: Archibald >/= 45/56 to demonstrate improved balance with decreased risk for falls  Progress toward goals:improved knee ext strength    POST-PAIN       Pain Rating (0-10 pain scale): 2  /10   Location and pain description same as pre-treatment unless indicated.    Action: [] NA   [x] Perform HEP  [] Meds as prescribed  [] Modalities as prescribed   [] Call Physician     Frequency/Duration:  Plan  Times per week: 2  Plan weeks: 4-5  Current Treatment Recommendations: Strengthening, Balance Training, Functional Mobility Training, ROM, Gait Training, Stair training, Neuromuscular Re-education, Home Exercise Program, Safety Education & Training, Patient/Caregiver Education & Training, Equipment Evaluation, Education, & procurement, Modalities, Manual Therapy - Soft Tissue Mobilization  Plan Comment: Transfer care of pt to [facility], PT     Pt to continue current HEP. See objective section for any therapeutic exercise changes, additions or modifications this date.          PT Individual Minutes  Time In: 6892  Time Out: 8206  Minutes: 48  Timed Code Treatment Minutes: 38 Minutes  Procedure Minutes:CP/int comp 10 min     Timed Activity Minutes Units   Ther Ex 30 2   Manual  8 1       Signature:  Electronically signed by Berhane Keller PTA on 8/10/21 at 9:26 AM EDT

## 2021-08-13 ENCOUNTER — HOSPITAL ENCOUNTER (OUTPATIENT)
Dept: PHYSICAL THERAPY | Age: 81
Setting detail: THERAPIES SERIES
Discharge: HOME | End: 2021-08-13
Payer: MEDICARE

## 2021-08-13 PROCEDURE — 97110 THERAPEUTIC EXERCISES: CPT

## 2021-08-13 PROCEDURE — 97140 MANUAL THERAPY 1/> REGIONS: CPT

## 2021-08-13 PROCEDURE — 97016 VASOPNEUMATIC DEVICE THERAPY: CPT

## 2021-08-13 ASSESSMENT — PAIN DESCRIPTION - ORIENTATION: ORIENTATION: LEFT

## 2021-08-13 ASSESSMENT — PAIN DESCRIPTION - DESCRIPTORS: DESCRIPTORS: SORE

## 2021-08-13 ASSESSMENT — PAIN SCALES - GENERAL: PAINLEVEL_OUTOF10: 3

## 2021-08-13 ASSESSMENT — PAIN DESCRIPTION - LOCATION: LOCATION: KNEE

## 2021-08-13 ASSESSMENT — PAIN DESCRIPTION - PAIN TYPE: TYPE: SURGICAL PAIN

## 2021-08-13 NOTE — PROGRESS NOTES
[address] [address]  Outpatient Physical Therapy    Treatment Note        Date: 2021  Patient: Tyler Currie  : 1940  ACCT #: [de-identified]  Referring Practitioner: Dr. Maria M Dominguez  Diagnosis: primary OA of Lt knee, s/p Lt TKR  Treatment Diagnosis: impaired mobility, imbalance, decreased knee ROM, LE weakness    Visit Information:  PT Visit Information  Onset Date: 21  PT Insurance Information: BCBS Medicare  Total # of Visits Approved: 1 (eval only)  Total # of Visits to Date: 15  Plan of Care/Certification Expiration Date: 21  No Show: 0  Canceled Appointment: 1  Progress Note Counter: 5/6 thru (21) per INS    Subjective: pain today is 3/10, ADL's are getting easier, car transfers are still alittle difficult     HEP Compliance:  [x] Good [] Fair [] Poor [] Reports not doing due to:    Vital Signs  Patient Currently in Pain: Yes   Pain Screening  Patient Currently in Pain: Yes  Pain Assessment  Pain Level: 3  Pain Type: Surgical pain  Pain Location: Knee  Pain Orientation: Left  Pain Descriptors: Sore    OBJECTIVE:   Exercises  Exercise 2: knee flex stretch 30 sec x 3, 8 in step  Exercise 3: Nu-Step, L-4, 5 min  Exercise 5: Mod Jason stretch 30 sec x 3 w/ strap  Exercise 8: step ups F/L x 15, 6 in step- cues for technique  Exercise 11: p-ball HS curls 30 sec x 5  Exercise 18: rocker board 3-way x 20, Lg  Exercise 19: BOSU lunges, Fwd, 3 sec x 15         Strength: [x] NT  [] MMT completed:      ROM: [x] NT  [] ROM measurements:      Manual:   Manual therapy  PROM: PROM, 8min ext,flexion w/ overpressure    Modalities:  Modalities  Other: Game ready, 10 min, pre 42.0 cm, post 41.7cm     *Indicates exercise, modality, or manual techniques to be initiated when appropriate    Assessment:         Body structures, Functions, Activity limitations: Decreased functional mobility , Decreased ROM, Decreased strength, Decreased endurance, Decreased balance, Decreased coordination, Increased pain  Assessment: added, knee flex streth on 8 in step, step ups w/ 6 in step, and BOSU lunges, vc's to avoid circumduction w/ step ups and tactile cues for sequencing, good overall tolerance to session, conclude w/ CP/int comp to reduce inflammation  Treatment Diagnosis: impaired mobility, imbalance, decreased knee ROM, LE weakness  Prognosis: Good       Goals:  Short term goals  Time Frame for Short term goals: 2 wks  Short term goal 1: Independent with HEP  Short term goal 2: Report 25% reduction in pain with activities and movement  Short term goal 3: Improve Lt knee ROM  to improve transfers    Long term goals  Time Frame for Long term goals : 6 wks  Long term goal 1: Improve Lt knee ROM 5-120 to improve gait quality and stairs  Long term goal 2: Improve Lt LE strength >/= 4+/5 to improve gait and balance  Long term goal 3: Ambulate 500 ft independently with safest AD with improved step length, step height, and knee flexion to advance  Long term goal 4: up/ down 4-6\" steps x3 with 1 HR independently  Long term goal 5: Archibald >/= 45/56 to demonstrate improved balance with decreased risk for falls  Progress toward goals:ongoing    POST-PAIN       Pain Rating (0-10 pain scale):   3/10   Location and pain description same as pre-treatment unless indicated. Action: [] NA   [x] Perform HEP  [] Meds as prescribed  [] Modalities as prescribed   [] Call Physician     Frequency/Duration:  Plan  Times per week: 2  Plan weeks: 4-5  Current Treatment Recommendations: Strengthening, Balance Training, Functional Mobility Training, ROM, Gait Training, Stair training, Neuromuscular Re-education, Home Exercise Program, Safety Education & Training, Patient/Caregiver Education & Training, Equipment Evaluation, Education, & procurement, Modalities, Manual Therapy - Soft Tissue Mobilization  Plan Comment: Transfer care of pt to [facility], [facility]     Pt to continue current HEP.   See objective section for any therapeutic exercise changes, additions or modifications this date.          PT Individual Minutes  Time In: 0800  Time Out: 0848  Minutes: 48  Timed Code Treatment Minutes: 38 Minutes  Procedure Minutes: CP/int comp 10 min     Timed Activity Minutes Units   Ther Ex 30 2   Manual  8 1       Signature:  Electronically signed by Andrews Calderon PTA on 8/13/21 at 8:38 AM EDT

## 2021-08-17 ENCOUNTER — HOSPITAL ENCOUNTER (OUTPATIENT)
Dept: PHYSICAL THERAPY | Age: 81
Setting detail: THERAPIES SERIES
Discharge: HOME | End: 2021-08-17
Payer: MEDICARE

## 2021-08-17 PROCEDURE — 97110 THERAPEUTIC EXERCISES: CPT

## 2021-08-17 ASSESSMENT — PAIN DESCRIPTION - ORIENTATION: ORIENTATION: LEFT

## 2021-08-17 ASSESSMENT — PAIN DESCRIPTION - LOCATION: LOCATION: KNEE

## 2021-08-17 ASSESSMENT — PAIN SCALES - GENERAL: PAINLEVEL_OUTOF10: 3

## 2021-08-17 NOTE — PROGRESS NOTES
[address]     Ph: [phone]  Fax: [phone]    [] Certification  [x] Recertification [x]  Plan of Care  [] Progress Note [] Discharge      To:  Dr. Lorna Elias      From:  Tay Amin, PT  Patient: Casey Taylor     : 1940  Diagnosis: primary OA of Lt knee, s/p Lt TKR     Date: 2021  Treatment Diagnosis: impaired mobility, imbalance, decreased knee ROM, LE weakness       Progress Report Period from:  2021  to 2021    Total # of Visits to Date: 14   No Show: 0    Canceled Appointment: 1     OBJECTIVE:   Short Term Goals - Time Frame for Short term goals: 2 wks    Goals Current/Discharge status  Met   Short term goal 1: Independent with HEP  indep with current; progressions ongoing [] yes  [x] no   Short term goal 2: Report 25% reduction in pain with activities and movement  25% reduction reported [x] yes  [] no   Short term goal 3: Improve Lt knee ROM  to improve transfers  AROM LLE (degrees)  LLE General AROM: L knee ext -5 deg, flexion 94 deg, supine- moderate to severe pain at end ROM   [] yes  [x] no       [] yes  [] no      []  yes  []  no     Long Term Goals - Time Frame for Long term goals : 6 wks  Goals Current/ Discharge status Met   Long term goal 1: Improve Lt knee ROM 5-120 to improve gait quality and stairs   AROM LLE (degrees)  LLE General AROM: L knee ext -5 deg, flexion 94 deg, supine- moderate to severe pain at end ROM      [] yes  [x] no   Long term goal 2: Improve Lt LE strength >/= 4+/5 to improve gait and balance    Strength LLE  L Hip Flexion: 4+/5  L Knee Flexion: 4+/5  L Knee Extension: 4+/5  L Ankle Dorsiflexion: 4+/5   [x] yes  [x] no   Long term goal 3: Ambulate 500 ft independently with safest AD with improved step length, step height, and knee flexion to advance antalgic pattern L LE [x] yes  [x] no   Long term goal 4: up/ down 4-6\" steps x3 with 1 HR independently continued difficulty with ascend with L LE and descend with L LE on step- continued non-reciprocal pattern [] yes  [x] no   Long term goal 5: Archibald >/= 50/56 to demonstrate improved balance with decreased risk for falls     Goal updated:  Archibald >/= 50/56 to demonstrate improved balance with decreased risk for falls Archibald 46/56 goal met this date [x] yes  [] no      Body structures, Functions, Activity limitations: Decreased functional mobility , Decreased ROM, Decreased strength, Decreased endurance, Decreased balance, Decreased coordination, Increased pain  Assessment: Today is 6/6 visits given by insurance. All goals assessed this date. Pt continues to exhibit compensated movement with sit to stand due to decreased L knee ROM, continued gait deviations, continued compensated method for stair negotiation, decreased L knee flex and ext AROM, continued pain with movement, and decreased L LE strength requiring continued PT to reach safe PLOF without deficits. Prognosis: Good  Discharge Recommendations: Continue to assess pending progress    PLAN: [x]   Frequency/Duration:  Plan  Times per week: 2  Plan weeks: 4-5 +4  Current Treatment Recommendations: Strengthening, Balance Training, Functional Mobility Training, ROM, Gait Training, Stair training, Neuromuscular Re-education, Home Exercise Program, Safety Education & Training, Patient/Caregiver Education & Training, Equipment Evaluation, Education, & procurement, Modalities, Manual Therapy - Soft Tissue Mobilization  Plan Comment: Transfer care of pt to [facility], PT; added 2xs/wk X 4 weeks to POC                       Patient Status:[x] Continue/ Initiate plan of Care    [] Discharge PT. Recommend pt continue with HEP.      [x] Additional visits requested, Please re-certify for additional visits:  8          Signature: Electronically signed by Jessica Quinteros PT on 8/17/21 at 12:37 PM EDT      If you have any questions or concerns, please don't hesitate to call. Thank you for your referral.    I have reviewed this plan of care and certify a need for medically necessary rehabilitation services.     Physician Signature:__________________________________________________________  Date:  Please sign and return

## 2021-08-17 NOTE — PROGRESS NOTES
[address] [address]  Outpatient Physical Therapy    Treatment Note        Date: 2021  Patient: Angella Delaney  : 1940  ACCT #: [de-identified]  Referring Practitioner: Dr. Maribell Mancera  Diagnosis: primary OA of Lt knee, s/p Lt TKR  Treatment Diagnosis: impaired mobility, imbalance, decreased knee ROM, LE weakness    Visit Information:  PT Visit Information  Onset Date: 21  PT Insurance Information: [employer] Medicare  Total # of Visits Approved: 1 (eval only)  Total # of Visits to Date: 14  No Show: 0  Canceled Appointment: 1  Progress Note Counter:  thru (32) per INS (Storm Persaud completed 9111 to ask insurance for additional visits)    Subjective: \"I am stiff today because I didn't stretch this morning\"     HEP Compliance:  [x] Good [] Cayden Cotto [] Poor [] Reports not doing due to:    Vital Signs  Patient Currently in Pain: Yes   Pain Screening  Patient Currently in Pain: Yes  Pain Assessment  Pain Level: 3  Pain Location: Knee  Pain Orientation: Left    OBJECTIVE:   Exercises  Exercise 1: QS with towel roll under heel 5 sec x 20  Exercise 2: knee flex stretch 30 sec x 3, 8 in step  Exercise 3: Nu-Step, L-4, 5 min  Exercise 5:  Mod Jason stretch 30 sec x 3 w/ strap  Exercise 8: step ups F/L x 15, 6 in step- cues for technique  Exercise 16: seated heel slide X 20  Exercise 17: LAQ 3 sec hold X 15  Exercise 18: rocker board 3-way x 20, Lg  Exercise 19: BOSU lungkirstie, Fwd, 3 sec x 15    Ambulation 1  Surface: carpet  Device: No Device  Assistance: Independent  Quality of Gait: antalgic pattern L LE  Comments: continued use of cane in community    Stairs  # Steps : 4  Stairs Height: 6\"  Rails: Left ascending  Device: No Device  Assistance: Supervision  Comment: continued difficulty with ascend with L LE and descend with L LE on step- continued non-reciprocal pattern    Transfers  Comment: continues with compensated method for sit to stand transfers due to continued knee flexion deficits L LE    Strength: [] NT  [x] MMT completed:     Strength LLE  L Hip Flexion: 4+/5  L Knee Flexion: 4+/5  L Knee Extension: 4+/5  L Ankle Dorsiflexion: 4+/5    ROM: [] NT  [x] ROM measurements:  AROM LLE (degrees)  LLE General AROM: L knee ext -5 deg, flexion 94 deg, supine- moderate to severe pain at end ROM     Manual:   Manual therapy  Other: objective measurements taken X 8 min    Modalities:  Modalities  Other: Game ready, 10 min, pre 42.0 cm, post 41.5cm   *Indicates exercise, modality, or manual techniques to be initiated when appropriate    Assessment: Body structures, Functions, Activity limitations: Decreased functional mobility , Decreased ROM, Decreased strength, Decreased endurance, Decreased balance, Decreased coordination, Increased pain  Assessment: Today is 6/6 visits given by insurance. All goals assessed this date. Pt continues to exhibit compensated movement with sit to stand due to decreased L knee ROM, continued gait deviations, continued compensated method for stair negotiation, decreased L knee flex and ext AROM, continued pain with movement, and decreased L LE strength requiring continued PT to reach safe PLOF without deficits.   Treatment Diagnosis: impaired mobility, imbalance, decreased knee ROM, LE weakness  Prognosis: Good     Goals:  Short term goals  Time Frame for Short term goals: 2 wks  Short term goal 1: Independent with HEP  Short term goal 2: Report 25% reduction in pain with activities and movement  Short term goal 3: Improve Lt knee ROM  to improve transfers    Long term goals  Time Frame for Long term goals : 6 wks  Long term goal 1: Improve Lt knee ROM 5-120 to improve gait quality and stairs  Long term goal 2: Improve Lt LE strength >/= 4+/5 to improve gait and balance  Long term goal 3: Ambulate 500 ft independently with safest AD with improved step length, step height, and knee flexion to advance  Long term goal 4: up/ down 4-6\" steps x3 with 1 HR independently  Long term goal 5: Archibald >/= 45/56 to demonstrate improved balance with decreased risk for falls  Progress toward goals: ongoing, progressing strength and ROM    POST-PAIN       Pain Rating (0-10 pain scale):   0/10   Location and pain description same as pre-treatment unless indicated. Action: [x] NA   [] Perform HEP  [] Meds as prescribed  [] Modalities as prescribed   [] Call Physician     Frequency/Duration:  Plan  Times per week: 2  Plan weeks: 4-5  Current Treatment Recommendations: Strengthening, Balance Training, Functional Mobility Training, ROM, Gait Training, Stair training, Neuromuscular Re-education, Home Exercise Program, Safety Education & Training, Patient/Caregiver Education & Training, Equipment Evaluation, Education, & procurement, Modalities, Manual Therapy - Soft Tissue Mobilization  Plan Comment: Transfer care of pt to [facility], PT     Pt to continue current HEP. See objective section for any therapeutic exercise changes, additions or modifications this date.     PT Individual Minutes  Time In: 0920  Time Out: 1010  Minutes: 50  Timed Code Treatment Minutes: 40 Minutes  Procedure Minutes: pneumatic compression X 10 min     Timed Activity Minutes Units   Ther Ex 40 3       Signature:  Electronically signed by Jessica Quinteros PT on 8/17/21 at 12:34 PM EDT

## 2021-09-02 ENCOUNTER — HOSPITAL ENCOUNTER (OUTPATIENT)
Dept: PHYSICAL THERAPY | Age: 81
Setting detail: THERAPIES SERIES
Discharge: HOME | End: 2021-09-02
Payer: MEDICARE

## 2021-09-02 PROCEDURE — 97110 THERAPEUTIC EXERCISES: CPT

## 2021-09-02 ASSESSMENT — PAIN SCALES - GENERAL: PAINLEVEL_OUTOF10: 1

## 2021-09-02 ASSESSMENT — PAIN DESCRIPTION - ORIENTATION: ORIENTATION: LEFT

## 2021-09-02 ASSESSMENT — PAIN DESCRIPTION - LOCATION: LOCATION: KNEE

## 2021-09-02 NOTE — PROGRESS NOTES
[id number] [address]  Outpatient Physical Therapy    Treatment Note        Date: 2021  Patient: Patricia Arboleda  : 1940  ACCT #: [de-identified]  Referring Practitioner: Dr. Jaren Gonzalez  Diagnosis: primary OA of Lt knee, s/p Lt TKR  Treatment Diagnosis: impaired mobility, imbalance, decreased knee ROM, LE weakness    Visit Information:  PT Visit Information  Onset Date: 21  PT Insurance Information: [employer]  Total # of Visits Approved: 1 (eval only)  Total # of Visits to Date: 15  No Show: 0  Canceled Appointment: 1  Progress Note Counter:  (6 additional visits approved by insurance  to 10/15    Subjective: 1/10 L knee     HEP Compliance:  [] Good [x] Fair [] Poor [] Reports not doing due to:    Vital Signs  Patient Currently in Pain: Yes   Pain Screening  Patient Currently in Pain: Yes  Pain Assessment  Pain Level: 1  Pain Location: Knee  Pain Orientation: Left    OBJECTIVE:   Exercises  Exercise 1: QS with towel roll under heel 5 sec x 20  Exercise 2: knee flex stretch in supine with strap 20 sec X 7 reps  Exercise 3: Nu-Step, L-4, 5 min  Exercise 4: prone hamstring curl X 10- difficulty tolerating prone  Exercise 5: Mod Jason stretch 30 sec x 3 w/ strap  Exercise 6: squats with UE support 2 X 10- cues for technique to avoid weight shift R  Exercise 8: step ups F/L x 15, 6 in step no UE support;  step up fwd 8 in step with unilat UE support  Exercise 9: TKR 5 sec x 15, YTB  Exercise 18: rocker board 3-way x 20, Lg  Exercise 19: BOSU lunges, Fwd, 3 sec x 15  Exercise 20: HEP: continue current    Strength: [x] NT  [] MMT completed:    ROM: [] NT  [x] ROM measurements:  AROM LLE (degrees)  LLE General AROM: L knee ext -5 deg, flexion 95 deg, supine- moderate pain at end ROM    Modalities:  Modalities  Other: Game ready, 10 min, pre 42.2 cm, post 42. 0cm   *Indicates exercise, modality, or manual techniques to be initiated when appropriate    Assessment:    Body structures, Functions, Activity limitations: Decreased functional mobility , Decreased ROM, Decreased strength, Decreased endurance, Decreased balance, Decreased coordination, Increased pain  Assessment: 6 additional visits approved by insurance 8/17-10/15- pt made aware. Pt reports went to MD yesterday and MD stated pt is doing well. Continued to progress strengthening L LE without pain. Pt continues to require increased cues for ex technique. Treatment Diagnosis: impaired mobility, imbalance, decreased knee ROM, LE weakness  Prognosis: Good       Goals:  Short term goals  Time Frame for Short term goals: 2 wks  Short term goal 1: Independent with HEP  Short term goal 2: Report 25% reduction in pain with activities and movement  Short term goal 3: Improve Lt knee ROM  to improve transfers    Long term goals  Time Frame for Long term goals : 6 wks  Long term goal 1: Improve Lt knee ROM 5-120 to improve gait quality and stairs  Long term goal 2: Improve Lt LE strength >/= 4+/5 to improve gait and balance  Long term goal 3: Ambulate 500 ft independently with safest AD with improved step length, step height, and knee flexion to advance  Long term goal 4: up/ down 4-6\" steps x3 with 1 HR independently  Long term goal 5: Archibald >/= 50/56 to demonstrate improved balance with decreased risk for falls  Progress toward goals: ongoing, progressing strength and ROM    POST-PAIN       Pain Rating (0-10 pain scale):  1 /10   Location and pain description same as pre-treatment unless indicated.    Action: [x] NA   [] Perform HEP  [] Meds as prescribed  [] Modalities as prescribed   [] Call Physician     Frequency/Duration:  Plan  Times per week: 2  Plan weeks: 4-5 +4  Current Treatment Recommendations: Strengthening, Balance Training, Functional Mobility Training, ROM, Gait Training, Stair training, Neuromuscular Re-education, Home Exercise Program, Safety Education & Training, Patient/Caregiver Education & Training, Equipment Evaluation, Education, & procurement, Modalities, Manual Therapy - Soft Tissue Mobilization  Plan Comment: Transfer care of pt to [facility]; added 2xs/wk X 4 weeks to POC     Pt to continue current HEP. See objective section for any therapeutic exercise changes, additions or modifications this date.     PT Individual Minutes  Time In: 0902  Time Out: 8915  Minutes: 48  Timed Code Treatment Minutes: 38 Minutes  Procedure Minutes: pneumatic compression X 10 min     Timed Activity Minutes Units   Ther Ex 38 3     Signature:  Electronically signed by Milena Brooks PT on 9/2/21 at 10:47 AM EDT

## 2021-09-10 ENCOUNTER — HOSPITAL ENCOUNTER (OUTPATIENT)
Dept: PHYSICAL THERAPY | Age: 81
Setting detail: THERAPIES SERIES
Discharge: HOME | End: 2021-09-10
Payer: MEDICARE

## 2021-09-10 PROCEDURE — 97110 THERAPEUTIC EXERCISES: CPT

## 2021-09-10 ASSESSMENT — PAIN SCALES - GENERAL: PAINLEVEL_OUTOF10: 3

## 2021-09-10 ASSESSMENT — PAIN DESCRIPTION - DESCRIPTORS: DESCRIPTORS: SORE

## 2021-09-10 ASSESSMENT — PAIN DESCRIPTION - ORIENTATION: ORIENTATION: LEFT

## 2021-09-10 ASSESSMENT — PAIN DESCRIPTION - PAIN TYPE: TYPE: SURGICAL PAIN

## 2021-09-10 ASSESSMENT — PAIN DESCRIPTION - LOCATION: LOCATION: KNEE

## 2021-09-10 NOTE — PROGRESS NOTES
[id number] [address]  Outpatient Physical Therapy    Treatment Note        Date: 9/10/2021  Patient: Tyler Currie  : 1940  ACCT #: [de-identified]  Referring Practitioner: Dr. Maria M Dominguez  Diagnosis: primary OA of Lt knee, s/p Lt TKR  Treatment Diagnosis: impaired mobility, imbalance, decreased knee ROM, LE weakness    Visit Information:  PT Visit Information  Onset Date: 21  PT Insurance Information: [employer] Medicare  Total # of Visits Approved: 1 (eval only)  Total # of Visits to Date: 16  No Show: 0  Canceled Appointment: 1  Progress Note Counter:  (6 additional visits approved by insurance  to 10/15    Subjective: pain is 3/10, I walked in the grocery store today w/o a cane     HEP Compliance:  [x] Good [] Fair [] Poor [] Reports not doing due to:    Vital Signs  Patient Currently in Pain: Yes   Pain Screening  Patient Currently in Pain: Yes  Pain Assessment  Pain Level: 3  Pain Type: Surgical pain  Pain Location: Knee  Pain Orientation: Left  Pain Descriptors: Sore    OBJECTIVE:   Exercises  Exercise 3: Nu-Step, L-5, 5 min  Exercise 4: HS curls x 15, YTB  Exercise 5: Mod Jason stretch 30 sec x 5 w/ strap  Exercise 6: squats with UE support 2 X 10  Exercise 10: stairs, 4 x 6in x 3, reciprocally w/ 2HR  Exercise 11: p-ball HS curls 10 sec x 10  Exercise 12: bridges x 15  Exercise 19: BOSU lunges, Fwd/lat 3 sec x 15       Strength: [x] NT  [] MMT completed:      ROM: [x] NT  [] ROM measurements:     Modalities:  Modalities  Cryotherapy (Minutes\Location): CP 10 min     *Indicates exercise, modality, or manual techniques to be initiated when appropriate    Assessment:         Body structures, Functions, Activity limitations: Decreased functional mobility , Decreased ROM, Decreased strength, Decreased endurance, Decreased balance, Decreased coordination, Increased pain  Assessment: improved gait pattern noted today, able to perform 6 in stairs, reciprocally w/ 2 HR, increased Nu-Step and reps w/ bridges, good tolerance to session, conclude w/ CP to reduce inflammation  Treatment Diagnosis: impaired mobility, imbalance, decreased knee ROM, LE weakness  Prognosis: Good       Goals:  Short term goals  Time Frame for Short term goals: 2 wks  Short term goal 1: Independent with HEP  Short term goal 2: Report 25% reduction in pain with activities and movement  Short term goal 3: Improve Lt knee ROM  to improve transfers    Long term goals  Time Frame for Long term goals : 6 wks  Long term goal 1: Improve Lt knee ROM 5-120 to improve gait quality and stairs  Long term goal 2: Improve Lt LE strength >/= 4+/5 to improve gait and balance  Long term goal 3: Ambulate 500 ft independently with safest AD with improved step length, step height, and knee flexion to advance  Long term goal 4: up/ down 4-6\" steps x3 with 1 HR independently  Long term goal 5: Archibald >/= 50/56 to demonstrate improved balance with decreased risk for falls  Progress toward goals:ongoing    POST-PAIN       Pain Rating (0-10 pain scale):   2/10   Location and pain description same as pre-treatment unless indicated. Action: [] NA   [x] Perform HEP  [] Meds as prescribed  [] Modalities as prescribed   [] Call Physician     Frequency/Duration:  Plan  Times per week: 2  Plan weeks: 4-5 +4  Current Treatment Recommendations: Strengthening, Balance Training, Functional Mobility Training, ROM, Gait Training, Stair training, Neuromuscular Re-education, Home Exercise Program, Safety Education & Training, Patient/Caregiver Education & Training, Equipment Evaluation, Education, & procurement, Modalities, Manual Therapy - Soft Tissue Mobilization  Plan Comment: Transfer care of pt to [facility], PT; added 2xs/wk X 4 weeks to POC     Pt to continue current HEP. See objective section for any therapeutic exercise changes, additions or modifications this date.          PT Individual Minutes  Time In: 2468  Time Out: 1423  Minutes: 48  Timed

## 2021-09-13 ENCOUNTER — HOSPITAL ENCOUNTER (OUTPATIENT)
Dept: PHYSICAL THERAPY | Age: 81
Setting detail: THERAPIES SERIES
Discharge: HOME | End: 2021-09-13
Payer: MEDICARE

## 2021-09-13 PROCEDURE — 97110 THERAPEUTIC EXERCISES: CPT

## 2021-09-13 PROCEDURE — 97140 MANUAL THERAPY 1/> REGIONS: CPT

## 2021-09-13 NOTE — PROGRESS NOTES
[address] [address]  Outpatient Physical Therapy    Treatment Note        Date: 2021  Patient: Chavo Tatum  : 1940  ACCT #: [de-identified]  Referring Practitioner: Dr. Jericho Hernandez  Diagnosis: primary OA of Lt knee, s/p Lt TKR  Treatment Diagnosis: impaired mobility, imbalance, decreased knee ROM, LE weakness    Visit Information:  PT Visit Information  Onset Date: 21  PT Insurance Information: [employer] Medicare  Total # of Visits Approved: 1 (eval only)  Total # of Visits to Date:   No Show: 0  Canceled Appointment: 1  Progress Note Counter: 3/6 (6 additional visits approved by insurance  to 10/15    Subjective: no pain currently, I get stiff if I sit too long, I have been trying to walk more and more     HEP Compliance:  [x] Good [] Fair [] Poor [] Reports not doing due to:    Vital Signs  Patient Currently in Pain: Denies   Pain Screening  Patient Currently in Pain: Denies    OBJECTIVE:   Exercises  Exercise 3: Nu-Step, L-5, 5 min  Exercise 10: 1 flight of stairs NR down/up  Exercise 11: p-ball HS curls 10 sec x 10  Exercise 13: step ups F/L x 15, w/ 6 in step  Exercise 18: rocker board 3-way x 20, Lg     Strength: [x] NT  [] MMT completed:      ROM: [x] NT  [] ROM measurements:   Manual:   Manual therapy  PROM: PROM, 10 min ext,flexion w/ overpressure    Modalities:  Modalities  Cryotherapy (Minutes\Location): CP 10 min     *Indicates exercise, modality, or manual techniques to be initiated when appropriate    Assessment:         Body structures, Functions, Activity limitations: Decreased functional mobility , Decreased ROM, Decreased strength, Decreased endurance, Decreased balance, Decreased coordination, Increased pain  Assessment: performed 1 flight of stairs, down/up, non-reciprocally w/ 1HR w/ some deviations, step ups F/L on 6 in step w/ improved technique, good tolerance to session, gait pattern continues to improve, conclude w/ CP to reduce mm soreness  Treatment Diagnosis: impaired mobility, imbalance, decreased knee ROM, LE weakness  Prognosis: Good       Goals:  Short term goals  Time Frame for Short term goals: 2 wks  Short term goal 1: Independent with HEP  Short term goal 2: Report 25% reduction in pain with activities and movement  Short term goal 3: Improve Lt knee ROM  to improve transfers    Long term goals  Time Frame for Long term goals : 6 wks  Long term goal 1: Improve Lt knee ROM 5-120 to improve gait quality and stairs  Long term goal 2: Improve Lt LE strength >/= 4+/5 to improve gait and balance  Long term goal 3: Ambulate 500 ft independently with safest AD with improved step length, step height, and knee flexion to advance  Long term goal 4: up/ down 4-6\" steps x3 with 1 HR independently  Long term goal 5: Archibald >/= 50/56 to demonstrate improved balance with decreased risk for falls  Progress toward goals:ongoing    POST-PAIN       Pain Rating (0-10 pain scale):   0/10   Location and pain description same as pre-treatment unless indicated. Action: [x] NA   [] Perform HEP  [] Meds as prescribed  [] Modalities as prescribed   [] Call Physician     Frequency/Duration:  Plan  Times per week: 2  Plan weeks: 4-5 +4  Current Treatment Recommendations: Strengthening, Balance Training, Functional Mobility Training, ROM, Gait Training, Stair training, Neuromuscular Re-education, Home Exercise Program, Safety Education & Training, Patient/Caregiver Education & Training, Equipment Evaluation, Education, & procurement, Modalities, Manual Therapy - Soft Tissue Mobilization  Plan Comment: Transfer care of pt to [facility], PT; added 2xs/wk X 4 weeks to POC     Pt to continue current HEP. See objective section for any therapeutic exercise changes, additions or modifications this date.          PT Individual Minutes  Time In: 0920  Time Out: 1008  Minutes: 48  Timed Code Treatment Minutes: 38 Minutes  Procedure Minutes: CP 10 min     Timed Activity Minutes Units Ther Ex 28 2   Manual  10 1       Signature:  Electronically signed by Heriberto Clayton PTA on 9/13/21 at 9:56 AM EDT

## 2021-09-16 ENCOUNTER — HOSPITAL ENCOUNTER (OUTPATIENT)
Dept: PHYSICAL THERAPY | Age: 81
Setting detail: THERAPIES SERIES
Discharge: HOME | End: 2021-09-16
Payer: MEDICARE

## 2021-09-16 PROCEDURE — 97110 THERAPEUTIC EXERCISES: CPT

## 2021-09-16 PROCEDURE — 97140 MANUAL THERAPY 1/> REGIONS: CPT

## 2021-09-16 ASSESSMENT — PAIN DESCRIPTION - LOCATION: LOCATION: KNEE

## 2021-09-16 ASSESSMENT — PAIN DESCRIPTION - ORIENTATION: ORIENTATION: LEFT

## 2021-09-16 ASSESSMENT — PAIN DESCRIPTION - DESCRIPTORS: DESCRIPTORS: SORE

## 2021-09-16 ASSESSMENT — PAIN SCALES - GENERAL: PAINLEVEL_OUTOF10: 2

## 2021-09-16 ASSESSMENT — PAIN DESCRIPTION - PAIN TYPE: TYPE: SURGICAL PAIN

## 2021-09-16 NOTE — PROGRESS NOTES
[id number] [address]  Outpatient Physical Therapy    Treatment Note        Date: 2021  Patient: Naa Anderson  : 1940  ACCT #: [de-identified]  Referring Practitioner: Dr. Siddhartha Valencia  Diagnosis: primary OA of Lt knee, s/p Lt TKR  Treatment Diagnosis: impaired mobility, imbalance, decreased knee ROM, LE weakness    Visit Information:  PT Visit Information  Onset Date: 21  PT Insurance Information: [employer] Medicare  Total # of Visits Approved: 1 (eval only)  Total # of Visits to Date: 18  No Show: 0  Canceled Appointment: 1  Progress Note Counter:  (6 additional visits approved by insurance  to 10/15) (on [place]  assessment for d/c or ask insurance for continued visits)    Subjective: some pain today, I've been trying to do more more     HEP Compliance:  [x] Good [] Fair [] Poor [] Reports not doing due to:    Vital Signs  Patient Currently in Pain: Yes   Pain Screening  Patient Currently in Pain: Yes  Pain Assessment  Pain Level: 2  Pain Type: Surgical pain  Pain Location: Knee  Pain Orientation: Left  Pain Descriptors: Sore    OBJECTIVE:   Exercises  Exercise 1: sit to stand x 10 w/ good eccentric control  Exercise 3: Nu-Step, L-6, 5 min  Exercise 6: squats with UE support 3 sec hold X 10  Exercise 8: step ups F/L x 10 w/ 8 in step  Exercise 19: BOSU lunges Fwd,  3 sec x 15         Strength: [x] NT  [] MMT completed:         ROM: [] NT  [x] ROM measurements:        PROM LLE (degrees)  LLE General PROM: knee flexion 105 deg, seated  AROM LLE (degrees)  LLE General AROM: L knee ext -6 deg, flexion 96 deg, supine         Manual:   Manual therapy  PROM: PROM, 10 min ext,flexion w/ overpressure    Modalities:  Modalities  Cryotherapy (Minutes\Location): CP 10 min     *Indicates exercise, modality, or manual techniques to be initiated when appropriate    Assessment:         Body structures, Functions, Activity limitations: Decreased functional mobility , Decreased ROM, Decreased strength, Decreased endurance, Decreased balance, Decreased coordination, Increased pain  Assessment: increased Nu-Step to L-6, step ups to 8 in, added, LARISSA rascon Fwd, improved tolerance to all exercises, conclude w/ CP to reduce inflammation  Treatment Diagnosis: impaired mobility, imbalance, decreased knee ROM, LE weakness  Prognosis: Good       Goals:  Short term goals  Time Frame for Short term goals: 2 wks  Short term goal 1: Independent with HEP  Short term goal 2: Report 25% reduction in pain with activities and movement  Short term goal 3: Improve Lt knee ROM  to improve transfers    Long term goals  Time Frame for Long term goals : 6 wks  Long term goal 1: Improve Lt knee ROM 5-120 to improve gait quality and stairs  Long term goal 2: Improve Lt LE strength >/= 4+/5 to improve gait and balance  Long term goal 3: Ambulate 500 ft independently with safest AD with improved step length, step height, and knee flexion to advance  Long term goal 4: up/ down 4-6\" steps x3 with 1 HR independently  Long term goal 5: Archibald >/= 50/56 to demonstrate improved balance with decreased risk for falls  Progress toward goals:improved PROM as noted    POST-PAIN       Pain Rating (0-10 pain scale):   0/10   Location and pain description same as pre-treatment unless indicated.    Action: [x] NA   [] Perform HEP  [] Meds as prescribed  [] Modalities as prescribed   [] Call Physician     Frequency/Duration:  Plan  Times per week: 2  Plan weeks: 4-5 +4  Current Treatment Recommendations: Strengthening, Balance Training, Functional Mobility Training, ROM, Gait Training, Stair training, Neuromuscular Re-education, Home Exercise Program, Safety Education & Training, Patient/Caregiver Education & Training, Equipment Evaluation, Education, & procurement, Modalities, Manual Therapy - Soft Tissue Mobilization  Plan Comment: Transfer care of pt to [facility]; on NV 9/20 assessment for d/c or ask insurance for continued visits if indicated     Pt to continue current HEP. See objective section for any therapeutic exercise changes, additions or modifications this date.          PT Individual Minutes  Time In: 1207  Time Out: 1212  Minutes: 48  Timed Code Treatment Minutes: 38 Minutes  Procedure Minutes:CP 10 min     Timed Activity Minutes Units   Ther Ex 28 2   Manual  10 1       Signature:  Electronically signed by Princess Pedro PTA on 9/16/21 at 9:56 AM EDT  Electronically signed by Darinel Isidro PT on 9/16/2021 at 11:28 AM

## 2021-09-20 ENCOUNTER — HOSPITAL ENCOUNTER (OUTPATIENT)
Dept: PHYSICAL THERAPY | Age: 81
Setting detail: THERAPIES SERIES
Discharge: HOME | End: 2021-09-20
Payer: MEDICARE

## 2021-09-20 PROCEDURE — 97110 THERAPEUTIC EXERCISES: CPT

## 2021-09-20 ASSESSMENT — PAIN DESCRIPTION - DESCRIPTORS: DESCRIPTORS: SORE

## 2021-09-20 ASSESSMENT — ENCOUNTER SYMPTOMS
SHORTNESS OF BREATH: 0
CONSTIPATION: 1
COUGH: 0

## 2021-09-20 ASSESSMENT — PAIN DESCRIPTION - ORIENTATION: ORIENTATION: LEFT

## 2021-09-20 ASSESSMENT — PAIN DESCRIPTION - LOCATION: LOCATION: KNEE

## 2021-09-20 ASSESSMENT — PAIN DESCRIPTION - PAIN TYPE: TYPE: SURGICAL PAIN

## 2021-09-20 ASSESSMENT — PAIN SCALES - GENERAL: PAINLEVEL_OUTOF10: 2

## 2021-09-20 NOTE — PROGRESS NOTES
[id number] [address]  Outpatient Physical Therapy    Treatment Note        Date: 2021  Patient: Naa Anderson  : 1940  ACCT #: [de-identified]  Referring Practitioner: Dr. Siddhartha Valencia  Diagnosis: primary OA of Lt knee, s/p Lt TKR  Treatment Diagnosis: impaired mobility, imbalance, decreased knee ROM, LE weakness    Visit Information:  PT Visit Information  Onset Date: 21  PT Insurance Information: [employer] Medicare  Total # of Visits Approved: 1 (eval only)  Total # of Visits to Date:   No Show: 0  Canceled Appointment: 1  Progress Note Counter:     Subjective: mild pain today, I've been trying to do more more     HEP Compliance:  [x] Good [] Fair [] Poor [] Reports not doing due to:    Vital Signs  Patient Currently in Pain: Yes   Pain Screening  Patient Currently in Pain: Yes  Pain Assessment  Pain Level: 2  Pain Type: Surgical pain  Pain Location: Knee  Pain Orientation: Left  Pain Descriptors: Sore    OBJECTIVE:   Exercises  Exercise 1: sit to stand x 10 w/ good eccentric control  Exercise 3: Nu-Step, L-6, 5 min  Exercise 5: hip series x 15  Exercise 7: SLR x 20  Exercise 8: step ups F/L x 15 w/ 8 in step  Exercise 12: bridges x 15  Exercise 20: obj measures, LEFS and MAR, 10 min         Strength: [] NT  [x] MMT completed:     Strength LLE  Comment: S/L abd 4+/5  L Hip Flexion: 4+/5  L Knee Flexion: 4+/5  L Knee Extension: 5/5  L Ankle Dorsiflexion: 4+/5     ROM: [] NT  [x] ROM measurements:      AROM LLE (degrees)  LLE General AROM: L knee ext -7 deg, flexion 96 deg, supine        Modalities:  Modalities  Cryotherapy (Minutes\Location): CP 10 min     *Indicates exercise, modality, or manual techniques to be initiated when appropriate    Assessment:         Body structures, Functions, Activity limitations: Decreased functional mobility , Decreased ROM, Decreased strength, Decreased endurance, Decreased balance, Decreased coordination, Increased pain  Assessment: continued w/ Minutes  Time In: 0920  Time Out: 1008  Minutes: 48  Timed Code Treatment Minutes: 38 Minutes  Procedure Minutes: CP 10 min     Timed Activity Minutes Units   Ther Ex 38 3       Signature:  Electronically signed by Yoselyn Brown PTA on 9/20/21 at 11:03 AM EDT

## 2021-09-20 NOTE — PROGRESS NOTES
[address]     Ph: [phone]  Fax: [phone]    [] Certification  [] Recertification []  Plan of Care  [] Progress Note [x] Discharge      To:  Dr. Francheska Nath      From:  Brandie Cervantes, PT  Patient: Alice Gavin     : 1940  Diagnosis: primary OA of Lt knee, s/p Lt TKR     Date: 2021  Treatment Diagnosis: impaired mobility, imbalance, decreased knee ROM, LE weakness       Progress Report Period from:  2021  to 2021    Total # of Visits to Date: 19   No Show: 0    Canceled Appointment: 1     OBJECTIVE:   Short Term Goals - Time Frame for Short term goals: 2 wks    Goals Current/Discharge status  Met   Short term goal 1: Independent with HEP  Indep w/ HEP [x] yes  [] no   Short term goal 2: Report 25% reduction in pain with activities and movement  Pain ranges from 0-2/10 [x] yes  [] no   Short term goal 3: Improve Lt knee ROM  to improve transfers  AROM LLE (degrees)  LLE General AROM: L knee ext -7 deg, flexion 96 deg, supine   [] yes  [x] no     Long Term Goals - Time Frame for Long term goals : 6 wks  Goals Current/ Discharge status Met   Long term goal 1: Improve Lt knee ROM 5-120 to improve gait quality and stairs AROM LLE (degrees)  LLE General AROM: L knee ext -7 deg, flexion 96 deg, supine   [] yes  [x] no   Long term goal 2: Improve Lt LE strength >/= 4+/5 to improve gait and balance    Strength LLE  Comment: S/L abd 4+/5  L Hip Flexion: 4+/5  L Knee Flexion: 4+/5  L Knee Extension: 5/5  L Ankle Dorsiflexion: 4+/5   [x] yes  [] no   Long term goal 3: Ambulate 500 ft independently with safest AD with improved step length, step height, and knee flexion to advance Able to ambulate > 500 ft w/o AD [x] yes  [] no   Long term goal 4: up/ down 4-6\" steps x3 with 1 HR independently Able to perform 6 in steps reciprocally [x] yes  [] no   Long term goal 5: Archibald >/= 50/56 to demonstrate improved balance with decreased risk for falls MAR=47/56 [] yes  [x] no        Body structures, Functions, Activity limitations: Decreased functional mobility , Decreased ROM, Decreased strength, Decreased endurance, Decreased balance, Decreased coordination, Increased pain  Assessment: continued w/ current exercises, increased reps w/ step ups w/ good carryover, no c/o pain during session only some tightness, but says all ADL's are gettting easier, conclude w/ CP to reduce inflammation. Pt d/c this date due to highest functional level reached and no continued visits approved by insurance. Pt continues to exhibit ROM and balance deficits. Pt made improvements in improved ability to amb and negotiate steps with decreased pain. Pt is in agreement with d/c. Electronically signed by Dylan Ingram PT on 9/21/2021 at 10:38 AM  Prognosis: Good  Discharge Recommendations: Continue to assess pending progress    PLAN:   Frequency/Duration:  Plan  Times per week: 2  Plan weeks: 4-5 +4  Current Treatment Recommendations: Strengthening, Balance Training, Functional Mobility Training, ROM, Gait Training, Stair training, Neuromuscular Re-education, Home Exercise Program, Safety Education & Training, Patient/Caregiver Education & Training, Equipment Evaluation, Education, & procurement, Modalities, Manual Therapy - Soft Tissue Mobilization  Plan Comment: D/C per POC                   Patient Status:[] Continue/ Initiate plan of Care    [x] Discharge PT. Recommend pt continue with HEP. [] Additional visits requested, Please re-certify for additional visits:          Signature: objective info byElectronically signed by Minal Hurt PTA on 9/20/21 at 11:07 AM EDT  Electronically signed by Dylan Ingram PT on 9/21/2021 at 10:38 AM    If you have any questions or concerns, please don't hesitate to call.   Thank you for your referral.    I have reviewed this plan of care and certify a need for medically necessary rehabilitation services.     Physician Signature:__________________________________________________________  Date:  Please sign and return

## 2021-09-20 NOTE — PROGRESS NOTES
[facility], [address]     Ph: [phone]  Fax: [phone]    [] Certification  [] Recertification []  Plan of Care  [] Progress Note [x] Discharge      To:  Dr. Jamila Juárez      From:  Mary Garzon, PT  Patient: Darshana Ornelas     : 1940  Diagnosis: primary OA of Lt knee, s/p Lt TKR     Date: 2021  Treatment Diagnosis: impaired mobility, imbalance, decreased knee ROM, LE weakness       Progress Report Period from:  2021  to 2021    Total # of Visits to Date: 19   No Show: 0    Canceled Appointment: 1     OBJECTIVE:   Short Term Goals - Time Frame for Short term goals: 2 wks    Goals Current/Discharge status  Met   Short term goal 1: Independent with HEP   [] yes  [] no   Short term goal 2: Report 25% reduction in pain with activities and movement   [] yes  [] no   Short term goal 3: Improve Lt knee ROM  to improve transfers   [] yes  [] no       [] yes  [] no      []  yes  []  no     Long Term Goals - Time Frame for Long term goals : 6 wks  Goals Current/ Discharge status Met   Long term goal 1: Improve Lt knee ROM 5-120 to improve gait quality and stairs  [] yes  [] no   Long term goal 2: Improve Lt LE strength >/= 4+/5 to improve gait and balance  [] yes  [] no   Long term goal 3: Ambulate 500 ft independently with safest AD with improved step length, step height, and knee flexion to advance  [] yes  [] no   Long term goal 4: up/ down 4-6\" steps x3 with 1 HR independently  [] yes  [] no   Long term goal 5: Archibald >/= 50/56 to demonstrate improved balance with decreased risk for falls  [] yes  [] no       [] yes  [] no       [] yes  [] no        Body structures, Functions, Activity limitations: Decreased functional mobility , Decreased ROM, Decreased strength, Decreased endurance, Decreased balance, Decreased coordination, Increased pain  Assessment: continued w/ current exercises, increased reps w/ step ups w/ good carryover, no c/o pain during session only some tightness, but says all ADL's are gettting easier, conclude w/ CP to reduce inflammation  Prognosis: Good  Discharge Recommendations: Continue to assess pending progress           PLAN: [] Evaluate and Treat  Frequency/Duration:  Plan  Times per week: 2  Plan weeks: 4-5 +4  Current Treatment Recommendations: Strengthening, Balance Training, Functional Mobility Training, ROM, Gait Training, Stair training, Neuromuscular Re-education, Home Exercise Program, Safety Education & Training, Patient/Caregiver Education & Training, Equipment Evaluation, Education, & procurement, Modalities, Manual Therapy - Soft Tissue Mobilization  Plan Comment: D/C per POC     Precautions:                            Patient Status:[] Continue/ Initiate plan of Care    [] Discharge PT. Recommend pt continue with HEP. [] Additional visits requested, Please re-certify for additional visits:          Signature: Electronically signed by Heriberto Clayton PTA on 9/20/21 at 11:04 AM EDT      If you have any questions or concerns, please don't hesitate to call. Thank you for your referral.    I have reviewed this plan of care and certify a need for medically necessary rehabilitation services.     Physician Signature:__________________________________________________________  Date:  Please sign and return

## 2021-09-23 ENCOUNTER — APPOINTMENT (OUTPATIENT)
Dept: PHYSICAL THERAPY | Age: 81
End: 2021-09-23
Payer: MEDICARE

## 2021-09-29 ENCOUNTER — OFFICE VISIT (OUTPATIENT)
Dept: FAMILY MEDICINE CLINIC | Age: 81
End: 2021-09-29
Payer: MEDICARE

## 2021-09-29 VITALS
HEART RATE: 85 BPM | SYSTOLIC BLOOD PRESSURE: 124 MMHG | OXYGEN SATURATION: 98 % | TEMPERATURE: 98 F | HEIGHT: 66 IN | BODY MASS INDEX: 26.58 KG/M2 | WEIGHT: 165.4 LBS | DIASTOLIC BLOOD PRESSURE: 68 MMHG

## 2021-09-29 DIAGNOSIS — I10 ESSENTIAL HYPERTENSION: Primary | ICD-10-CM

## 2021-09-29 DIAGNOSIS — E78.2 MIXED HYPERLIPIDEMIA: ICD-10-CM

## 2021-09-29 DIAGNOSIS — E03.4 HYPOTHYROIDISM DUE TO ACQUIRED ATROPHY OF THYROID: ICD-10-CM

## 2021-09-29 DIAGNOSIS — Z23 NEED FOR VACCINATION: ICD-10-CM

## 2021-09-29 DIAGNOSIS — E55.9 HYPOVITAMINOSIS D: ICD-10-CM

## 2021-09-29 PROCEDURE — 90694 VACC AIIV4 NO PRSRV 0.5ML IM: CPT | Performed by: NURSE PRACTITIONER

## 2021-09-29 PROCEDURE — G0008 ADMIN INFLUENZA VIRUS VAC: HCPCS | Performed by: NURSE PRACTITIONER

## 2021-09-29 PROCEDURE — 99214 OFFICE O/P EST MOD 30 MIN: CPT | Performed by: NURSE PRACTITIONER

## 2021-09-29 ASSESSMENT — ENCOUNTER SYMPTOMS
ORTHOPNEA: 0
DIARRHEA: 0
SHORTNESS OF BREATH: 0
WHEEZING: 0
NAUSEA: 0
ABDOMINAL PAIN: 0
CONSTIPATION: 0
COUGH: 0
BLURRED VISION: 0
COLOR CHANGE: 0

## 2021-09-29 NOTE — PATIENT INSTRUCTIONS
Patient Education        DASH Diet: Care Instructions  Your Care Instructions     The DASH diet is an eating plan that can help lower your blood pressure. DASH stands for Dietary Approaches to Stop Hypertension. Hypertension is high blood pressure. The DASH diet focuses on eating foods that are high in calcium, potassium, and magnesium. These nutrients can lower blood pressure. The foods that are highest in these nutrients are fruits, vegetables, low-fat dairy products, nuts, seeds, and legumes. But taking calcium, potassium, and magnesium supplements instead of eating foods that are high in those nutrients does not have the same effect. The DASH diet also includes whole grains, fish, and poultry. The DASH diet is one of several lifestyle changes your doctor may recommend to lower your high blood pressure. Your doctor may also want you to decrease the amount of sodium in your diet. Lowering sodium while following the DASH diet can lower blood pressure even further than just the DASH diet alone. Follow-up care is a key part of your treatment and safety. Be sure to make and go to all appointments, and call your doctor if you are having problems. It's also a good idea to know your test results and keep a list of the medicines you take. How can you care for yourself at home? Following the DASH diet  · Eat 4 to 5 servings of fruit each day. A serving is 1 medium-sized piece of fruit, ½ cup chopped or canned fruit, 1/4 cup dried fruit, or 4 ounces (½ cup) of fruit juice. Choose fruit more often than fruit juice. · Eat 4 to 5 servings of vegetables each day. A serving is 1 cup of lettuce or raw leafy vegetables, ½ cup of chopped or cooked vegetables, or 4 ounces (½ cup) of vegetable juice. Choose vegetables more often than vegetable juice. · Get 2 to 3 servings of low-fat and fat-free dairy each day. A serving is 8 ounces of milk, 1 cup of yogurt, or 1 ½ ounces of cheese. · Eat 6 to 8 servings of grains each day. A serving is 1 slice of bread, 1 ounce of dry cereal, or ½ cup of cooked rice, pasta, or cooked cereal. Try to choose whole-grain products as much as possible. · Limit lean meat, poultry, and fish to 2 servings each day. A serving is 3 ounces, about the size of a deck of cards. · Eat 4 to 5 servings of nuts, seeds, and legumes (cooked dried beans, lentils, and split peas) each week. A serving is 1/3 cup of nuts, 2 tablespoons of seeds, or ½ cup of cooked beans or peas. · Limit fats and oils to 2 to 3 servings each day. A serving is 1 teaspoon of vegetable oil or 2 tablespoons of salad dressing. · Limit sweets and added sugars to 5 servings or less a week. A serving is 1 tablespoon jelly or jam, ½ cup sorbet, or 1 cup of lemonade. · Eat less than 2,300 milligrams (mg) of sodium a day. If you limit your sodium to 1,500 mg a day, you can lower your blood pressure even more. · Be aware that all of these are the suggested number of servings for people who eat 1,800 to 2,000 calories a day. Your recommended number of servings may be different if you need more or fewer calories. Tips for success  · Start small. Do not try to make dramatic changes to your diet all at once. You might feel that you are missing out on your favorite foods and then be more likely to not follow the plan. Make small changes, and stick with them. Once those changes become habit, add a few more changes. · Try some of the following:  ? Make it a goal to eat a fruit or vegetable at every meal and at snacks. This will make it easy to get the recommended amount of fruits and vegetables each day. ? Try yogurt topped with fruit and nuts for a snack or healthy dessert. ? Add lettuce, tomato, cucumber, and onion to sandwiches. ? Combine a ready-made pizza crust with low-fat mozzarella cheese and lots of vegetable toppings. Try using tomatoes, squash, spinach, broccoli, carrots, cauliflower, and onions. ?  Have a variety of cut-up vegetables with a low-fat dip as an appetizer instead of chips and dip. ? Sprinkle sunflower seeds or chopped almonds over salads. Or try adding chopped walnuts or almonds to cooked vegetables. ? Try some vegetarian meals using beans and peas. Add garbanzo or kidney beans to salads. Make burritos and tacos with mashed flores beans or black beans. Where can you learn more? Go to [email or web address] org and sign in to your [employer] account. Enter U149 in the [employer] box to learn more about \"DASH Diet: Care Instructions. \"     If you do not have an account, please click on the \"Sign Up Now\" link. Current as of: April 29, 2021               Content Version: 13.0  © 8629-7085 Healthwise, Incorporated. Care instructions adapted under license by [facility] ([facility]). If you have questions about a medical condition or this instruction, always ask your healthcare professional. [address] any warranty or liability for your use of this information.

## 2021-09-29 NOTE — PROGRESS NOTES
Subjective:      Patient ID: Herman Shields is a 78 y.o. female who presents today for:     Chief Complaint   Patient presents with   [address]     Patient presents today to establish care. Former PCP Dr. Thee Ayoub. Hypertension  This is a chronic problem. The current episode started more than 1 year ago. The problem is unchanged. Pertinent negatives include no anxiety, blurred vision, chest pain, headaches, malaise/fatigue, neck pain, orthopnea, palpitations, peripheral edema, PND, shortness of breath or sweats. Past treatments include ACE inhibitors. The current treatment provides moderate improvement. Hypothyroidism  Patient presents for evaluation of thyroid function. Symptoms consist of denies fatigue, weight changes, heat/cold intolerance, bowel/skin changes or CVS symptoms. Symptoms have present for several years. The symptoms are no. The problem has been stable. Previous thyroid studies include TSH. The hypothyroidism is due to hypothyroidism. Pt reports taking medication for cholesterol with no ill side effects. Denies any cp, palpitations, sob, or dizziness. Reports she did forget to take vitamin d sometimes, but plans to start taking it again. Denies any other concerns or questions.        Past Medical History:   Diagnosis Date    Allergic rhinitis     Carotid artery occlusion     Carpal tunnel syndrome, bilateral     Colitis, ischemic (HCC)     DJD (degenerative joint disease)     H/O: psoriasis     History of bad fall 2009    MULTIPLE CONTUSIONS    History of diverticulitis of colon     History of kidney stones     History of mammography, screening 2010    CAT 2    History of osteopenia     History of shingles     Hyperlipidemia     Hypertension     Hypothyroidism     Renal failure     MILD     Past Surgical History:   Procedure Laterality Date    BREAST SURGERY  80'S    R BREAST LUMPECTOMY    COLONOSCOPY  01/01/2010    SIGMOID DIVERTICULOSIS    Outpatient Medications on File Prior to Visit   Medication Sig Dispense Refill    lisinopril (PRINIVIL;ZESTRIL) 10 MG tablet TAKE 1 TABLET BY MOUTH DAILY 90 tablet 2    aspirin 81 MG EC tablet Take 81 mg by mouth 2 times daily      calcium carbonate-vitamin D (CALTRATE) 600-400 MG-UNIT TABS per tab Take 1 tablet by mouth daily      alendronate (FOSAMAX) 70 MG tablet Take 1 tablet by mouth every 7 days 12 tablet 1    lovastatin (MEVACOR) 40 MG tablet TAKE 1 TABLET BY MOUTH EVERY DAY AT NIGHT 90 tablet 3    levothyroxine (SYNTHROID) 75 MCG tablet TAKE 1 TABLET BY MOUTH EVERY DAY 90 tablet 3     No current facility-administered medications on file prior to visit. Allergies:  Seasonal    Review of Systems   Constitutional: Negative for chills, fatigue, fever and malaise/fatigue. HENT: Negative for congestion, ear discharge and ear pain. Eyes: Negative for blurred vision. Respiratory: Negative for cough, shortness of breath and wheezing. Cardiovascular: Negative for chest pain, palpitations, orthopnea, leg swelling and PND. Gastrointestinal: Negative for abdominal pain, constipation, diarrhea and nausea. Genitourinary: Negative for difficulty urinating. Musculoskeletal: Negative for neck pain. Skin: Negative for color change. Neurological: Negative for dizziness and headaches. Objective:   /68 (Site: Right Upper Arm, Position: Sitting, Cuff Size: Medium Adult)   Pulse 85   Temp 98 °F (36.7 °C) (Temporal)   Ht 5' 6\" (1.676 m)   Wt 165 lb 6.4 oz (75 kg)   SpO2 98%   BMI 26.70 kg/m²     Physical Exam  Constitutional:       Appearance: She is well-developed. HENT:      Head: Normocephalic. Right Ear: Tympanic membrane, ear canal and external ear normal.      Left Ear: Tympanic membrane, ear canal and external ear normal.      Nose: Nose normal.      Mouth/Throat:      Mouth: Mucous membranes are moist.      Pharynx: Oropharynx is clear. Uvula midline.    Eyes: General:         Right eye: No discharge. Left eye: No discharge. Conjunctiva/sclera: Conjunctivae normal.   Cardiovascular:      Rate and Rhythm: Normal rate and regular rhythm. Heart sounds: Normal heart sounds. Pulmonary:      Effort: Pulmonary effort is normal. No respiratory distress. Breath sounds: Normal breath sounds. Musculoskeletal:      Cervical back: Normal range of motion. Right lower le+ Edema present. Left lower le+ Edema present. Lymphadenopathy:      Cervical: No cervical adenopathy. Skin:     General: Skin is warm and dry. Neurological:      Mental Status: She is alert and oriented to person, place, and time. Psychiatric:         Mood and Affect: Mood normal.         Behavior: Behavior normal.         Assessment:          Diagnosis Orders   1. Essential hypertension  CBC Auto Differential    Comprehensive Metabolic Panel   2. Need for vaccination  INFLUENZA, QUADV, ADJUVANTED, 72 YRS =, IM, PF, PREFILL SYR, 0.5ML (FLUAD)   3. Hypothyroidism due to acquired atrophy of thyroid  CBC Auto Differential    Comprehensive Metabolic Panel   4. Hypovitaminosis D     5. Mixed hyperlipidemia         Plan:      Orders Placed This Encounter   Procedures    INFLUENZA, QUADV, ADJUVANTED, 72 YRS =, IM, PF, PREFILL SYR, 0.5ML (FLUAD)    CBC Auto Differential     Standing Status:   Future     Standing Expiration Date:   2022    Comprehensive Metabolic Panel     Standing Status:   Future     Standing Expiration Date:   2022        No orders of the defined types were placed in this encounter. Conditions chronic and stable. Continue current treatment plan. Labs prior to next visit. Return in about 3 months (around 2021), or if symptoms worsen or fail to improve. Reviewed with the patient: current clinicalstatus, medications, activities and diet.      Side effects, adverse effects of the medication prescribedtoday, as well as treatment plan/ rationale and result expectations have been discussedwith the patient who expresses understanding and desires to proceed. Close follow upto evaluate treatment results and for coordination of care. I have reviewedthe patient's medical history in detail and updated the computerized patient record.     Jaclyn Costello, BO - CNP

## 2021-09-29 NOTE — PROGRESS NOTES
Vaccine Information Sheet, \"Influenza - Inactivated\"  given to Celena Pichardo, or parent/legal guardian of  Celena Pichardo and verbalized understanding. Patient responses:    Have you ever had a reaction to a flu vaccine? No  Are you able to eat eggs without adverse effects? Yes  Do you have any current illness? No  Have you ever had Guillian [place] Syndrome? No    Flu vaccine given per order. Please see immunization tab.

## 2021-10-19 NOTE — TELEPHONE ENCOUNTER
Requesting medication refill.  Please approve or deny this request.    Rx requested:  Requested Prescriptions     Pending Prescriptions Disp Refills    alendronate (FOSAMAX) 70 MG tablet 12 tablet 1     Sig: Take 1 tablet by mouth every 7 days       Last Office Visit:   9/29/2021    Last Filled:      Last Labs:      Next Visit Date:  Future Appointments   Date Time Provider Amy Rush   1/3/2022 10:30 AM Lynn Gutierrez, [address]

## 2021-10-20 RX ORDER — ALENDRONATE SODIUM 70 MG/1
70 TABLET ORAL
Qty: 12 TABLET | Refills: 1 | Status: SHIPPED | OUTPATIENT
Start: 2021-10-20 | End: 2022-04-11

## 2021-12-14 DIAGNOSIS — I10 ESSENTIAL HYPERTENSION: ICD-10-CM

## 2021-12-14 DIAGNOSIS — E03.4 HYPOTHYROIDISM DUE TO ACQUIRED ATROPHY OF THYROID: ICD-10-CM

## 2021-12-14 RX ORDER — LISINOPRIL 10 MG/1
TABLET ORAL
Qty: 90 TABLET | Refills: 2 | Status: SHIPPED | OUTPATIENT
Start: 2021-12-14

## 2021-12-14 RX ORDER — LEVOTHYROXINE SODIUM 0.07 MG/1
TABLET ORAL
Qty: 90 TABLET | Refills: 3 | Status: SHIPPED | OUTPATIENT
Start: 2021-12-14

## 2021-12-14 NOTE — TELEPHONE ENCOUNTER
Requesting medication refill.  Please approve or deny this request.    Rx requested:  Requested Prescriptions     Pending Prescriptions Disp Refills    lisinopril (PRINIVIL;ZESTRIL) 10 MG tablet 90 tablet 2     Sig: TAKE 1 TABLET BY MOUTH DAILY    levothyroxine (SYNTHROID) 75 MCG tablet 90 tablet 3     Sig: TAKE 1 TABLET BY MOUTH EVERY DAY       Last Office Visit:   9/29/2021    Last Filled:      Last Labs:      Next Visit Date:  Future Appointments   Date Time Provider Amy Rush   1/3/2022 10:30 AM Lynn Moses, [address]

## 2022-01-20 ENCOUNTER — OFFICE VISIT (OUTPATIENT)
Dept: FAMILY MEDICINE CLINIC | Age: 82
End: 2022-01-20
Payer: MEDICARE

## 2022-01-20 VITALS
TEMPERATURE: 97.2 F | BODY MASS INDEX: 27.16 KG/M2 | HEART RATE: 83 BPM | WEIGHT: 169 LBS | HEIGHT: 66 IN | DIASTOLIC BLOOD PRESSURE: 72 MMHG | OXYGEN SATURATION: 100 % | SYSTOLIC BLOOD PRESSURE: 136 MMHG

## 2022-01-20 DIAGNOSIS — E03.4 HYPOTHYROIDISM DUE TO ACQUIRED ATROPHY OF THYROID: ICD-10-CM

## 2022-01-20 DIAGNOSIS — E03.4 HYPOTHYROIDISM DUE TO ACQUIRED ATROPHY OF THYROID: Primary | ICD-10-CM

## 2022-01-20 DIAGNOSIS — I10 ESSENTIAL HYPERTENSION: ICD-10-CM

## 2022-01-20 LAB
ALBUMIN SERPL-MCNC: 4.9 G/DL (ref 3.5–4.6)
ALP BLD-CCNC: 60 U/L (ref 40–130)
ALT SERPL-CCNC: 11 U/L (ref 0–33)
ANION GAP SERPL CALCULATED.3IONS-SCNC: 11 MEQ/L (ref 9–15)
AST SERPL-CCNC: 20 U/L (ref 0–35)
BILIRUB SERPL-MCNC: 0.6 MG/DL (ref 0.2–0.7)
BUN BLDV-MCNC: 21 MG/DL (ref 8–23)
CALCIUM SERPL-MCNC: 9.8 MG/DL (ref 8.5–9.9)
CHLORIDE BLD-SCNC: 102 MEQ/L (ref 95–107)
CHOLESTEROL, TOTAL: 189 MG/DL (ref 0–199)
CO2: 28 MEQ/L (ref 20–31)
CREAT SERPL-MCNC: 0.83 MG/DL (ref 0.5–0.9)
GFR AFRICAN AMERICAN: >60
GFR NON-AFRICAN AMERICAN: >60
GLOBULIN: 3 G/DL (ref 2.3–3.5)
GLUCOSE BLD-MCNC: 101 MG/DL (ref 70–99)
HDLC SERPL-MCNC: 91 MG/DL (ref 40–59)
LDL CHOLESTEROL CALCULATED: 83 MG/DL (ref 0–129)
POTASSIUM SERPL-SCNC: 4.2 MEQ/L (ref 3.4–4.9)
SODIUM BLD-SCNC: 141 MEQ/L (ref 135–144)
TOTAL PROTEIN: 7.9 G/DL (ref 6.3–8)
TRIGL SERPL-MCNC: 77 MG/DL (ref 0–150)
TSH SERPL DL<=0.05 MIU/L-ACNC: 1.55 UIU/ML (ref 0.44–3.86)

## 2022-01-20 PROCEDURE — 99214 OFFICE O/P EST MOD 30 MIN: CPT | Performed by: NURSE PRACTITIONER

## 2022-01-20 RX ORDER — LOVASTATIN 40 MG/1
TABLET ORAL
Qty: 90 TABLET | Refills: 3 | Status: SHIPPED | OUTPATIENT
Start: 2022-01-20 | End: 2022-05-23 | Stop reason: SDUPTHER

## 2022-01-20 ASSESSMENT — PATIENT HEALTH QUESTIONNAIRE - PHQ9
SUM OF ALL RESPONSES TO PHQ9 QUESTIONS 1 & 2: 0
SUM OF ALL RESPONSES TO PHQ QUESTIONS 1-9: 0
1. LITTLE INTEREST OR PLEASURE IN DOING THINGS: 0
2. FEELING DOWN, DEPRESSED OR HOPELESS: 0
SUM OF ALL RESPONSES TO PHQ QUESTIONS 1-9: 0

## 2022-01-20 ASSESSMENT — ENCOUNTER SYMPTOMS
SHORTNESS OF BREATH: 0
BLURRED VISION: 0
ORTHOPNEA: 0

## 2022-01-20 NOTE — PROGRESS NOTES
Subjective:      Patient ID: Lelia Begum is a 78 y.o. female who presents today for:     Chief Complaint   Patient presents with    Hypertension     Patient presents today to follow up on hypertension. Hypertension  This is a chronic problem. The current episode started more than 1 year ago. The problem is unchanged. The problem is controlled. Pertinent negatives include no anxiety, blurred vision, chest pain, headaches, malaise/fatigue, neck pain, orthopnea, palpitations, peripheral edema, PND, shortness of breath or sweats. Past treatments include ACE inhibitors. The current treatment provides significant improvement. Hypothyroidism: Recent symptoms: none. She denies fatigue, weight gain, cold intolerance, heat intolerance, dry skin, constipation, diarrhea, edema, anxiety, tremor and palpitations. Patient is  taking her medication consistently on an empty stomach.     No results found for: [facility]  Lab Results   Component Value Date    TSH 1.830 04/26/2021    TSH 1.910 07/23/2020    TSH 2.000 01/22/2020         Past Medical History:   Diagnosis Date    Allergic rhinitis     Carotid artery occlusion     Carpal tunnel syndrome, bilateral     Colitis, ischemic ([address]     DJD (degenerative joint disease)     H/O: psoriasis     History of bad fall 2009    MULTIPLE CONTUSIONS    History of diverticulitis of colon     History of kidney stones     History of mammography, screening 2010    CAT 2    History of osteopenia     History of shingles     Hyperlipidemia     Hypertension     Hypothyroidism     Renal failure     MILD     Past Surgical History:   Procedure Laterality Date    BREAST SURGERY  80'S    R BREAST LUMPECTOMY    COLONOSCOPY  01/01/2010    SIGMOID DIVERTICULOSIS    HYSTERECTOMY  1976-PARTIAL    BENIGN TUMOR    JOINT REPLACEMENT Right 08/01/2012    right knee    JOINT REPLACEMENT Left 06/01/2021    left knee    KNEE ARTHROSCOPY  1992-RIGHT    TONSILLECTOMY      UPPER GASTROINTESTINAL ENDOSCOPY  01/01/2010    GEN NORMAL,GASTRIC MUCOSAL ERYTHEMIA     Family History   Problem Relation Age of Onset    Cancer Mother         BREAST    Diabetes Mother     Heart Attack Father     Cancer Sister         BREAST     Social History     Socioeconomic History    Marital status:      Spouse name: Not on file    Number of children: Not on file    Years of education: Not on file    Highest education level: Not on file   Occupational History    Not on file   Tobacco Use    Smoking status: Never Smoker    Smokeless tobacco: Never Used   Substance and Sexual Activity    Alcohol use: No    Drug use: No    Sexual activity: Never   Other Topics Concern    Not on file   Social History Narrative    Not on file     Social Determinants of Health     Financial Resource Strain: Low Risk     Difficulty of Paying Living Expenses: Not very hard   Food Insecurity: No Food Insecurity    Worried About Running Out of Food in the Last Year: Never true    Tania of Food in the Last Year: Never true   Transportation Needs: No Transportation Needs    Lack of Transportation (Medical): No    Lack of Transportation (Non-Medical):  No   Physical Activity:     Days of Exercise per Week: Not on file    Minutes of Exercise per Session: Not on file   Stress:     Feeling of Stress : Not on file   Social Connections:     Frequency of Communication with Friends and Family: Not on file    Frequency of Social Gatherings with Friends and Family: Not on file    Attends Anabaptist Services: Not on file    Active Member of Clubs or Organizations: Not on file    Attends Club or Organization Meetings: Not on file    Marital Status: Not on file   Intimate Partner Violence:     Fear of Current or Ex-Partner: Not on file    Emotionally Abused: Not on file    Physically Abused: Not on file    Sexually Abused: Not on file   Housing Stability:     Unable to Pay for Housing in the Last Year: Not on file    Number of Places Lived in the Last Year: Not on file    Unstable Housing in the Last Year: Not on file     Current Outpatient Medications on File Prior to Visit   Medication Sig Dispense Refill    lisinopril (PRINIVIL;ZESTRIL) 10 MG tablet TAKE 1 TABLET BY MOUTH DAILY 90 tablet 2    levothyroxine (SYNTHROID) 75 MCG tablet TAKE 1 TABLET BY MOUTH EVERY DAY 90 tablet 3    alendronate (FOSAMAX) 70 MG tablet Take 1 tablet by mouth every 7 days 12 tablet 1    aspirin 81 MG EC tablet Take 81 mg by mouth 2 times daily      calcium carbonate-vitamin D (CALTRATE) 600-400 MG-UNIT TABS per tab Take 1 tablet by mouth daily       No current facility-administered medications on file prior to visit. Allergies:  Seasonal    Review of Systems   Constitutional: Negative for malaise/fatigue. Eyes: Negative for blurred vision. Respiratory: Negative for shortness of breath. Cardiovascular: Negative for chest pain, palpitations, orthopnea and PND. Musculoskeletal: Negative for neck pain. Neurological: Negative for headaches. Objective:   /72 (Site: Left Upper Arm, Position: Sitting, Cuff Size: Medium Adult)   Pulse 83   Temp 97.2 °F (36.2 °C) (Temporal)   Ht 5' 6\" (1.676 m)   Wt 169 lb (76.7 kg)   SpO2 100%   BMI 27.28 kg/m²     Physical Exam  Constitutional:       Appearance: She is well-developed. HENT:      Head: Normocephalic. Right Ear: External ear normal.      Left Ear: External ear normal.      Nose: Nose normal.      Mouth/Throat:      Mouth: Mucous membranes are moist.      Pharynx: Oropharynx is clear. Eyes:      Conjunctiva/sclera: Conjunctivae normal.   Cardiovascular:      Rate and Rhythm: Normal rate and regular rhythm. Heart sounds: Normal heart sounds. Pulmonary:      Effort: Pulmonary effort is normal.      Breath sounds: Normal breath sounds. Musculoskeletal:         General: Normal range of motion. Cervical back: Normal range of motion.    Skin: General: Skin is warm and dry. Neurological:      Mental Status: She is alert and oriented to person, place, and time. Psychiatric:         Mood and Affect: Mood normal.         Behavior: Behavior normal.         Assessment:          Diagnosis Orders   1. Hypothyroidism due to acquired atrophy of thyroid  Lipid Panel    TSH without Reflex   2. Essential hypertension  Comprehensive Metabolic Panel    Lipid Panel       Plan:      Orders Placed This Encounter   Procedures    Comprehensive Metabolic Panel     Standing Status:   Future     Standing Expiration Date:   1/20/2023    Lipid Panel     Standing Status:   Future     Standing Expiration Date:   1/20/2023     Order Specific Question:   Is Patient Fasting?/# of Hours     Answer:   8    TSH without Reflex     Standing Status:   Future     Standing Expiration Date:   1/20/2023          Orders Placed This Encounter   Medications    lovastatin (MEVACOR) 40 MG tablet     Sig: TAKE 1 TABLET BY MOUTH EVERY DAY AT NIGHT     Dispense:  90 tablet     Refill:  3       Return in about 4 months (around 5/20/2022). Conditions chronic and stable. Continue current treatment plan. Will update with results of blood work and any changes if needed. Reviewed with the patient: current clinicalstatus, medications, activities and diet. Side effects, adverse effects of the medication prescribedtoday, as well as treatment plan/ rationale and result expectations have been discussedwith the patient who expresses understanding and desires to proceed. Close follow upto evaluate treatment results and for coordination of care. I have reviewedthe patient's medical history in detail and updated the computerized patient record.     Princess Richards, BO - CNP

## 2022-01-20 NOTE — PATIENT INSTRUCTIONS
Patient Education        DASH Diet: Care Instructions  Your Care Instructions     The DASH diet is an eating plan that can help lower your blood pressure. DASH stands for Dietary Approaches to Stop Hypertension. Hypertension is high blood pressure. The DASH diet focuses on eating foods that are high in calcium, potassium, and magnesium. These nutrients can lower blood pressure. The foods that are highest in these nutrients are fruits, vegetables, low-fat dairy products, nuts, seeds, and legumes. But taking calcium, potassium, and magnesium supplements instead of eating foods that are high in those nutrients does not have the same effect. The DASH diet also includes whole grains, fish, and poultry. The DASH diet is one of several lifestyle changes your doctor may recommend to lower your high blood pressure. Your doctor may also want you to decrease the amount of sodium in your diet. Lowering sodium while following the DASH diet can lower blood pressure even further than just the DASH diet alone. Follow-up care is a key part of your treatment and safety. Be sure to make and go to all appointments, and call your doctor if you are having problems. It's also a good idea to know your test results and keep a list of the medicines you take. How can you care for yourself at home? Following the DASH diet  · Eat 4 to 5 servings of fruit each day. A serving is 1 medium-sized piece of fruit, ½ cup chopped or canned fruit, 1/4 cup dried fruit, or 4 ounces (½ cup) of fruit juice. Choose fruit more often than fruit juice. · Eat 4 to 5 servings of vegetables each day. A serving is 1 cup of lettuce or raw leafy vegetables, ½ cup of chopped or cooked vegetables, or 4 ounces (½ cup) of vegetable juice. Choose vegetables more often than vegetable juice. · Get 2 to 3 servings of low-fat and fat-free dairy each day. A serving is 8 ounces of milk, 1 cup of yogurt, or 1 ½ ounces of cheese. · Eat 6 to 8 servings of grains each day. A serving is 1 slice of bread, 1 ounce of dry cereal, or ½ cup of cooked rice, pasta, or cooked cereal. Try to choose whole-grain products as much as possible. · Limit lean meat, poultry, and fish to 2 servings each day. A serving is 3 ounces, about the size of a deck of cards. · Eat 4 to 5 servings of nuts, seeds, and legumes (cooked dried beans, lentils, and split peas) each week. A serving is 1/3 cup of nuts, 2 tablespoons of seeds, or ½ cup of cooked beans or peas. · Limit fats and oils to 2 to 3 servings each day. A serving is 1 teaspoon of vegetable oil or 2 tablespoons of salad dressing. · Limit sweets and added sugars to 5 servings or less a week. A serving is 1 tablespoon jelly or jam, ½ cup sorbet, or 1 cup of lemonade. · Eat less than 2,300 milligrams (mg) of sodium a day. If you limit your sodium to 1,500 mg a day, you can lower your blood pressure even more. · Be aware that all of these are the suggested number of servings for people who eat 1,800 to 2,000 calories a day. Your recommended number of servings may be different if you need more or fewer calories. Tips for success  · Start small. Do not try to make dramatic changes to your diet all at once. You might feel that you are missing out on your favorite foods and then be more likely to not follow the plan. Make small changes, and stick with them. Once those changes become habit, add a few more changes. · Try some of the following:  ? Make it a goal to eat a fruit or vegetable at every meal and at snacks. This will make it easy to get the recommended amount of fruits and vegetables each day. ? Try yogurt topped with fruit and nuts for a snack or healthy dessert. ? Add lettuce, tomato, cucumber, and onion to sandwiches. ? Combine a ready-made pizza crust with low-fat mozzarella cheese and lots of vegetable toppings. Try using tomatoes, squash, spinach, broccoli, carrots, cauliflower, and onions. ?  Have a variety of cut-up vegetables with a low-fat dip as an appetizer instead of chips and dip. ? Sprinkle sunflower seeds or chopped almonds over salads. Or try adding chopped walnuts or almonds to cooked vegetables. ? Try some vegetarian meals using beans and peas. Add garbanzo or kidney beans to salads. Make burritos and tacos with mashed flores beans or black beans. Where can you learn more? Go to https://[email or web address][email or web address]. org and sign in to your [employer] account. Enter C135 in the [employer] box to learn more about \"DASH Diet: Care Instructions. \"     If you do not have an account, please click on the \"Sign Up Now\" link. Current as of: April 29, 2021               Content Version: 13.1  © 1429-5880 Healthwise, Incorporated. Care instructions adapted under license by [facility] ([facility]). If you have questions about a medical condition or this instruction, always ask your healthcare professional. [address] any warranty or liability for your use of this information.

## 2022-04-11 RX ORDER — ALENDRONATE SODIUM 70 MG/1
TABLET ORAL
Qty: 12 TABLET | Refills: 1 | Status: SHIPPED | OUTPATIENT
Start: 2022-04-11 | End: 2022-11-01

## 2022-05-06 ENCOUNTER — COMMUNITY OUTREACH (OUTPATIENT)
Dept: INTERNAL MEDICINE | Age: 82
End: 2022-05-06

## 2022-05-23 ENCOUNTER — OFFICE VISIT (OUTPATIENT)
Dept: FAMILY MEDICINE CLINIC | Age: 82
End: 2022-05-23
Payer: MEDICARE

## 2022-05-23 ENCOUNTER — OFFICE VISIT (OUTPATIENT)
Dept: FAMILY MEDICINE CLINIC | Age: 82
End: 2022-05-23

## 2022-05-23 VITALS
SYSTOLIC BLOOD PRESSURE: 134 MMHG | DIASTOLIC BLOOD PRESSURE: 78 MMHG | HEART RATE: 78 BPM | HEIGHT: 66 IN | OXYGEN SATURATION: 95 % | TEMPERATURE: 96.8 F | WEIGHT: 162 LBS | BODY MASS INDEX: 26.03 KG/M2

## 2022-05-23 VITALS
TEMPERATURE: 96.8 F | WEIGHT: 162.4 LBS | DIASTOLIC BLOOD PRESSURE: 78 MMHG | HEIGHT: 66 IN | OXYGEN SATURATION: 95 % | BODY MASS INDEX: 26.1 KG/M2 | SYSTOLIC BLOOD PRESSURE: 134 MMHG | HEART RATE: 78 BPM

## 2022-05-23 DIAGNOSIS — I10 ESSENTIAL HYPERTENSION: ICD-10-CM

## 2022-05-23 DIAGNOSIS — Z00.00 MEDICARE ANNUAL WELLNESS VISIT, SUBSEQUENT: Primary | ICD-10-CM

## 2022-05-23 DIAGNOSIS — E03.4 HYPOTHYROIDISM DUE TO ACQUIRED ATROPHY OF THYROID: ICD-10-CM

## 2022-05-23 DIAGNOSIS — E78.2 MIXED HYPERLIPIDEMIA: ICD-10-CM

## 2022-05-23 DIAGNOSIS — Z76.89 ENCOUNTER TO ESTABLISH CARE WITH NEW DOCTOR: Primary | ICD-10-CM

## 2022-05-23 PROCEDURE — 99214 OFFICE O/P EST MOD 30 MIN: CPT | Performed by: STUDENT IN AN ORGANIZED HEALTH CARE EDUCATION/TRAINING PROGRAM

## 2022-05-23 PROCEDURE — G0439 PPPS, SUBSEQ VISIT: HCPCS | Performed by: STUDENT IN AN ORGANIZED HEALTH CARE EDUCATION/TRAINING PROGRAM

## 2022-05-23 RX ORDER — LOVASTATIN 40 MG/1
TABLET ORAL
Qty: 90 TABLET | Refills: 1 | Status: SHIPPED | OUTPATIENT
Start: 2022-05-23

## 2022-05-23 ASSESSMENT — ENCOUNTER SYMPTOMS
VOMITING: 0
WHEEZING: 0
DIARRHEA: 0
RHINORRHEA: 0
COUGH: 0
NAUSEA: 0
ABDOMINAL PAIN: 0
SHORTNESS OF BREATH: 0
SORE THROAT: 0

## 2022-05-23 ASSESSMENT — PATIENT HEALTH QUESTIONNAIRE - PHQ9
SUM OF ALL RESPONSES TO PHQ QUESTIONS 1-9: 0
2. FEELING DOWN, DEPRESSED OR HOPELESS: 0
SUM OF ALL RESPONSES TO PHQ QUESTIONS 1-9: 0
SUM OF ALL RESPONSES TO PHQ9 QUESTIONS 1 & 2: 0
SUM OF ALL RESPONSES TO PHQ QUESTIONS 1-9: 0
1. LITTLE INTEREST OR PLEASURE IN DOING THINGS: 0
SUM OF ALL RESPONSES TO PHQ QUESTIONS 1-9: 0

## 2022-05-23 ASSESSMENT — LIFESTYLE VARIABLES: HOW OFTEN DO YOU HAVE A DRINK CONTAINING ALCOHOL: NEVER

## 2022-05-23 NOTE — PROGRESS NOTES
Subjective  Alpesh Mosqueda, 78 y.o. female presents today with:  Chief Complaint   Patient presents with    Follow-up    Hypertension     Does not check BP at home. Denies any chest pain, heart palpitations, headaches or dizziness.  Hypothyroidism    Hyperlipidemia     HPI    Patient with appointment to establish care with new physician. She was previously staying with the [facility]. Patient with essential hypertension. She is normotensive in the office today. She is on lisinopril 10 mg daily. She tolerates medication well. No side effects with medication. She does not check her blood pressures at home. She denies any chest pain, shortness of breath or palpitations. Patient also with hypothyroidism. She is on levothyroxine 75 mcg daily. She is tolerating well. No side effects. She last had her TSH checked in January. It was within normal limits. No intolerance to heat or cold. No changes to hair skin or nails. Patient also with mixed hyperlipidemia. She is on lovastatin 40 mg daily. She tolerates well. No side effects. She last had a lipid panel done in January. She has no other concerns at this time. Review of Systems   Constitutional: Negative for chills, fatigue, fever and unexpected weight change. HENT: Negative for congestion, rhinorrhea and sore throat. Respiratory: Negative for cough, shortness of breath and wheezing. Cardiovascular: Negative for chest pain, palpitations and leg swelling. Gastrointestinal: Negative for abdominal pain, diarrhea, nausea and vomiting. Musculoskeletal: Negative for arthralgias and joint swelling. Skin: Negative for rash. Neurological: Negative for weakness, light-headedness, numbness and headaches. Psychiatric/Behavioral: Negative for confusion. The patient is not nervous/anxious.         Past Medical History:   Diagnosis Date    Allergic rhinitis     Carotid artery occlusion     Carpal tunnel syndrome, bilateral  Colitis, ischemic ([facility] [address]     DJD (degenerative joint disease)     H/O: psoriasis     History of bad fall 2009    MULTIPLE CONTUSIONS    History of diverticulitis of colon     History of kidney stones     History of mammography, screening 2010    CAT 2    History of osteopenia     History of shingles     Hyperlipidemia     Hypertension     Hypothyroidism     Renal failure     MILD     Past Surgical History:   Procedure Laterality Date    BREAST SURGERY  80'S    R BREAST LUMPECTOMY    COLONOSCOPY  01/01/2010    SIGMOID DIVERTICULOSIS    HYSTERECTOMY  1976-PARTIAL    BENIGN TUMOR    JOINT REPLACEMENT Right 08/01/2012    right knee    JOINT REPLACEMENT Left 06/01/2021    left knee    KNEE ARTHROSCOPY  1992-RIGHT    TONSILLECTOMY      UPPER GASTROINTESTINAL ENDOSCOPY  01/01/2010    GEN NORMAL,GASTRIC MUCOSAL ERYTHEMIA     Current Outpatient Medications   Medication Sig Dispense Refill    lovastatin (MEVACOR) 40 MG tablet TAKE 1 TABLET BY MOUTH EVERY DAY AT NIGHT 90 tablet 1    alendronate (FOSAMAX) 70 MG tablet TAKE 1 TABLET BY MOUTH ONE TIME PER WEEK 12 tablet 1    lisinopril (PRINIVIL;ZESTRIL) 10 MG tablet TAKE 1 TABLET BY MOUTH DAILY 90 tablet 2    levothyroxine (SYNTHROID) 75 MCG tablet TAKE 1 TABLET BY MOUTH EVERY DAY 90 tablet 3    aspirin 81 MG EC tablet Take 81 mg by mouth 2 times daily      calcium carbonate-vitamin D (CALTRATE) 600-400 MG-UNIT TABS per tab Take 1 tablet by mouth daily       No current facility-administered medications for this visit. PMH, Surgical Hx, Family Hx, and Social Hx reviewed and updated. Health Maintenance reviewed. Objective    Vitals:    05/23/22 1122   BP: 134/78   Pulse: 78   Temp: 96.8 °F (36 °C)   SpO2: 95%   Weight: 162 lb 6.4 oz (73.7 kg)   Height: 5' 6\" (1.676 m)       Physical Exam  Vitals reviewed. Constitutional:       General: She is not in acute distress. HENT:      Head: Normocephalic and atraumatic.    Eyes: Conjunctiva/sclera: Conjunctivae normal.   Neck:      Thyroid: No thyromegaly or thyroid tenderness. Cardiovascular:      Rate and Rhythm: Normal rate and regular rhythm. Heart sounds: No murmur heard. No friction rub. No gallop. Pulmonary:      Effort: Pulmonary effort is normal.      Breath sounds: Normal breath sounds. No wheezing, rhonchi or rales. Abdominal:      General: Bowel sounds are normal. There is no distension. Palpations: Abdomen is soft. Tenderness: There is no abdominal tenderness. Musculoskeletal:         General: No swelling or deformity. Cervical back: Normal range of motion and neck supple. Right lower leg: No edema. Left lower leg: No edema. Lymphadenopathy:      Cervical: No cervical adenopathy. Skin:     General: Skin is warm and dry. Findings: No rash. Neurological:      General: No focal deficit present. Mental Status: She is alert. Gait: Gait is intact. Psychiatric:         Mood and Affect: Mood normal.         Behavior: Behavior normal.        Assessment & Plan     1. Encounter to establish care with new doctor  Patient with appointment to establish care with new physician. She was previously seeing Jose [facility]. 2. Essential hypertension  Stable, chronic. Continue lisinopril 10 mg daily. No refills needed at this time. She is not due for labs. She is normotensive in the office today. Follow-up in 6 months. 3. Hypothyroidism due to acquired atrophy of thyroid  Stable, chronic. Last TSH within normal range. Continue levothyroxine 75 mcg daily. Tolerating well. 4. Mixed hyperlipidemia  Stable, chronic. Continue lovastatin 40 mg daily. New prescription sent to the pharmacy. Follow-up in 6 months. - lovastatin (MEVACOR) 40 MG tablet; TAKE 1 TABLET BY MOUTH EVERY DAY AT NIGHT  Dispense: 90 tablet; Refill: 1    No orders of the defined types were placed in this encounter.     Orders Placed This Encounter Medications    lovastatin (MEVACOR) 40 MG tablet     Sig: TAKE 1 TABLET BY MOUTH EVERY DAY AT NIGHT     Dispense:  90 tablet     Refill:  1     Medications Discontinued During This Encounter   Medication Reason    lovastatin (MEVACOR) 40 MG tablet REORDER     Return in about 6 months (around 11/23/2022) for follow up. Reviewed with the patient: current clinical status,medications, activities and diet. Side effects, adverse effects of themedication prescribed today, as well as treatment plan/ rationale and result expectations have been discussed with the patient who expresses understanding and desires to proceed. Close follow up to evaluate treatment results and for coordination of care. I have reviewed the patient's medical history in detail and updated the computerized patient record. Please note, this report has been partially produced using speech recognition software and may cause  and /or contain errors related to that system including grammar, punctuation and spelling as well as words and phrases that may seem inappropriate. If there are questions or concerns please feel free to contact me to clarify.     Alejo Sandoval, DO

## 2022-05-23 NOTE — PATIENT INSTRUCTIONS
Personalized Preventive Plan for Saroj Faustin - 5/23/2022  Medicare offers a range of preventive health benefits. Some of the tests and screenings are paid in full while other may be subject to a deductible, co-insurance, and/or copay. Some of these benefits include a comprehensive review of your medical history including lifestyle, illnesses that may run in your family, and various assessments and screenings as appropriate. After reviewing your medical record and screening and assessments performed today your provider may have ordered immunizations, labs, imaging, and/or referrals for you. A list of these orders (if applicable) as well as your Preventive Care list are included within your After Visit Summary for your review. Other Preventive Recommendations:    · A preventive eye exam performed by an eye specialist is recommended every 1-2 years to screen for glaucoma; cataracts, macular degeneration, and other eye disorders. · A preventive dental visit is recommended every 6 months. · Try to get at least 150 minutes of exercise per week or 10,000 steps per day on a pedometer . · Order or download the FREE \"Exercise & Physical Activity: Your Everyday Guide\" from [employer]. Call 5-999.152.8762 or search [employer] online. · You need 2133-5646 mg of calcium and 6517-1104 IU of vitamin D per day. It is possible to meet your calcium requirement with diet alone, but a vitamin D supplement is usually necessary to meet this goal.  · When exposed to the sun, use a sunscreen that protects against both UVA and UVB radiation with an SPF of 30 or greater. Reapply every 2 to 3 hours or after sweating, drying off with a towel, or swimming. · Always wear a seat belt when traveling in a car. Always wear a helmet when riding a bicycle or motorcycle.

## 2022-05-23 NOTE — PROGRESS NOTES
eyesight?: No  Have you had an eye exam within the past year?: (!) No  No exam data present    Hearing/Vision Interventions:  · Vision concerns:  patient encouraged to make appointment with his/her eye specialist            Objective   Vitals:    05/23/22 1151   BP: 134/78   Pulse: 78   Temp: 96.8 °F (36 °C)   SpO2: 95%   Weight: 162 lb (73.5 kg)   Height: 5' 6\" (1.676 m)      Body mass index is 26.15 kg/m². Allergies   Allergen Reactions    Seasonal      Prior to Visit Medications    Medication Sig Taking?  Authorizing Provider   lovastatin (MEVACOR) 40 MG tablet TAKE 1 TABLET BY MOUTH EVERY DAY AT NIGHT Yes Tamika Sharp DO   alendronate (FOSAMAX) 70 MG tablet TAKE 1 TABLET BY MOUTH ONE TIME PER WEEK Yes BO Germain CNP   lisinopril (PRINIVIL;ZESTRIL) 10 MG tablet TAKE 1 TABLET BY MOUTH DAILY Yes BO Ambrose CNP   levothyroxine (SYNTHROID) 75 MCG tablet TAKE 1 TABLET BY MOUTH EVERY DAY Yes BO Ambrose CNP   aspirin 81 MG EC tablet Take 81 mg by mouth 2 times daily Yes Historical Provider, MD   calcium carbonate-vitamin D (CALTRATE) 600-400 MG-UNIT TABS per tab Take 1 tablet by mouth daily Yes Historical Provider, MD [place] (Including outside providers/suppliers regularly involved in providing care):   Patient Care Team:  Barrie Florence DO as PCP - General (Family Medicine)  Barrie Florence DO as PCP - [facility] Empaneled Provider     Reviewed and updated this visit:  Tobacco  Allergies  Meds  Problems  Med Hx  Surg Hx  Soc Hx  Fam Hx

## 2022-05-26 ENCOUNTER — IMMUNIZATION (OUTPATIENT)
Dept: FAMILY MEDICINE CLINIC | Age: 82
End: 2022-05-26
Payer: MEDICARE

## 2022-05-26 PROCEDURE — 0064A COVID-19, MODERNA BOOSTER VACCINE 0.25ML DOSE: CPT | Performed by: FAMILY MEDICINE

## 2022-05-26 PROCEDURE — 91306 COVID-19, MODERNA BOOSTER VACCINE 0.25ML DOSE: CPT | Performed by: FAMILY MEDICINE

## 2022-11-01 RX ORDER — ALENDRONATE SODIUM 70 MG/1
TABLET ORAL
Qty: 12 TABLET | Refills: 1 | Status: SHIPPED | OUTPATIENT
Start: 2022-11-01

## 2022-11-21 ENCOUNTER — OFFICE VISIT (OUTPATIENT)
Dept: FAMILY MEDICINE CLINIC | Age: 82
End: 2022-11-21
Payer: MEDICARE

## 2022-11-21 VITALS
BODY MASS INDEX: 26.03 KG/M2 | WEIGHT: 162 LBS | HEART RATE: 76 BPM | SYSTOLIC BLOOD PRESSURE: 139 MMHG | OXYGEN SATURATION: 99 % | TEMPERATURE: 97.7 F | HEIGHT: 66 IN | DIASTOLIC BLOOD PRESSURE: 70 MMHG

## 2022-11-21 DIAGNOSIS — E78.2 MIXED HYPERLIPIDEMIA: ICD-10-CM

## 2022-11-21 DIAGNOSIS — I10 ESSENTIAL HYPERTENSION: ICD-10-CM

## 2022-11-21 DIAGNOSIS — E03.4 HYPOTHYROIDISM DUE TO ACQUIRED ATROPHY OF THYROID: ICD-10-CM

## 2022-11-21 DIAGNOSIS — Z23 NEED FOR INFLUENZA VACCINATION: ICD-10-CM

## 2022-11-21 DIAGNOSIS — I10 ESSENTIAL HYPERTENSION: Primary | ICD-10-CM

## 2022-11-21 LAB
ALBUMIN SERPL-MCNC: 4.9 G/DL (ref 3.5–4.6)
ALP BLD-CCNC: 59 U/L (ref 40–130)
ALT SERPL-CCNC: 11 U/L (ref 0–33)
ANION GAP SERPL CALCULATED.3IONS-SCNC: 13 MEQ/L (ref 9–15)
AST SERPL-CCNC: 18 U/L (ref 0–35)
BILIRUB SERPL-MCNC: 0.6 MG/DL (ref 0.2–0.7)
BUN BLDV-MCNC: 20 MG/DL (ref 8–23)
CALCIUM SERPL-MCNC: 9.7 MG/DL (ref 8.5–9.9)
CHLORIDE BLD-SCNC: 103 MEQ/L (ref 95–107)
CHOLESTEROL, FASTING: 187 MG/DL (ref 0–199)
CO2: 27 MEQ/L (ref 20–31)
CREAT SERPL-MCNC: 0.8 MG/DL (ref 0.5–0.9)
GFR SERPL CREATININE-BSD FRML MDRD: >60 ML/MIN/{1.73_M2}
GLOBULIN: 2.4 G/DL (ref 2.3–3.5)
GLUCOSE FASTING: 94 MG/DL (ref 70–99)
HDLC SERPL-MCNC: 78 MG/DL (ref 40–59)
LDL CHOLESTEROL CALCULATED: 92 MG/DL (ref 0–129)
POTASSIUM SERPL-SCNC: 4.4 MEQ/L (ref 3.4–4.9)
SODIUM BLD-SCNC: 143 MEQ/L (ref 135–144)
TOTAL PROTEIN: 7.3 G/DL (ref 6.3–8)
TRIGLYCERIDE, FASTING: 84 MG/DL (ref 0–150)
TSH REFLEX: 0.91 UIU/ML (ref 0.44–3.86)

## 2022-11-21 PROCEDURE — 90471 IMMUNIZATION ADMIN: CPT | Performed by: STUDENT IN AN ORGANIZED HEALTH CARE EDUCATION/TRAINING PROGRAM

## 2022-11-21 PROCEDURE — 90715 TDAP VACCINE 7 YRS/> IM: CPT | Performed by: STUDENT IN AN ORGANIZED HEALTH CARE EDUCATION/TRAINING PROGRAM

## 2022-11-21 PROCEDURE — 3078F DIAST BP <80 MM HG: CPT | Performed by: STUDENT IN AN ORGANIZED HEALTH CARE EDUCATION/TRAINING PROGRAM

## 2022-11-21 PROCEDURE — 1123F ACP DISCUSS/DSCN MKR DOCD: CPT | Performed by: STUDENT IN AN ORGANIZED HEALTH CARE EDUCATION/TRAINING PROGRAM

## 2022-11-21 PROCEDURE — 99214 OFFICE O/P EST MOD 30 MIN: CPT | Performed by: STUDENT IN AN ORGANIZED HEALTH CARE EDUCATION/TRAINING PROGRAM

## 2022-11-21 PROCEDURE — 3074F SYST BP LT 130 MM HG: CPT | Performed by: STUDENT IN AN ORGANIZED HEALTH CARE EDUCATION/TRAINING PROGRAM

## 2022-11-21 PROCEDURE — G0008 ADMIN INFLUENZA VIRUS VAC: HCPCS | Performed by: STUDENT IN AN ORGANIZED HEALTH CARE EDUCATION/TRAINING PROGRAM

## 2022-11-21 PROCEDURE — 90694 VACC AIIV4 NO PRSRV 0.5ML IM: CPT | Performed by: STUDENT IN AN ORGANIZED HEALTH CARE EDUCATION/TRAINING PROGRAM

## 2022-11-21 RX ORDER — LOVASTATIN 40 MG/1
TABLET ORAL
Qty: 90 TABLET | Refills: 1 | Status: SHIPPED | OUTPATIENT
Start: 2022-11-21

## 2022-11-21 RX ORDER — LISINOPRIL 10 MG/1
TABLET ORAL
Qty: 90 TABLET | Refills: 1 | Status: SHIPPED | OUTPATIENT
Start: 2022-11-21

## 2022-11-21 RX ORDER — LEVOTHYROXINE SODIUM 0.07 MG/1
TABLET ORAL
Qty: 90 TABLET | Refills: 1 | Status: SHIPPED | OUTPATIENT
Start: 2022-11-21

## 2022-11-21 SDOH — ECONOMIC STABILITY: FOOD INSECURITY: WITHIN THE PAST 12 MONTHS, THE FOOD YOU BOUGHT JUST DIDN'T LAST AND YOU DIDN'T HAVE MONEY TO GET MORE.: NEVER TRUE

## 2022-11-21 SDOH — ECONOMIC STABILITY: FOOD INSECURITY: WITHIN THE PAST 12 MONTHS, YOU WORRIED THAT YOUR FOOD WOULD RUN OUT BEFORE YOU GOT MONEY TO BUY MORE.: NEVER TRUE

## 2022-11-21 ASSESSMENT — SOCIAL DETERMINANTS OF HEALTH (SDOH): HOW HARD IS IT FOR YOU TO PAY FOR THE VERY BASICS LIKE FOOD, HOUSING, MEDICAL CARE, AND HEATING?: SOMEWHAT HARD

## 2022-11-21 ASSESSMENT — PATIENT HEALTH QUESTIONNAIRE - PHQ9
SUM OF ALL RESPONSES TO PHQ QUESTIONS 1-9: 1
SUM OF ALL RESPONSES TO PHQ QUESTIONS 1-9: 1
SUM OF ALL RESPONSES TO PHQ9 QUESTIONS 1 & 2: 1
SUM OF ALL RESPONSES TO PHQ QUESTIONS 1-9: 1
1. LITTLE INTEREST OR PLEASURE IN DOING THINGS: 0
SUM OF ALL RESPONSES TO PHQ QUESTIONS 1-9: 1
2. FEELING DOWN, DEPRESSED OR HOPELESS: 1

## 2022-11-21 ASSESSMENT — ENCOUNTER SYMPTOMS
DIARRHEA: 0
SORE THROAT: 0
VOMITING: 0
RHINORRHEA: 0
WHEEZING: 0
ABDOMINAL PAIN: 0
NAUSEA: 0
COUGH: 0
EYE DISCHARGE: 0
SHORTNESS OF BREATH: 0

## 2022-11-21 NOTE — PATIENT INSTRUCTIONS
Family and Housing Resources    [address]  Call 211  or - www. [address] [place], [place] [address] [address] [place] [address])  Call - [phone]  [email or web address]    [facility]  [address]  Axel, [address] [place] [address] 82  [address] [facility] [address]  [phone] /[phone]    Medicaid Application  [email or web address]  [phone]    Home Energy Assistance Programs (HEAP)  [address]  [phone]    [facility] ( [facility])  [address] [facility], [address] [facility] ([facility])  [address] [facility]  [address] [facility]  www. [email or web address]    [employer]  [address] [place] [address], [facility] [address]  [address] [facility]  [address] Gina [address]ierrez [phone] [id number] [facility] at [address] [facility] Family and Housing [address]  Call 211  or - www. [address] [place] [address])  Call - [phone]  [email or web address]    [place] [address]  [place], [address] [place] [address] 82  [address] [facility] [address]  [phone] /[phone]    Medicaid Application  Https://benefits. [email or web address].[email or web address]  [phone]    Home Energy Assistance Programs (HEAP)  [address] Himanshu, [address] [place] [address]  [phone]    [facility] ( [facility])  [address] [facility], [address] [facility] ([facility])  [address], [address] [facility]  [address] [facility]  www. [email or web address]    [employer]  [address] [place] [address], [facility] [address]  [address] [facility]  [address] [facility] [address] at [address] [facility]

## 2022-11-21 NOTE — PROGRESS NOTES
Subjective  Osiel Ibrahim, 78 y.o. female presents today with:  Chief Complaint   Patient presents with    6 Month Follow-Up    Hypertension     Does not check BP at home. Denies any chest pain, heart palpitations, headaches, dizziness or SOB. Hypothyroidism    Hyperlipidemia    Depression     Has been feeling very down & depressed since her sig other passed in July. HPI    Patient with appointment for 6-month follow-up. Patient with essential hypertension. Slightly hypertensive initially. She does not check blood pressure at home. She denies any chest pain, shortness of breath, palpitations, headaches or dizziness. She is currently on lisinopril 10 mg daily. Tolerating well. Patient also with hypothyroidism. She is on levothyroxine 75 mcg daily. Tolerating well. No side effects. No changes to hair skin or nails. No heat or cold intolerance. Patient also with mixed hyperlipidemia. She is on lovastatin 40 mg daily. Tolerating well. She does need refill. She is due for lipid panel. Patient also with recent death. She states that her longtime friend  unexpectedly and she found him laying in his bedroom. This happened in July. She still struggles with some grief. She states she feels that she is getting over it okay. She denies any need for medication or to talk to someone. She has good family support. She has no other concerns at this time. She needs her Tdap and flu shot today. Review of Systems   Constitutional:  Negative for chills, fatigue, fever and unexpected weight change. HENT:  Negative for congestion, rhinorrhea and sore throat. Eyes:  Negative for discharge. Respiratory:  Negative for cough, shortness of breath and wheezing. Cardiovascular:  Negative for chest pain, palpitations and leg swelling. Gastrointestinal:  Negative for abdominal pain, diarrhea, nausea and vomiting. Genitourinary:  Negative for difficulty urinating. Musculoskeletal:  Negative for arthralgias and joint swelling. Skin:  Negative for rash. Neurological:  Negative for weakness, light-headedness, numbness and headaches. Psychiatric/Behavioral:  Negative for confusion and suicidal ideas. The patient is not nervous/anxious.       Past Medical History:   Diagnosis Date    Allergic rhinitis     Carotid artery occlusion     Carpal tunnel syndrome, bilateral     Colitis, ischemic (HCC)     DJD (degenerative joint disease)     H/O: psoriasis     History of bad fall 2009    MULTIPLE CONTUSIONS    History of diverticulitis of colon     History of kidney stones     History of mammography, screening 2010    CAT 2    History of osteopenia     History of shingles     Hyperlipidemia     Hypertension     Hypothyroidism     Renal failure     MILD     Past Surgical History:   Procedure Laterality Date    BREAST SURGERY  80'S    R BREAST LUMPECTOMY    COLONOSCOPY  01/01/2010    SIGMOID DIVERTICULOSIS    HYSTERECTOMY (CERVIX STATUS UNKNOWN)  1976-PARTIAL    BENIGN TUMOR    JOINT REPLACEMENT Right 08/01/2012    right knee    JOINT REPLACEMENT Left 06/01/2021    left knee    KNEE ARTHROSCOPY  1992-RIGHT    TONSILLECTOMY      UPPER GASTROINTESTINAL ENDOSCOPY  01/01/2010    GEN NORMAL,GASTRIC MUCOSAL ERYTHEMIA     Current Outpatient Medications   Medication Sig Dispense Refill    levothyroxine (SYNTHROID) 75 MCG tablet TAKE 1 TABLET BY MOUTH EVERY DAY 90 tablet 1    lisinopril (PRINIVIL;ZESTRIL) 10 MG tablet TAKE 1 TABLET BY MOUTH DAILY 90 tablet 1    lovastatin (MEVACOR) 40 MG tablet TAKE 1 TABLET BY MOUTH EVERY DAY AT NIGHT 90 tablet 1    alendronate (FOSAMAX) 70 MG tablet TAKE 1 TABLET BY MOUTH ONE TIME PER WEEK 12 tablet 1    aspirin 81 MG EC tablet Take 81 mg by mouth 2 times daily (Patient not taking: Reported on 11/21/2022)      calcium carbonate-vitamin D (CALTRATE) 600-400 MG-UNIT TABS per tab Take 1 tablet by mouth daily (Patient not taking: Reported on 11/21/2022) No current facility-administered medications for this visit. PMH, Surgical Hx, Family Hx, and Social Hx reviewed and updated. Health Maintenance reviewed. Objective    Vitals:    11/21/22 0928 11/21/22 1017   BP: (!) 150/68 (!) 140/70   Pulse: 76    Temp: 97.7 °F (36.5 °C)    SpO2: 99%    Weight: 162 lb (73.5 kg)    Height: 5' 6\" (1.676 m)        Physical Exam  Vitals reviewed. Constitutional:       General: She is not in acute distress. HENT:      Head: Normocephalic and atraumatic. Eyes:      Conjunctiva/sclera: Conjunctivae normal.   Neck:      Thyroid: No thyromegaly or thyroid tenderness. Cardiovascular:      Rate and Rhythm: Normal rate and regular rhythm. Heart sounds: No murmur heard. No friction rub. No gallop. Pulmonary:      Effort: Pulmonary effort is normal.      Breath sounds: Normal breath sounds. No wheezing, rhonchi or rales. Abdominal:      General: Bowel sounds are normal. There is no distension. Palpations: Abdomen is soft. Tenderness: There is no abdominal tenderness. Musculoskeletal:         General: No swelling or deformity. Cervical back: Normal range of motion and neck supple. Right lower leg: No edema. Left lower leg: No edema. Lymphadenopathy:      Cervical: No cervical adenopathy. Skin:     General: Skin is warm and dry. Findings: No rash. Neurological:      General: No focal deficit present. Mental Status: She is alert. Gait: Gait is intact. Psychiatric:         Mood and Affect: Mood normal.         Behavior: Behavior normal.          Assessment & Plan       1. Essential hypertension  Stable, chronic. Continue lisinopril 10 mg daily. Refill sent to the pharmacy. Check metabolic panel. Follow-up in 6 months.  - lisinopril (PRINIVIL;ZESTRIL) 10 MG tablet; TAKE 1 TABLET BY MOUTH DAILY  Dispense: 90 tablet; Refill: 1  - Comprehensive Metabolic Panel, Fasting; Future    2.  Hypothyroidism due to acquired atrophy of thyroid  Stable, chronic. Continue levothyroxine 75 mcg daily. Refill sent to pharmacy. Check TSH. Call with results when return. Adjust dose as needed. - levothyroxine (SYNTHROID) 75 MCG tablet; TAKE 1 TABLET BY MOUTH EVERY DAY  Dispense: 90 tablet; Refill: 1  - TSH with Reflex; Future    3. Mixed hyperlipidemia  Stable, chronic. Continue lovastatin 40 mg daily. Check lipid panel. Call with results when return. Continue to monitor. Follow-up as scheduled. - lovastatin (MEVACOR) 40 MG tablet; TAKE 1 TABLET BY MOUTH EVERY DAY AT NIGHT  Dispense: 90 tablet; Refill: 1  - Lipid, Fasting; Future    4. Need for influenza vaccination  Flu shot given  - Influenza, FLUAD, (age 72 y+), IM, Preservative Free, 0.5 mL    Orders Placed This Encounter   Procedures    Influenza, FLUAD, (age 72 y+), IM, Preservative Free, 0.5 mL    Tdap, BOOSTRIX, (age 8 yrs+), IM    Comprehensive Metabolic Panel, Fasting     Standing Status:   Future     Number of Occurrences:   1     Standing Expiration Date:   11/21/2023    Lipid, Fasting     Standing Status:   Future     Number of Occurrences:   1     Standing Expiration Date:   11/21/2023    TSH with Reflex     Standing Status:   Future     Number of Occurrences:   1     Standing Expiration Date:   11/21/2023       Orders Placed This Encounter   Medications    levothyroxine (SYNTHROID) 75 MCG tablet     Sig: TAKE 1 TABLET BY MOUTH EVERY DAY     Dispense:  90 tablet     Refill:  1     DX Code Needed  .     lisinopril (PRINIVIL;ZESTRIL) 10 MG tablet     Sig: TAKE 1 TABLET BY MOUTH DAILY     Dispense:  90 tablet     Refill:  1    lovastatin (MEVACOR) 40 MG tablet     Sig: TAKE 1 TABLET BY MOUTH EVERY DAY AT NIGHT     Dispense:  90 tablet     Refill:  1       Medications Discontinued During This Encounter   Medication Reason    lisinopril (PRINIVIL;ZESTRIL) 10 MG tablet REORDER    levothyroxine (SYNTHROID) 75 MCG tablet REORDER    lovastatin (MEVACOR) 40 MG tablet REORDER Return in about 6 months (around 5/21/2023). Reviewed with the patient: current clinical status,medications, activities and diet. Side effects, adverse effects of themedication prescribed today, as well as treatment plan/ rationale and result expectations have been discussed with the patient who expresses understanding and desires to proceed. Close follow up to evaluate treatment results and for coordination of care. I have reviewed the patient's medical history in detail and updated the computerized patient record. Please note, this report has been partially produced using speech recognition software and may cause  and /or contain errors related to that system including grammar, punctuation and spelling as well as words and phrases that may seem inappropriate. If there are questions or concerns please feel free to contact me to clarify.     Latoya Serrano, DO

## 2022-11-21 NOTE — RESULT ENCOUNTER NOTE
Please inform patient that her thyroid was normal.  Her lipid panel was normal.  Metabolic panel was normal.  Thanks

## 2022-11-22 ENCOUNTER — TELEPHONE (OUTPATIENT)
Dept: FAMILY MEDICINE CLINIC | Age: 82
End: 2022-11-22

## 2022-12-09 DIAGNOSIS — I10 ESSENTIAL HYPERTENSION: ICD-10-CM

## 2022-12-09 RX ORDER — LISINOPRIL 10 MG/1
TABLET ORAL
Qty: 90 TABLET | Refills: 1 | Status: SHIPPED | OUTPATIENT
Start: 2022-12-09

## 2022-12-09 NOTE — TELEPHONE ENCOUNTER
Rx requested:  Requested Prescriptions     Pending Prescriptions Disp Refills    lisinopril (PRINIVIL;ZESTRIL) 10 MG tablet [Pharmacy Med Name: LISINOPRIL 10 MG TABLET] 90 tablet 1     Sig: TAKE 1 TABLET BY MOUTH EVERY DAY         Last Office Visit:   11/21/2022      Next Visit Date:  Future Appointments   Date Time Provider Amy Rush   5/24/2023  9:00 AM Nicki Cook,  [address]   5/24/2023  9:15 AM Nicki Cook,  [address]

## 2023-02-27 ENCOUNTER — TELEPHONE (OUTPATIENT)
Dept: FAMILY MEDICINE CLINIC | Age: 83
End: 2023-02-27

## 2023-02-27 ENCOUNTER — OFFICE VISIT (OUTPATIENT)
Dept: FAMILY MEDICINE CLINIC | Age: 83
End: 2023-02-27

## 2023-02-27 VITALS
BODY MASS INDEX: 26.03 KG/M2 | TEMPERATURE: 97.4 F | WEIGHT: 162 LBS | RESPIRATION RATE: 15 BRPM | DIASTOLIC BLOOD PRESSURE: 72 MMHG | HEART RATE: 88 BPM | HEIGHT: 66 IN | OXYGEN SATURATION: 98 % | SYSTOLIC BLOOD PRESSURE: 146 MMHG

## 2023-02-27 DIAGNOSIS — U07.1 COVID-19: Primary | ICD-10-CM

## 2023-02-27 LAB
INFLUENZA A ANTIBODY: NEGATIVE
INFLUENZA B ANTIBODY: NEGATIVE
Lab: ABNORMAL
PERFORMING INSTRUMENT: ABNORMAL
QC PASS/FAIL: ABNORMAL
SARS-COV-2, POC: DETECTED

## 2023-02-27 SDOH — ECONOMIC STABILITY: FOOD INSECURITY: WITHIN THE PAST 12 MONTHS, THE FOOD YOU BOUGHT JUST DIDN'T LAST AND YOU DIDN'T HAVE MONEY TO GET MORE.: NEVER TRUE

## 2023-02-27 SDOH — ECONOMIC STABILITY: INCOME INSECURITY: HOW HARD IS IT FOR YOU TO PAY FOR THE VERY BASICS LIKE FOOD, HOUSING, MEDICAL CARE, AND HEATING?: NOT HARD AT ALL

## 2023-02-27 SDOH — ECONOMIC STABILITY: FOOD INSECURITY: WITHIN THE PAST 12 MONTHS, YOU WORRIED THAT YOUR FOOD WOULD RUN OUT BEFORE YOU GOT MONEY TO BUY MORE.: NEVER TRUE

## 2023-02-27 SDOH — ECONOMIC STABILITY: HOUSING INSECURITY
IN THE LAST 12 MONTHS, WAS THERE A TIME WHEN YOU DID NOT HAVE A STEADY PLACE TO SLEEP OR SLEPT IN A SHELTER (INCLUDING NOW)?: NO

## 2023-02-27 ASSESSMENT — PATIENT HEALTH QUESTIONNAIRE - PHQ9
SUM OF ALL RESPONSES TO PHQ9 QUESTIONS 1 & 2: 0
2. FEELING DOWN, DEPRESSED OR HOPELESS: 0
1. LITTLE INTEREST OR PLEASURE IN DOING THINGS: 0
SUM OF ALL RESPONSES TO PHQ QUESTIONS 1-9: 0

## 2023-02-27 NOTE — PROGRESS NOTES
Subjective:      Patient ID: Harper Joya is a 78 y.o. female who present today with:      Chief Complaint   Patient presents with    Post-COVID Symptoms     Patient presents today with a positive home Covid test since Saturday with a runny nose, and cough. Tested positive for covid yesterday on at home test  Thought it was allergies  No fever  Dry cough  No wheezing, SOB  Dayquil and cough drops  No GI symptoms  Had the first round of covid vaccines    Past Medical History:   Diagnosis Date    Allergic rhinitis     Carotid artery occlusion     Carpal tunnel syndrome, bilateral     Colitis, ischemic (HCC)     DJD (degenerative joint disease)     H/O: psoriasis     History of bad fall 2009    MULTIPLE CONTUSIONS    History of diverticulitis of colon     History of kidney stones     History of mammography, screening 2010    CAT 2    History of osteopenia     History of shingles     Hyperlipidemia     Hypertension     Hypothyroidism     Renal failure     MILD     Patient Active Problem List    Diagnosis Date Noted    Generalized arthritis 01/04/2018    Dyshidrotic eczema 05/21/2015    Hypovitaminosis D 05/06/2014    Anxiety 10/16/2012    Essential hypertension     Mixed hyperlipidemia     Hypothyroidism     Carpal tunnel syndrome, bilateral      Past Surgical History:   Procedure Laterality Date    BREAST SURGERY  80'S    R BREAST LUMPECTOMY    COLONOSCOPY  01/01/2010    SIGMOID DIVERTICULOSIS    HYSTERECTOMY (CERVIX STATUS UNKNOWN)  1976-PARTIAL    BENIGN TUMOR    JOINT REPLACEMENT Right 08/01/2012    right knee    JOINT REPLACEMENT Left 06/01/2021    left knee    KNEE ARTHROSCOPY  1992-RIGHT    TONSILLECTOMY      UPPER GASTROINTESTINAL ENDOSCOPY  01/01/2010    GEN Sherice Silk MUCOSAL ERYTHEMIA     Social History     Socioeconomic History    Marital status:       Spouse name: None    Number of children: None    Years of education: None    Highest education level: None   Tobacco Use    Smoking status: Never    Smokeless tobacco: Never   Substance and Sexual Activity    Alcohol use: No    Drug use: No    Sexual activity: Never     Social Determinants of Health     Financial Resource Strain: Low Risk     Difficulty of Paying Living Expenses: Not hard at all   Food Insecurity: No Food Insecurity    Worried About [address] in the Last Year: Never true    [facility] in the Last Year: Never true   Transportation Needs: No Transportation Needs    Lack of Transportation (Medical): No    Lack of Transportation (Non-Medical): No   Physical Activity: Inactive    Days of Exercise per Week: 0 days    Minutes of Exercise per Session: 0 min   Housing Stability: Unknown    Unstable Housing in the Last Year: No     Current Outpatient Medications on File Prior to Visit   Medication Sig Dispense Refill    lisinopril (PRINIVIL;ZESTRIL) 10 MG tablet TAKE 1 TABLET BY MOUTH EVERY DAY 90 tablet 1    levothyroxine (SYNTHROID) 75 MCG tablet TAKE 1 TABLET BY MOUTH EVERY DAY 90 tablet 1    lovastatin (MEVACOR) 40 MG tablet TAKE 1 TABLET BY MOUTH EVERY DAY AT NIGHT 90 tablet 1    alendronate (FOSAMAX) 70 MG tablet TAKE 1 TABLET BY MOUTH ONE TIME PER WEEK 12 tablet 1    aspirin 81 MG EC tablet Take 81 mg by mouth in the morning and 81 mg in the evening. calcium carbonate-vitamin D (CALTRATE) 600-400 MG-UNIT TABS per tab Take 1 tablet by mouth daily       No current facility-administered medications on file prior to visit. Allergies   Allergen Reactions    Seasonal       Review of Systems   Constitutional:  Positive for activity change, appetite change, chills and fatigue. Negative for diaphoresis and fever. HENT:  Positive for congestion, postnasal drip and rhinorrhea. Negative for ear pain, mouth sores, sinus pressure, sinus pain, sore throat and trouble swallowing. Eyes:  Negative for pain, discharge, redness and itching. Respiratory:  Positive for cough.  Negative for chest tightness, shortness of breath and wheezing. Cardiovascular:  Negative for chest pain. Gastrointestinal:  Negative for abdominal pain, constipation, diarrhea, nausea and vomiting. Musculoskeletal:  Negative for arthralgias and myalgias. Skin:  Negative for rash. Neurological:  Negative for seizures, syncope and headaches. Objective:      Vitals:    02/27/23 1553 02/27/23 1600   BP: (!) 146/70 (!) 146/72   Site: Right Upper Arm Right Upper Arm   Position: Sitting Sitting   Cuff Size: Medium Adult Medium Adult   Pulse: 88    Resp: 15    Temp: 97.4 °F (36.3 °C)    TempSrc: Temporal    SpO2: 98%    Weight: 162 lb (73.5 kg)    Height: 5' 6\" (1.676 m)      Physical Exam  Constitutional:       General: She is not in acute distress. Appearance: Normal appearance. She is ill-appearing. Eyes:      General: Lids are normal. Vision grossly intact. Extraocular Movements: Extraocular movements intact. Conjunctiva/sclera: Conjunctivae normal.      Pupils: Pupils are equal, round, and reactive to light. Cardiovascular:      Rate and Rhythm: Normal rate and regular rhythm. Heart sounds: Normal heart sounds. Pulmonary:      Effort: Pulmonary effort is normal.      Breath sounds: Normal breath sounds and air entry. Lymphadenopathy:      Head:      Right side of head: No submandibular or tonsillar adenopathy. Left side of head: No submandibular or tonsillar adenopathy. Cervical: Cervical adenopathy present. Skin:     General: Skin is warm and dry. Findings: No rash. Neurological:      Mental Status: She is alert. Assessment & Plan:   Ezella Primrose was seen today for post-covid symptoms. Diagnoses and all orders for this visit:    COVID-19  -     POCT Influenza A/B  -     POCT COVID-19, Antigen  -     nirmatrelvir/ritonavir 300/100 (PAXLOVID) 20 x 150 MG & 10 x 100MG TBPK; Take 3 tablets (two 150 mg nirmatrelvir and one 100 mg ritonavir tablets) by mouth every 12 hours for 5 days.     Side effects, adverse effects of the medication prescribed today, as well as treatment plan/ rationale and result expectations have been discussed with the patient who expresses understanding and desires to proceed. Close follow up to evaluate treatment results and for coordination of care. I have reviewed the patient's medical history in detail and updated the computerized patient record. As always, patient is advised that if symptoms worsen in any way they will proceed to the nearest emergency room.      Follow up in 48-72 hours if symptoms persist or worsen and as needed    Jamila Britt, BO - CNP

## 2023-03-02 ASSESSMENT — ENCOUNTER SYMPTOMS
ABDOMINAL PAIN: 0
RHINORRHEA: 1
VOMITING: 0
SHORTNESS OF BREATH: 0
SORE THROAT: 0
COUGH: 1
TROUBLE SWALLOWING: 0
SINUS PRESSURE: 0
CONSTIPATION: 0
CHEST TIGHTNESS: 0
WHEEZING: 0
SINUS PAIN: 0
EYE DISCHARGE: 0
EYE REDNESS: 0
EYE PAIN: 0
EYE ITCHING: 0
NAUSEA: 0
DIARRHEA: 0

## 2023-03-02 ASSESSMENT — VISUAL ACUITY: OU: 1

## 2023-04-07 NOTE — PROGRESS NOTES
intolerance, polydipsia and polyphagia. No unintended weight loss or weight gain. Neuro/Psychiatric: Negative for gait disturbance. Negative for psychiatric symptoms. Dermatologic: Negative for pruritus and rash. Musculoskeletal: positive for bone/joint symptoms. No numbness or tingling. No loss of function. Hematology: Negative for bleeding and easy bruising. Immunology:  Negative for environmental allergies and food allergies. Physical Exam    Patient's medication, allergies, past medical, surgical, social and family histories were reviewed and updated as appropriate. PHYSICAL EXAM   General Appearance:  Alert oriented pleasant cooperative with the exam in no acute distress. HEENT: Eyes clear nonicteric facial muscles symmetrical.  Hearing good. Neck: Soft nontender no adenopathy or masses. Patient had a carotid ultrasound done in October 2017 and was negative for any significant blockage this was explained to the patient. Lungs: Clear to auscultation no wheezes rhonchi or rales  Heart: Regular rate and rhythm without murmurs rubs or gallops  Extremities: No rashes or edema. Neurologically intact. Assessment:  1. Essential hypertension  lisinopril (PRINIVIL;ZESTRIL) 5 MG tablet  Under good control   2. Hypothyroidism due to acquired atrophy of thyroid  levothyroxine (SYNTHROID) 75 MCG tablet    TSH without Reflex    T4, Free   3. Hypovitaminosis D  Vitamin D 25 Hydroxy   4. Mixed hyperlipidemia  Comprehensive Metabolic Panel    Lipid Panel   5. Generalized arthritis  ibuprofen (ADVIL;MOTRIN) 600 MG tablet   6. Screening mammogram, encounter for  CODIE DIGITAL SCREEN W CAD BILATERAL   7.  Need for influenza vaccination  INFLUENZA, HIGH DOSE, 65 YRS +, IM, PF, PREFILL SYR, 0.5ML (FLUZONE HD)               Plan:  Current Outpatient Prescriptions   Medication Sig Dispense Refill    ibuprofen (ADVIL;MOTRIN) 600 MG tablet Take 1 tablet by mouth every 6 hours as needed for Pain 90 tablet 3    EVERY DAY     Dispense:  90 tablet     Refill:  3    lisinopril (PRINIVIL;ZESTRIL) 5 MG tablet     Sig: TAKE 1 TABLET BY MOUTH DAILY     Dispense:  90 tablet     Refill:  3             Return in about 6 months (around 7/4/2018).     Dr. Akin Fang      1/4/18  11:43 AM [facility]

## 2023-04-28 RX ORDER — ALENDRONATE SODIUM 70 MG/1
TABLET ORAL
Qty: 12 TABLET | Refills: 1 | Status: SHIPPED | OUTPATIENT
Start: 2023-04-28

## 2023-06-02 DIAGNOSIS — E03.4 HYPOTHYROIDISM DUE TO ACQUIRED ATROPHY OF THYROID: ICD-10-CM

## 2023-06-02 RX ORDER — LEVOTHYROXINE SODIUM 0.07 MG/1
TABLET ORAL
Qty: 90 TABLET | Refills: 1 | Status: SHIPPED | OUTPATIENT
Start: 2023-06-02

## 2023-06-02 NOTE — TELEPHONE ENCOUNTER
Please approve or deny request. Thank you! Rx requested:  Requested Prescriptions     Pending Prescriptions Disp Refills    levothyroxine (SYNTHROID) 75 MCG tablet [Pharmacy Med Name: LEVOTHYROXINE 75 MCG TABLET] 90 tablet 1     Sig: TAKE 1 TABLET BY MOUTH EVERY DAY         Last Office Visit:   11/21/2022      Next Visit Date:  No future appointments.

## 2023-07-12 DIAGNOSIS — E78.2 MIXED HYPERLIPIDEMIA: ICD-10-CM

## 2023-07-12 RX ORDER — LOVASTATIN 40 MG/1
TABLET ORAL
Qty: 90 TABLET | Refills: 1 | Status: SHIPPED | OUTPATIENT
Start: 2023-07-12

## 2023-07-12 NOTE — TELEPHONE ENCOUNTER
----- Message from Willy Rubio sent at 7/12/2023 10:28 AM EDT -----  Subject: Refill Request    QUESTIONS  Name of Medication? lovastatin (MEVACOR) 40 MG tablet  Patient-reported dosage and instructions? 1 at night  How many days do you have left? 4  Preferred Pharmacy? [facility]ACY #[id number]  Pharmacy phone number (if available)? [phone]  Additional Information for Provider? Yris Guidry has an appointment scheduled   on 07/19/2023 and will need a refill of the lovastatin 40mg tablets she   takes one at night and has 3 left and would like them sent to [facility]   Pharmacy. She can be reached at [phone] ok to leave a message  ---------------------------------------------------------------------------  --------------  [address] [place]  What is the best way for the office to contact you? OK to leave message on   voicemail  Preferred Call Back Phone Number? [phone]  ---------------------------------------------------------------------------  --------------  SCRIPT ANSWERS  Relationship to Patient?  Self

## 2023-07-19 ENCOUNTER — OFFICE VISIT (OUTPATIENT)
Dept: FAMILY MEDICINE CLINIC | Age: 83
End: 2023-07-19
Payer: MEDICARE

## 2023-07-19 VITALS
WEIGHT: 163 LBS | HEART RATE: 68 BPM | DIASTOLIC BLOOD PRESSURE: 62 MMHG | TEMPERATURE: 97.8 F | OXYGEN SATURATION: 98 % | HEIGHT: 62 IN | BODY MASS INDEX: 30 KG/M2 | SYSTOLIC BLOOD PRESSURE: 130 MMHG

## 2023-07-19 VITALS
BODY MASS INDEX: 30 KG/M2 | OXYGEN SATURATION: 98 % | HEIGHT: 62 IN | HEART RATE: 68 BPM | DIASTOLIC BLOOD PRESSURE: 62 MMHG | TEMPERATURE: 97.8 F | WEIGHT: 163 LBS | SYSTOLIC BLOOD PRESSURE: 130 MMHG

## 2023-07-19 DIAGNOSIS — E03.4 HYPOTHYROIDISM DUE TO ACQUIRED ATROPHY OF THYROID: ICD-10-CM

## 2023-07-19 DIAGNOSIS — E78.2 MIXED HYPERLIPIDEMIA: ICD-10-CM

## 2023-07-19 DIAGNOSIS — M81.0 OSTEOPOROSIS, UNSPECIFIED OSTEOPOROSIS TYPE, UNSPECIFIED PATHOLOGICAL FRACTURE PRESENCE: ICD-10-CM

## 2023-07-19 DIAGNOSIS — I10 ESSENTIAL HYPERTENSION: Primary | ICD-10-CM

## 2023-07-19 DIAGNOSIS — M19.90 GENERALIZED ARTHRITIS: ICD-10-CM

## 2023-07-19 DIAGNOSIS — Z00.00 MEDICARE ANNUAL WELLNESS VISIT, SUBSEQUENT: Primary | ICD-10-CM

## 2023-07-19 PROCEDURE — G0439 PPPS, SUBSEQ VISIT: HCPCS | Performed by: STUDENT IN AN ORGANIZED HEALTH CARE EDUCATION/TRAINING PROGRAM

## 2023-07-19 PROCEDURE — 3075F SYST BP GE 130 - 139MM HG: CPT | Performed by: STUDENT IN AN ORGANIZED HEALTH CARE EDUCATION/TRAINING PROGRAM

## 2023-07-19 PROCEDURE — 99214 OFFICE O/P EST MOD 30 MIN: CPT | Performed by: STUDENT IN AN ORGANIZED HEALTH CARE EDUCATION/TRAINING PROGRAM

## 2023-07-19 PROCEDURE — 1123F ACP DISCUSS/DSCN MKR DOCD: CPT | Performed by: STUDENT IN AN ORGANIZED HEALTH CARE EDUCATION/TRAINING PROGRAM

## 2023-07-19 PROCEDURE — 3078F DIAST BP <80 MM HG: CPT | Performed by: STUDENT IN AN ORGANIZED HEALTH CARE EDUCATION/TRAINING PROGRAM

## 2023-07-19 RX ORDER — ALENDRONATE SODIUM 70 MG/1
TABLET ORAL
Qty: 12 TABLET | Refills: 1 | Status: SHIPPED | OUTPATIENT
Start: 2023-07-19

## 2023-07-19 RX ORDER — LISINOPRIL 10 MG/1
TABLET ORAL
Qty: 90 TABLET | Refills: 1 | Status: SHIPPED | OUTPATIENT
Start: 2023-07-19

## 2023-07-19 ASSESSMENT — ENCOUNTER SYMPTOMS
NAUSEA: 0
SHORTNESS OF BREATH: 0
WHEEZING: 0
ABDOMINAL PAIN: 0
EYE DISCHARGE: 0
SORE THROAT: 0
RHINORRHEA: 0
COUGH: 0
DIARRHEA: 0
VOMITING: 0

## 2023-07-19 ASSESSMENT — LIFESTYLE VARIABLES
HOW OFTEN DO YOU HAVE A DRINK CONTAINING ALCOHOL: NEVER
HOW MANY STANDARD DRINKS CONTAINING ALCOHOL DO YOU HAVE ON A TYPICAL DAY: PATIENT DOES NOT DRINK

## 2023-07-19 ASSESSMENT — PATIENT HEALTH QUESTIONNAIRE - PHQ9
SUM OF ALL RESPONSES TO PHQ QUESTIONS 1-9: 0
SUM OF ALL RESPONSES TO PHQ9 QUESTIONS 1 & 2: 0
SUM OF ALL RESPONSES TO PHQ QUESTIONS 1-9: 0
2. FEELING DOWN, DEPRESSED OR HOPELESS: 0
SUM OF ALL RESPONSES TO PHQ QUESTIONS 1-9: 0
SUM OF ALL RESPONSES TO PHQ QUESTIONS 1-9: 0
1. LITTLE INTEREST OR PLEASURE IN DOING THINGS: 0

## 2023-07-19 NOTE — PROGRESS NOTES
Subjective  Sonia Porter, 78 y.o. female presents today with:  Chief Complaint   Patient presents with    6 Month Follow-Up     Would like to know when she should have lab work done again. Hypertension     Does not check BP at home. Denies any chest pains, heart palpitations, headaches, dizziness or SOB. Depression     Feels mood is stable. Hypothyroidism    Hyperlipidemia    Other     Would like new Rx for handicap placard. Patient with 6-month follow-up appointment. Patient with essential hypertension. She is normotensive in the office today. She does not check blood pressure at home. She denies any chest pain, shortness of breath, palpitations, headaches or dizziness. She is currently on lisinopril 10 mg daily. Tolerating well. No cough. Patient with mixed hyperlipidemia. She is on lovastatin 40 mg daily. Tolerating well. No side effects medication. No muscle aches or pains. Patient with hypothyroidism. She is on levothyroxine 75 mcg daily. Tolerating well. No changes to hair skin or nails. No heat or cold intolerance. Patient also with arthritis. Requesting an updated handicap placard. She has history of knee replacements as well. Patient also with osteoporosis. She is on alendronate 70 mg weekly. Tolerating well. No side effects to medication. She has no other concerns at this time. Review of Systems   Constitutional:  Negative for chills, fatigue, fever and unexpected weight change. HENT:  Negative for congestion, rhinorrhea and sore throat. Eyes:  Negative for discharge. Respiratory:  Negative for cough, shortness of breath and wheezing. Cardiovascular:  Negative for chest pain, palpitations and leg swelling. Gastrointestinal:  Negative for abdominal pain, diarrhea, nausea and vomiting. Genitourinary:  Negative for difficulty urinating. Musculoskeletal:  Negative for arthralgias and joint swelling. Skin:  Negative for rash.

## 2023-09-06 NOTE — PATIENT INSTRUCTIONS
Advance Directives: Care Instructions  Overview  An advance directive is a legal way to state your wishes at the end of your life. It tells your family and your doctor what to do if you can't say what you want. There are two main types of advance directives. You can change them any time your wishes change. Living will. This form tells your family and your doctor your wishes about life support and other treatment. The form is also called a declaration. Medical power of . This form lets you name a person to make treatment decisions for you when you can't speak for yourself. This person is called a health care agent (health care proxy, health care surrogate). The form is also called a durable power of  for health care. If you do not have an advance directive, decisions about your medical care may be made by a family member, or by a doctor or a  who doesn't know you. It may help to think of an advance directive as a gift to the people who care for you. If you have one, they won't have to make tough decisions by themselves. Follow-up care is a key part of your treatment and safety. Be sure to make and go to all appointments, and call your doctor if you are having problems. It's also a good idea to know your test results and keep a list of the medicines you take. What should you include in an advance directive? Many states have a unique advance directive form. (It may ask you to address specific issues.) Or you might use a universal form that's approved by many states. If your form doesn't tell you what to address, it may be hard to know what to include in your advance directive. Use the questions below to help you get started. · Who do you want to make decisions about your medical care if you are not able to? · What life-support measures do you want if you have a serious illness that gets worse over time or can't be cured? · What are you most afraid of that might happen? Please advise- Patient states he has been taking Valsartan 160mg daily. BP is still elevated 160/110, 150/110. The lowest it has been was 140/96. Denies any symptoms (no chest pain, dizziness, headaches, SOB, vision changes).  Patient needs BP managed for CDL license.   (Maybe you're afraid of having pain, losing your independence, or being kept alive by machines.)  · Where would you prefer to die? (Your home? A hospital? A nursing home?)  · Do you want to donate your organs when you die? · Do you want certain Hoahaoism practices performed before you die? When should you call for help? Be sure to contact your doctor if you have any questions. Where can you learn more? Go to [email or web address] org and sign in to your elicit account. Enter R264 in the Operax box to learn more about \"Advance Directives: Care Instructions. \"     If you do not have an account, please click on the \"Sign Up Now\" link. Current as of: July 17, 2020               Content Version: 12.8  © 2006-2021 Healthwise, [employer]. Care instructions adapted under license by [facility] ([facility]). If you have questions about a medical condition or this instruction, always ask your healthcare professional. [place] [address] any warranty or liability for your use of this information. Learning About Medical Power of   What is a medical power of ? A medical power of , also called a durable power of  for health care, is one type of the legal forms called advance directives. It lets you name the person you want to make treatment decisions for you if you can't speak or decide for yourself. The person you choose is called your health care agent. This person is also called a health care proxy or health care surrogate. A medical power of  may be called something else in your state. How do you choose a health care agent? Choose your health care agent carefully. This person may or may not be a family member. Talk to the person before you make your final decision. Make sure he or she is comfortable with this responsibility. It's a good idea to choose someone who:  · Is at least 25years old.   · Knows you well and understands what makes life meaningful for you. · Understands your Sikh and moral values. · Will do what you want, not what he or she wants. · Will be able to make difficult choices at a stressful time. · Will be able to refuse or stop treatment, if that is what you would want, even if you could die. · Will be firm and confident with health professionals if needed. · Will ask questions to get needed information. · Lives near you or agrees to travel to you if needed. Your family may help you make medical decisions while you can still be part of that process. But it's important to choose one person to be your health care agent in case you aren't able to make decisions for yourself. If you don't fill out the legal form and name a health care agent, the decisions your family can make may be limited. A health care agent may be called something else in your state. Who will make decisions for you if you don't have a health care agent? If you don't have a health care agent or a living will, you may not get the care you want. Decisions may be made by family members who disagree about your medical care. Or decisions may be made by a medical professional who doesn't know you well. In some cases, a  makes the decisions. When you name a health care agent, it is very clear who has the power to make health decisions for you. How do you name a health care agent? You name your health care agent on a legal form. This form is usually called a medical power of . Ask your hospital, state bar association, or office on aging where to find these forms. You must sign the form to make it legal. Some states require you to get the form notarized. This means that a person called a  watches you sign the form and then he or she signs the form. Some states also require that two or more witnesses sign the form. Be sure to tell your family members and doctors who your health care agent is.   Where can you learn more? Go to https://[email or web address] org and sign in to your PlayDo account. Enter [id number] in the [facility] box to learn more about \"Learning About [address]. \"     If you do not have an account, please click on the \"Sign Up Now\" link. Current as of: July 17, 2020               Content Version: 12.8  © 2006-2021 [employer]. Care instructions adapted under license by [facility] ([facility]). If you have questions about a medical condition or this instruction, always ask your healthcare professional. [address] any warranty or liability for your use of this information. Learning About Living Violeta Mendoza  What is a living will? A living will, also called a declaration, is a legal form. It tells your family and your doctor your wishes when you can't speak for yourself. It's used by the health professionals who will treat you as you near the end of your life or if you get seriously hurt or ill. If you put your wishes in writing, your loved ones and others will know what kind of care you want. They won't need to guess. This can ease your mind and be helpful to others. And you can change or cancel your living will at any time. A living will is not the same as an estate or property will. An estate will explains what you want to happen with your money and property after you die. How do you use it? A living will is used to describe the kinds of treatment or life support you want as you near the end of your life or if you get seriously hurt or ill. Keep these facts in mind about living nolan. · Your living will is used only if you can't speak or make decisions for yourself. Most often, one or more doctors must certify that you can't speak or decide for yourself before your living will takes effect. · If you get better and can speak for yourself again, you can accept or refuse any treatment.  It doesn't matter what you said in your living will. · Some states may limit your right to refuse treatment in certain cases. For example, you may need to clearly state in your living will that you don't want artificial hydration and nutrition, such as being fed through a tube. Is a living will a legal document? A living will is a legal document. Each state has its own laws about living nolan. And a living will may be called something else in your state. Here are some things to know about living nolan. · You don't need an  to complete a living will. But legal advice can be helpful if your state's laws are unclear. It can also help if your health history is complicated or your family can't agree on what should be in your living will. · You can change your living will at any time. Some people find that their wishes about end-of-life care change as their health changes. If you make big changes to your living will, complete a new form. · If you move to another state, make sure that your living will is legal in the state where you now live. In most cases, doctors will respect your wishes even if you have a form from a different state. · You might use a universal form that has been approved by many states. This kind of form can sometimes be filled out and stored online. Your digital copy will then be available wherever you have a connection to the internet. The doctors and nurses who need to treat you can find it right away. · Your state may offer an online registry. This is another place where you can store your living will online. · It's a good idea to get your living will notarized. This means using a person called a  to watch two people sign, or witness, your living will. What should you know when you create a living will? Here are some questions to ask yourself as you make your living will:  · Do you know enough about life support methods that might be used?  If not, talk to your doctor so you know what might be done if you Personalized Preventive Plan for Katiana Rivera - 4/28/2021  Medicare offers a range of preventive health benefits. Some of the tests and screenings are paid in full while other may be subject to a deductible, co-insurance, and/or copay. Some of these benefits include a comprehensive review of your medical history including lifestyle, illnesses that may run in your family, and various assessments and screenings as appropriate. After reviewing your medical record and screening and assessments performed today your provider may have ordered immunizations, labs, imaging, and/or referrals for you. A list of these orders (if applicable) as well as your Preventive Care list are included within your After Visit Summary for your review. Other Preventive Recommendations:    · A preventive eye exam performed by an eye specialist is recommended every 1-2 years to screen for glaucoma; cataracts, macular degeneration, and other eye disorders. · A preventive dental visit is recommended every 6 months. · Try to get at least 150 minutes of exercise per week or 10,000 steps per day on a pedometer . · Order or download the FREE \"Exercise & Physical Activity: Your Everyday Guide\" from [employer]. Call 6-963.396.1058 or search [employer] online. · You need 2695-9645 mg of calcium and 8246-6159 IU of vitamin D per day. It is possible to meet your calcium requirement with diet alone, but a vitamin D supplement is usually necessary to meet this goal.  · When exposed to the sun, use a sunscreen that protects against both UVA and UVB radiation with an SPF of 30 or greater. Reapply every 2 to 3 hours or after sweating, drying off with a towel, or swimming. · Always wear a seat belt when traveling in a car. Always wear a helmet when riding a bicycle or motorcycle.

## 2023-11-20 DIAGNOSIS — E03.4 HYPOTHYROIDISM DUE TO ACQUIRED ATROPHY OF THYROID: ICD-10-CM

## 2023-11-20 RX ORDER — LEVOTHYROXINE SODIUM 0.07 MG/1
TABLET ORAL
Qty: 90 TABLET | Refills: 1 | Status: SHIPPED | OUTPATIENT
Start: 2023-11-20

## 2023-11-20 NOTE — TELEPHONE ENCOUNTER
Future Appointments    Encounter Information    Provider Department Appt Notes   1/19/2024 [facility] 6 month follow up     Past Visits    Date Provider Specialty Visit Type Primary Dx   07/19/2023 Carry Stalling DO Family Medicine Office Visit Medicare annual wellness visit, subsequent

## 2024-03-28 DIAGNOSIS — E78.2 MIXED HYPERLIPIDEMIA: ICD-10-CM

## 2024-03-29 RX ORDER — LOVASTATIN 40 MG/1
TABLET ORAL
Qty: 90 TABLET | Refills: 1 | Status: SHIPPED | OUTPATIENT
Start: 2024-03-29

## 2024-04-02 DIAGNOSIS — I10 ESSENTIAL HYPERTENSION: ICD-10-CM

## 2024-04-02 RX ORDER — LISINOPRIL 10 MG/1
TABLET ORAL
Qty: 90 TABLET | Refills: 1 | Status: SHIPPED | OUTPATIENT
Start: 2024-04-02

## 2024-04-02 NOTE — TELEPHONE ENCOUNTER
patient requesting medication refill. Please approve or deny this request.    Rx requested:  Requested Prescriptions     Pending Prescriptions Disp Refills    lisinopril (PRINIVIL;ZESTRIL) 10 MG tablet 90 tablet 1     Sig: TAKE 1 TABLET BY MOUTH EVERY DAY         Last Office Visit:   7/19/2023      Next Visit Date:  Future Appointments   Date Time Provider Department Center   4/9/2024 10:30 AM Tamika Sharp DO [facility] Latesha Munoz

## 2024-04-02 NOTE — TELEPHONE ENCOUNTER
patient requesting medication refill. Please approve or deny this request.    Rx requested:  Requested Prescriptions      No prescriptions requested or ordered in this encounter         Last Office Visit:   7/19/2023      Next Visit Date:  Future Appointments   Date Time Provider Department Center   4/9/2024 10:30 AM Tamika Sharp DO [facility] Latesha Munoz

## 2024-04-05 DIAGNOSIS — E03.4 HYPOTHYROIDISM DUE TO ACQUIRED ATROPHY OF THYROID: ICD-10-CM

## 2024-04-05 DIAGNOSIS — E78.2 MIXED HYPERLIPIDEMIA: ICD-10-CM

## 2024-04-05 DIAGNOSIS — I10 ESSENTIAL HYPERTENSION: ICD-10-CM

## 2024-04-05 LAB
ALBUMIN SERPL-MCNC: 4.6 G/DL (ref 3.5–4.6)
ALP SERPL-CCNC: 59 U/L (ref 40–130)
ALT SERPL-CCNC: 10 U/L (ref 0–33)
ANION GAP SERPL CALCULATED.3IONS-SCNC: 13 MEQ/L (ref 9–15)
AST SERPL-CCNC: 21 U/L (ref 0–35)
BILIRUB SERPL-MCNC: 0.6 MG/DL (ref 0.2–0.7)
BUN SERPL-MCNC: 24 MG/DL (ref 8–23)
CALCIUM SERPL-MCNC: 9.4 MG/DL (ref 8.5–9.9)
CHLORIDE SERPL-SCNC: 103 MEQ/L (ref 95–107)
CHOLEST SERPL-MCNC: 173 MG/DL (ref 0–199)
CO2 SERPL-SCNC: 25 MEQ/L (ref 20–31)
CREAT SERPL-MCNC: 1.03 MG/DL (ref 0.5–0.9)
GLOBULIN SER CALC-MCNC: 2.6 G/DL (ref 2.3–3.5)
GLUCOSE FASTING: 97 MG/DL (ref 70–99)
HDLC SERPL-MCNC: 70 MG/DL (ref 40–59)
LDL CHOLESTEROL CALCULATED: 84 MG/DL (ref 0–129)
POTASSIUM SERPL-SCNC: 4.4 MEQ/L (ref 3.4–4.9)
PROT SERPL-MCNC: 7.2 G/DL (ref 6.3–8)
SODIUM SERPL-SCNC: 141 MEQ/L (ref 135–144)
TRIGLYCERIDE, FASTING: 96 MG/DL (ref 0–150)
TSH REFLEX: 1.39 UIU/ML (ref 0.44–3.86)

## 2024-04-09 ENCOUNTER — OFFICE VISIT (OUTPATIENT)
Dept: FAMILY MEDICINE CLINIC | Age: 84
End: 2024-04-09
Payer: MEDICARE

## 2024-04-09 VITALS
SYSTOLIC BLOOD PRESSURE: 124 MMHG | DIASTOLIC BLOOD PRESSURE: 70 MMHG | BODY MASS INDEX: 31.1 KG/M2 | WEIGHT: 169 LBS | HEART RATE: 75 BPM | OXYGEN SATURATION: 99 % | HEIGHT: 62 IN | TEMPERATURE: 97.5 F

## 2024-04-09 VITALS
TEMPERATURE: 97.5 F | DIASTOLIC BLOOD PRESSURE: 70 MMHG | WEIGHT: 169 LBS | SYSTOLIC BLOOD PRESSURE: 124 MMHG | OXYGEN SATURATION: 99 % | HEIGHT: 62 IN | HEART RATE: 75 BPM | BODY MASS INDEX: 31.1 KG/M2

## 2024-04-09 DIAGNOSIS — E03.4 HYPOTHYROIDISM DUE TO ACQUIRED ATROPHY OF THYROID: ICD-10-CM

## 2024-04-09 DIAGNOSIS — Z00.00 MEDICARE ANNUAL WELLNESS VISIT, SUBSEQUENT: Primary | ICD-10-CM

## 2024-04-09 DIAGNOSIS — I10 ESSENTIAL HYPERTENSION: Primary | ICD-10-CM

## 2024-04-09 DIAGNOSIS — E78.2 MIXED HYPERLIPIDEMIA: ICD-10-CM

## 2024-04-09 DIAGNOSIS — R79.89 ELEVATED SERUM CREATININE: ICD-10-CM

## 2024-04-09 DIAGNOSIS — M81.0 OSTEOPOROSIS, UNSPECIFIED OSTEOPOROSIS TYPE, UNSPECIFIED PATHOLOGICAL FRACTURE PRESENCE: ICD-10-CM

## 2024-04-09 PROCEDURE — 99214 OFFICE O/P EST MOD 30 MIN: CPT | Performed by: STUDENT IN AN ORGANIZED HEALTH CARE EDUCATION/TRAINING PROGRAM

## 2024-04-09 PROCEDURE — 3074F SYST BP LT 130 MM HG: CPT | Performed by: STUDENT IN AN ORGANIZED HEALTH CARE EDUCATION/TRAINING PROGRAM

## 2024-04-09 PROCEDURE — 1123F ACP DISCUSS/DSCN MKR DOCD: CPT | Performed by: STUDENT IN AN ORGANIZED HEALTH CARE EDUCATION/TRAINING PROGRAM

## 2024-04-09 PROCEDURE — 3078F DIAST BP <80 MM HG: CPT | Performed by: STUDENT IN AN ORGANIZED HEALTH CARE EDUCATION/TRAINING PROGRAM

## 2024-04-09 PROCEDURE — G0439 PPPS, SUBSEQ VISIT: HCPCS | Performed by: STUDENT IN AN ORGANIZED HEALTH CARE EDUCATION/TRAINING PROGRAM

## 2024-04-09 RX ORDER — LEVOTHYROXINE SODIUM 0.07 MG/1
75 TABLET ORAL DAILY
Qty: 90 TABLET | Refills: 1 | Status: SHIPPED | OUTPATIENT
Start: 2024-04-09

## 2024-04-09 RX ORDER — ALENDRONATE SODIUM 70 MG/1
TABLET ORAL
Qty: 12 TABLET | Refills: 1 | Status: SHIPPED | OUTPATIENT
Start: 2024-04-09

## 2024-04-09 SDOH — ECONOMIC STABILITY: FOOD INSECURITY: WITHIN THE PAST 12 MONTHS, YOU WORRIED THAT YOUR FOOD WOULD RUN OUT BEFORE YOU GOT MONEY TO BUY MORE.: NEVER TRUE

## 2024-04-09 SDOH — ECONOMIC STABILITY: FOOD INSECURITY: WITHIN THE PAST 12 MONTHS, THE FOOD YOU BOUGHT JUST DIDN'T LAST AND YOU DIDN'T HAVE MONEY TO GET MORE.: NEVER TRUE

## 2024-04-09 SDOH — ECONOMIC STABILITY: INCOME INSECURITY: HOW HARD IS IT FOR YOU TO PAY FOR THE VERY BASICS LIKE FOOD, HOUSING, MEDICAL CARE, AND HEATING?: NOT VERY HARD

## 2024-04-09 ASSESSMENT — LIFESTYLE VARIABLES
HOW MANY STANDARD DRINKS CONTAINING ALCOHOL DO YOU HAVE ON A TYPICAL DAY: PATIENT DOES NOT DRINK
HOW OFTEN DO YOU HAVE A DRINK CONTAINING ALCOHOL: NEVER

## 2024-04-09 ASSESSMENT — PATIENT HEALTH QUESTIONNAIRE - PHQ9
2. FEELING DOWN, DEPRESSED OR HOPELESS: NOT AT ALL
SUM OF ALL RESPONSES TO PHQ9 QUESTIONS 1 & 2: 0
1. LITTLE INTEREST OR PLEASURE IN DOING THINGS: NOT AT ALL
SUM OF ALL RESPONSES TO PHQ QUESTIONS 1-9: 0

## 2024-04-09 ASSESSMENT — ENCOUNTER SYMPTOMS
COUGH: 0
NAUSEA: 0
DIARRHEA: 0
BACK PAIN: 0
ABDOMINAL PAIN: 0
SHORTNESS OF BREATH: 0
WHEEZING: 0
ABDOMINAL DISTENTION: 0
VOMITING: 0

## 2024-04-09 NOTE — PROGRESS NOTES
Objective   Vitals:    04/09/24 1047   BP: 124/70   Pulse: 75   Temp: 97.5 °F (36.4 °C)   SpO2: 99%   Weight: 76.7 kg (169 lb)   Height: 1.562 m (5' 1.5\")      Body mass index is 31.42 kg/m².               Allergies   Allergen Reactions    Seasonal      Prior to Visit Medications    Medication Sig Taking? Authorizing Provider   alendronate (FOSAMAX) 70 MG tablet TAKE 1 TABLET BY MOUTH ONE TIME PER WEEK  Tamika Sharp DO   levothyroxine (SYNTHROID) 75 MCG tablet Take 1 tablet by mouth daily  Tamika Sharp DO   lisinopril (PRINIVIL;ZESTRIL) 10 MG tablet TAKE 1 TABLET BY MOUTH EVERY DAY  Tamika Sharp DO   lovastatin (MEVACOR) 40 MG tablet TAKE 1 TABLET BY MOUTH EVERY DAY AT NIGHT  Tamika Sharp DO   Handicap Placard MISC by Does not apply route Start 7/20/23 - 7/20/28  Tamika Sharp DO   aspirin 81 MG EC tablet Take 1 tablet by mouth in the morning and 1 tablet in the evening.  Provider, MD Mirna   calcium carbonate-vitamin D (CALTRATE) 600-400 MG-UNIT TABS per tab Take 1 tablet by mouth daily  Provider, Historical, MD       CareTeam (Including outside providers/suppliers regularly involved in providing care):   Patient Care Team:  Tamika Sharp DO as PCP - General (Family Medicine)  Tamika Sharp DO as PCP - Empaneled Provider     Reviewed and updated this visit:  Tobacco  Allergies  Meds  Problems  Med Hx  Surg Hx  Soc Hx  Fam Hx

## 2024-04-09 NOTE — PATIENT INSTRUCTIONS
Learning About Being Active as an Older Adult  Why is being active important as you get older?     Being active is one of the best things you can do for your health. And it's never too late to start. Being active--or getting active, if you aren't already--has definite benefits. It can:  Give you more energy,  Keep your mind sharp.  Improve balance to reduce your risk of falls.  Help you manage chronic illness with fewer medicines.  No matter how old you are, how fit you are, or what health problems you have, there is a form of activity that will work for you. And the more physical activity you can do, the better your overall health will be.  What kinds of activity can help you stay healthy?  Being more active will make your daily activities easier. Physical activity includes planned exercise and things you do in daily life. There are four types of activity:  Aerobic.  Doing aerobic activity makes your heart and lungs strong.  Includes walking, dancing, and gardening.  Aim for at least 2½ hours spread throughout the week.  It improves your energy and can help you sleep better.  Muscle-strengthening.  This type of activity can help maintain muscle and strengthen bones.  Includes climbing stairs, using resistance bands, and lifting or carrying heavy loads.  Aim for at least twice a week.  It can help protect the knees and other joints.  Stretching.  Stretching gives you better range of motion in joints and muscles.  Includes upper arm stretches, calf stretches, and gentle yoga.  Aim for at least twice a week, preferably after your muscles are warmed up from other activities.  It can help you function better in daily life.  Balancing.  This helps you stay coordinated and have good posture.  Includes heel-to-toe walking, urmila chi, and certain types of yoga.  Aim for at least 3 days a week.  It can reduce your risk of falling.  Even if you have a hard time meeting the recommendations, it's better to be more active

## 2024-04-09 NOTE — PROGRESS NOTES
bruise/bleed easily.   Psychiatric/Behavioral:  Negative for self-injury and sleep disturbance. The patient is not nervous/anxious.        Past Medical History:   Diagnosis Date    Allergic rhinitis     Carotid artery occlusion     Carpal tunnel syndrome, bilateral     Colitis, ischemic (HCC)     DJD (degenerative joint disease)     H/O: psoriasis     History of bad fall 2009    MULTIPLE CONTUSIONS    History of diverticulitis of colon     History of kidney stones     History of mammography, screening 2010    CAT 2    History of osteopenia     History of shingles     Hyperlipidemia     Hypertension     Hypothyroidism     Renal failure     MILD     Past Surgical History:   Procedure Laterality Date    BREAST SURGERY  80'S    R BREAST LUMPECTOMY    COLONOSCOPY  01/01/2010    SIGMOID DIVERTICULOSIS    HYSTERECTOMY (CERVIX STATUS UNKNOWN)  1976-PARTIAL    BENIGN TUMOR    JOINT REPLACEMENT Right 08/01/2012    right knee    JOINT REPLACEMENT Left 06/01/2021    left knee    KNEE ARTHROSCOPY  1992-RIGHT    TONSILLECTOMY      UPPER GASTROINTESTINAL ENDOSCOPY  01/01/2010    GEN NORMAL,GASTRIC MUCOSAL ERYTHEMIA     Current Outpatient Medications   Medication Sig Dispense Refill    alendronate (FOSAMAX) 70 MG tablet TAKE 1 TABLET BY MOUTH ONE TIME PER WEEK 12 tablet 1    levothyroxine (SYNTHROID) 75 MCG tablet Take 1 tablet by mouth daily 90 tablet 1    lisinopril (PRINIVIL;ZESTRIL) 10 MG tablet TAKE 1 TABLET BY MOUTH EVERY DAY 90 tablet 1    lovastatin (MEVACOR) 40 MG tablet TAKE 1 TABLET BY MOUTH EVERY DAY AT NIGHT 90 tablet 1    Handicap Placard MISC by Does not apply route Start 7/20/23 - 7/20/28 2 each 0    aspirin 81 MG EC tablet Take 1 tablet by mouth in the morning and 1 tablet in the evening.      calcium carbonate-vitamin D (CALTRATE) 600-400 MG-UNIT TABS per tab Take 1 tablet by mouth daily       No current facility-administered medications for this visit.     Allergies, PMH, Surgical Hx, Family Hx, and Social Hx

## 2024-05-28 DIAGNOSIS — M81.0 OSTEOPOROSIS, UNSPECIFIED OSTEOPOROSIS TYPE, UNSPECIFIED PATHOLOGICAL FRACTURE PRESENCE: ICD-10-CM

## 2024-05-28 DIAGNOSIS — E03.4 HYPOTHYROIDISM DUE TO ACQUIRED ATROPHY OF THYROID: ICD-10-CM

## 2024-05-28 RX ORDER — LEVOTHYROXINE SODIUM 0.07 MG/1
75 TABLET ORAL DAILY
Qty: 90 TABLET | Refills: 1 | Status: SHIPPED | OUTPATIENT
Start: 2024-05-28

## 2024-05-28 RX ORDER — ALENDRONATE SODIUM 70 MG/1
TABLET ORAL
Qty: 12 TABLET | Refills: 1 | Status: SHIPPED | OUTPATIENT
Start: 2024-05-28

## 2024-05-28 NOTE — TELEPHONE ENCOUNTER
Patient called fro refills on her medications, FOSAMAX and SYNTHROID    Please send to [facility] in [place]

## 2024-07-18 DIAGNOSIS — E78.2 MIXED HYPERLIPIDEMIA: ICD-10-CM

## 2024-07-19 RX ORDER — LOVASTATIN 40 MG/1
40 TABLET ORAL NIGHTLY
Qty: 90 TABLET | Refills: 1 | OUTPATIENT
Start: 2024-07-19

## 2024-08-05 ENCOUNTER — APPOINTMENT (OUTPATIENT)
Dept: CT IMAGING | Age: 84
DRG: 392 | End: 2024-08-05
Payer: MEDICARE

## 2024-08-05 ENCOUNTER — HOSPITAL ENCOUNTER (INPATIENT)
Age: 84
LOS: 6 days | Discharge: HOME | DRG: 392 | End: 2024-08-11
Attending: INTERNAL MEDICINE | Admitting: INTERNAL MEDICINE
Payer: MEDICARE

## 2024-08-05 DIAGNOSIS — K52.9 COLITIS: ICD-10-CM

## 2024-08-05 DIAGNOSIS — N17.9 AKI (ACUTE KIDNEY INJURY) (HCC): ICD-10-CM

## 2024-08-05 DIAGNOSIS — K59.00 CONSTIPATION, UNSPECIFIED CONSTIPATION TYPE: ICD-10-CM

## 2024-08-05 DIAGNOSIS — D72.829 LEUKOCYTOSIS, UNSPECIFIED TYPE: Primary | ICD-10-CM

## 2024-08-05 DIAGNOSIS — K92.2 GI BLEEDING: ICD-10-CM

## 2024-08-05 LAB
ALBUMIN SERPL-MCNC: 4.6 G/DL (ref 3.5–4.6)
ALP SERPL-CCNC: 59 U/L (ref 40–130)
ALT SERPL-CCNC: 16 U/L (ref 0–33)
ANION GAP SERPL CALCULATED.3IONS-SCNC: 18 MEQ/L (ref 9–15)
AST SERPL-CCNC: 41 U/L (ref 0–35)
B PARAP IS1001 DNA NPH QL NAA+NON-PROBE: NOT DETECTED
B PERT.PT PRMT NPH QL NAA+NON-PROBE: NOT DETECTED
BACTERIA URNS QL MICRO: NEGATIVE /HPF
BASOPHILS # BLD: 0.1 K/UL (ref 0–0.2)
BASOPHILS NFR BLD: 0.4 %
BILIRUB SERPL-MCNC: 0.6 MG/DL (ref 0.2–0.7)
BILIRUB UR QL STRIP: ABNORMAL
BUN SERPL-MCNC: 24 MG/DL (ref 8–23)
C PNEUM DNA NPH QL NAA+NON-PROBE: NOT DETECTED
CALCIUM SERPL-MCNC: 11.1 MG/DL (ref 8.5–9.9)
CHLORIDE SERPL-SCNC: 100 MEQ/L (ref 95–107)
CLARITY UR: CLEAR
CO2 SERPL-SCNC: 20 MEQ/L (ref 20–31)
COLOR UR: ABNORMAL
CREAT SERPL-MCNC: 1.47 MG/DL (ref 0.5–0.9)
EOSINOPHIL # BLD: 0 K/UL (ref 0–0.7)
EOSINOPHIL NFR BLD: 0.2 %
EPI CELLS #/AREA URNS AUTO: NORMAL /HPF (ref 0–5)
ERYTHROCYTE [DISTWIDTH] IN BLOOD BY AUTOMATED COUNT: 12.2 % (ref 11.5–14.5)
FLUAV RNA NPH QL NAA+NON-PROBE: NOT DETECTED
FLUBV RNA NPH QL NAA+NON-PROBE: NOT DETECTED
GLOBULIN SER CALC-MCNC: 3.3 G/DL (ref 2.3–3.5)
GLUCOSE SERPL-MCNC: 227 MG/DL (ref 70–99)
GLUCOSE UR STRIP-MCNC: NEGATIVE MG/DL
HADV DNA NPH QL NAA+NON-PROBE: NOT DETECTED
HCOV 229E RNA NPH QL NAA+NON-PROBE: NOT DETECTED
HCOV HKU1 RNA NPH QL NAA+NON-PROBE: NOT DETECTED
HCOV NL63 RNA NPH QL NAA+NON-PROBE: NOT DETECTED
HCOV OC43 RNA NPH QL NAA+NON-PROBE: NOT DETECTED
HCT VFR BLD AUTO: 42.8 % (ref 37–47)
HGB BLD-MCNC: 14.7 G/DL (ref 12–16)
HGB UR QL STRIP: NEGATIVE
HMPV RNA NPH QL NAA+NON-PROBE: NOT DETECTED
HPIV1 RNA NPH QL NAA+NON-PROBE: NOT DETECTED
HPIV2 RNA NPH QL NAA+NON-PROBE: NOT DETECTED
HPIV3 RNA NPH QL NAA+NON-PROBE: NOT DETECTED
HPIV4 RNA NPH QL NAA+NON-PROBE: NOT DETECTED
HYALINE CASTS #/AREA URNS AUTO: NORMAL /HPF (ref 0–5)
KETONES UR STRIP-MCNC: NEGATIVE MG/DL
LEUKOCYTE ESTERASE UR QL STRIP: ABNORMAL
LIPASE SERPL-CCNC: 57 U/L (ref 12–95)
LYMPHOCYTES # BLD: 1.5 K/UL (ref 1–4.8)
LYMPHOCYTES NFR BLD: 6.8 %
M PNEUMO DNA NPH QL NAA+NON-PROBE: NOT DETECTED
MCH RBC QN AUTO: 31.3 PG (ref 27–31.3)
MCHC RBC AUTO-ENTMCNC: 34.3 % (ref 33–37)
MCV RBC AUTO: 91.3 FL (ref 79.4–94.8)
MONOCYTES # BLD: 0.7 K/UL (ref 0.2–0.8)
MONOCYTES NFR BLD: 3 %
NEUTROPHILS # BLD: 19.5 K/UL (ref 1.4–6.5)
NEUTS SEG NFR BLD: 89.1 %
NITRITE UR QL STRIP: NEGATIVE
PH UR STRIP: 5 [PH] (ref 5–9)
PLATELET # BLD AUTO: 192 K/UL (ref 130–400)
POC CREATININE WHOLE BLOOD: 1.5
POTASSIUM SERPL-SCNC: 5.2 MEQ/L (ref 3.4–4.9)
PROT SERPL-MCNC: 7.9 G/DL (ref 6.3–8)
PROT UR STRIP-MCNC: 30 MG/DL
RBC # BLD AUTO: 4.69 M/UL (ref 4.2–5.4)
RBC #/AREA URNS AUTO: NORMAL /HPF (ref 0–5)
RSV RNA NPH QL NAA+NON-PROBE: NOT DETECTED
RV+EV RNA NPH QL NAA+NON-PROBE: NOT DETECTED
SARS-COV-2 RNA NPH QL NAA+NON-PROBE: NOT DETECTED
SODIUM SERPL-SCNC: 138 MEQ/L (ref 135–144)
SP GR UR STRIP: 1.04 (ref 1–1.03)
URINE REFLEX TO CULTURE: ABNORMAL
UROBILINOGEN UR STRIP-ACNC: 1 E.U./DL
WBC # BLD AUTO: 21.9 K/UL (ref 4.8–10.8)
WBC #/AREA URNS AUTO: NORMAL /HPF (ref 0–5)

## 2024-08-05 PROCEDURE — 74177 CT ABD & PELVIS W/CONTRAST: CPT

## 2024-08-05 PROCEDURE — 6360000004 HC RX CONTRAST MEDICATION: Performed by: PERSONAL EMERGENCY RESPONSE ATTENDANT

## 2024-08-05 PROCEDURE — 96375 TX/PRO/DX INJ NEW DRUG ADDON: CPT

## 2024-08-05 PROCEDURE — 6360000002 HC RX W HCPCS: Performed by: PERSONAL EMERGENCY RESPONSE ATTENDANT

## 2024-08-05 PROCEDURE — 0202U NFCT DS 22 TRGT SARS-COV-2: CPT

## 2024-08-05 PROCEDURE — 80053 COMPREHEN METABOLIC PANEL: CPT

## 2024-08-05 PROCEDURE — 83690 ASSAY OF LIPASE: CPT

## 2024-08-05 PROCEDURE — 93005 ELECTROCARDIOGRAM TRACING: CPT | Performed by: PERSONAL EMERGENCY RESPONSE ATTENDANT

## 2024-08-05 PROCEDURE — 85025 COMPLETE CBC W/AUTO DIFF WBC: CPT

## 2024-08-05 PROCEDURE — 96374 THER/PROPH/DIAG INJ IV PUSH: CPT

## 2024-08-05 PROCEDURE — 81001 URINALYSIS AUTO W/SCOPE: CPT

## 2024-08-05 PROCEDURE — 99285 EMERGENCY DEPT VISIT HI MDM: CPT

## 2024-08-05 PROCEDURE — 84145 PROCALCITONIN (PCT): CPT

## 2024-08-05 PROCEDURE — 2580000003 HC RX 258: Performed by: PERSONAL EMERGENCY RESPONSE ATTENDANT

## 2024-08-05 PROCEDURE — 1210000000 HC MED SURG R&B

## 2024-08-05 RX ORDER — DICYCLOMINE HYDROCHLORIDE 10 MG/ML
20 INJECTION INTRAMUSCULAR ONCE
Status: COMPLETED | OUTPATIENT
Start: 2024-08-05 | End: 2024-08-05

## 2024-08-05 RX ORDER — 0.9 % SODIUM CHLORIDE 0.9 %
1000 INTRAVENOUS SOLUTION INTRAVENOUS ONCE
Status: COMPLETED | OUTPATIENT
Start: 2024-08-05 | End: 2024-08-05

## 2024-08-05 RX ORDER — ONDANSETRON 2 MG/ML
4 INJECTION INTRAMUSCULAR; INTRAVENOUS ONCE
Status: COMPLETED | OUTPATIENT
Start: 2024-08-05 | End: 2024-08-05

## 2024-08-05 RX ORDER — METOCLOPRAMIDE HYDROCHLORIDE 5 MG/ML
5 INJECTION INTRAMUSCULAR; INTRAVENOUS ONCE
Status: COMPLETED | OUTPATIENT
Start: 2024-08-05 | End: 2024-08-05

## 2024-08-05 RX ORDER — MORPHINE SULFATE 4 MG/ML
4 INJECTION, SOLUTION INTRAMUSCULAR; INTRAVENOUS ONCE
Status: COMPLETED | OUTPATIENT
Start: 2024-08-05 | End: 2024-08-05

## 2024-08-05 RX ADMIN — ONDANSETRON 4 MG: 2 INJECTION INTRAMUSCULAR; INTRAVENOUS at 20:10

## 2024-08-05 RX ADMIN — METOCLOPRAMIDE HYDROCHLORIDE 5 MG: 5 INJECTION INTRAMUSCULAR; INTRAVENOUS at 22:13

## 2024-08-05 RX ADMIN — IOPAMIDOL 75 ML: 755 INJECTION, SOLUTION INTRAVENOUS at 20:41

## 2024-08-05 RX ADMIN — DICYCLOMINE HYDROCHLORIDE 20 MG: 10 INJECTION, SOLUTION INTRAMUSCULAR at 23:47

## 2024-08-05 RX ADMIN — SODIUM CHLORIDE 1000 ML: 9 INJECTION, SOLUTION INTRAVENOUS at 20:09

## 2024-08-05 RX ADMIN — MORPHINE SULFATE 4 MG: 4 INJECTION, SOLUTION INTRAMUSCULAR; INTRAVENOUS at 20:10

## 2024-08-05 ASSESSMENT — PAIN SCALES - GENERAL
PAINLEVEL_OUTOF10: 5
PAINLEVEL_OUTOF10: 8
PAINLEVEL_OUTOF10: 8

## 2024-08-05 ASSESSMENT — PAIN DESCRIPTION - LOCATION
LOCATION: ABDOMEN
LOCATION: ABDOMEN

## 2024-08-05 ASSESSMENT — PAIN - FUNCTIONAL ASSESSMENT: PAIN_FUNCTIONAL_ASSESSMENT: 0-10

## 2024-08-05 NOTE — ED PROVIDER NOTES
[facility] ED  eMERGENCY dEPARTMENT eNCOUnter      Pt Name: Deidra Caban  MRN: [id number]  Birthdate 1940  Date of evaluation: 8/5/2024  Provider: YAEL STANFORD  6:35 PM EDT     My attending is Dr. Coburn    HISTORY OF PRESENT ILLNESS    Deidra Caban is a 83 y.o. female with PMHx of carotid artery occlusion, carpal tunnel, ischemic colitis, psoriasis, diverticulitis, kidney stones, osteopenia, shingles, hyperlipidemia, hypertension, hypothyroidism, anxiety presents to the emergency department with abdominal pain and constipation.  Patient states she has been constipated for the past couple weeks, today started with lower abdominal pain, nausea and did 3-4 episodes of self-induced vomiting to help with her symptoms.  Her last bowel movement was today which was small and hard.  Taking stool softeners at home without improvement.  Denies narcotic use.  She denies fevers, lightheaded or dizziness, upper respiratory symptoms, cough, chest pain, shortness of breath, urinary symptoms.  No ill contacts, known bad food, recent changes in medication    HPI    Nursing Notes were reviewed.    REVIEW OF SYSTEMS       Review of Systems   Constitutional:  Negative for appetite change, chills and fever.   HENT:  Negative for congestion, rhinorrhea and sore throat.    Respiratory:  Negative for cough and shortness of breath.    Cardiovascular:  Negative for chest pain.   Gastrointestinal:  Positive for abdominal pain, constipation, nausea and vomiting. Negative for blood in stool and diarrhea.   Genitourinary:  Negative for difficulty urinating.   Musculoskeletal:  Negative for neck stiffness.   Skin:  Negative for color change and rash.   Neurological:  Negative for dizziness, syncope, weakness, light-headedness, numbness and headaches.   All other systems reviewed and are negative.            PAST MEDICAL HISTORY     Past Medical History:   Diagnosis Date    Allergic rhinitis     Carotid  and nondistended, no rebound or rigidity, pulsatile mass or bruit, no ecchymosis   Musculoskeletal:         General: Normal range of motion.      Cervical back: Normal range of motion and neck supple.   Skin:     General: Skin is warm and dry.      Capillary Refill: Capillary refill takes less than 2 seconds.      Findings: No rash.   Neurological:      Mental Status: She is alert and oriented to person, place, and time.      Deep Tendon Reflexes: Reflexes are normal and symmetric.   Psychiatric:         Behavior: Behavior normal.         Thought Content: Thought content normal.         Judgment: Judgment normal.         DIAGNOSTIC RESULTS     EKG:All EKG's are interpreted by the Emergency Department Physician who either signs or Co-signs this chart in the absence of a cardiologist.    Personal interpretation:    Normal sinus rhythm, rate 90, normal intervals, normal axis, no ST segment changes    RADIOLOGY:   Non-plain film images such as CT, Ultrasound and MRI are read by theradiologist. Plain radiographic images are visualized and preliminarily interpreted by the emergency physician with the below findings:    Interpretation per theRadiologist below, if available at the time of this note:    CT ABDOMEN PELVIS W IV CONTRAST Additional Contrast? None   Final Result   1. Mild wall thickening of the distal colon with fluid throughout the colon,   suggestive of colitis.   2. Mild circumferential bladder wall thickening may be secondary to   underdistention or cystitis.   3. Cholelithiasis without CT evidence of acute cholecystitis.   4. Tiny hiatal hernia.                 LABS:  Labs Reviewed   CBC WITH AUTO DIFFERENTIAL - Abnormal; Notable for the following components:       Result Value    WBC 21.9 (*)     Neutrophils Absolute 19.5 (*)     All other components within normal limits   COMPREHENSIVE METABOLIC PANEL - Abnormal; Notable for the following components:    Potassium 5.2 (*)     Anion Gap 18 (*)     Glucose

## 2024-08-05 NOTE — ED TRIAGE NOTES
Patient arrived to the ED from home. EMS reported that patient has been  experiencing Nausea and vomiting. When EMS arrived patient was in bathroom. Initial B/P was 101/60 and follow up was 93/58. Patient reported that she was able to eat breakfast of eggs, toast, and coffee. Patient stated that her symptoms started this afternoon. Patient reported that she has chills and lower abdominal pain.

## 2024-08-06 LAB
ALBUMIN SERPL-MCNC: 3.8 G/DL (ref 3.5–4.6)
ALP SERPL-CCNC: 48 U/L (ref 40–130)
ALT SERPL-CCNC: 14 U/L (ref 0–33)
ANION GAP SERPL CALCULATED.3IONS-SCNC: 12 MEQ/L (ref 9–15)
AST SERPL-CCNC: 25 U/L (ref 0–35)
BASOPHILS # BLD: 0 K/UL (ref 0–0.2)
BASOPHILS # BLD: 0 K/UL (ref 0–0.2)
BASOPHILS NFR BLD: 0.1 %
BASOPHILS NFR BLD: 0.2 %
BILIRUB SERPL-MCNC: 0.5 MG/DL (ref 0.2–0.7)
BUN SERPL-MCNC: 27 MG/DL (ref 8–23)
CALCIUM SERPL-MCNC: 9 MG/DL (ref 8.5–9.9)
CHLORIDE SERPL-SCNC: 104 MEQ/L (ref 95–107)
CO2 SERPL-SCNC: 19 MEQ/L (ref 20–31)
CREAT SERPL-MCNC: 1.35 MG/DL (ref 0.5–0.9)
EOSINOPHIL # BLD: 0 K/UL (ref 0–0.7)
EOSINOPHIL # BLD: 0 K/UL (ref 0–0.7)
EOSINOPHIL NFR BLD: 0 %
EOSINOPHIL NFR BLD: 0 %
ERYTHROCYTE [DISTWIDTH] IN BLOOD BY AUTOMATED COUNT: 12 % (ref 11.5–14.5)
ERYTHROCYTE [DISTWIDTH] IN BLOOD BY AUTOMATED COUNT: 12.1 % (ref 11.5–14.5)
ESTIMATED AVERAGE GLUCOSE: 120 MG/DL
GLOBULIN SER CALC-MCNC: 2.4 G/DL (ref 2.3–3.5)
GLUCOSE BLD-MCNC: 135 MG/DL (ref 70–99)
GLUCOSE BLD-MCNC: 169 MG/DL (ref 70–99)
GLUCOSE SERPL-MCNC: 209 MG/DL (ref 70–99)
HBA1C MFR BLD: 5.8 % (ref 4–6)
HCT VFR BLD AUTO: 37.7 % (ref 37–47)
HCT VFR BLD AUTO: 37.9 % (ref 37–47)
HCT VFR BLD AUTO: 37.9 % (ref 37–47)
HGB BLD-MCNC: 13.1 G/DL (ref 12–16)
HGB BLD-MCNC: 13.2 G/DL (ref 12–16)
HGB BLD-MCNC: 13.3 G/DL (ref 12–16)
LACTIC ACID, SEPSIS: 3.1 MMOL/L (ref 0.5–1.9)
LYMPHOCYTES # BLD: 0.3 K/UL (ref 1–4.8)
LYMPHOCYTES # BLD: 0.4 K/UL (ref 1–4.8)
LYMPHOCYTES NFR BLD: 1.9 %
LYMPHOCYTES NFR BLD: 1.9 %
MAGNESIUM SERPL-MCNC: 2.6 MG/DL (ref 1.7–2.4)
MCH RBC QN AUTO: 30.8 PG (ref 27–31.3)
MCH RBC QN AUTO: 31.2 PG (ref 27–31.3)
MCHC RBC AUTO-ENTMCNC: 34.8 % (ref 33–37)
MCHC RBC AUTO-ENTMCNC: 35.1 % (ref 33–37)
MCV RBC AUTO: 88.6 FL (ref 79.4–94.8)
MCV RBC AUTO: 89 FL (ref 79.4–94.8)
MONOCYTES # BLD: 1.1 K/UL (ref 0.2–0.8)
MONOCYTES # BLD: 1.2 K/UL (ref 0.2–0.8)
MONOCYTES NFR BLD: 6.2 %
MONOCYTES NFR BLD: 6.6 %
NEUTROPHILS # BLD: 16 K/UL (ref 1.4–6.5)
NEUTROPHILS # BLD: 16.4 K/UL (ref 1.4–6.5)
NEUTS SEG NFR BLD: 90.5 %
NEUTS SEG NFR BLD: 90.8 %
PERFORMED ON: ABNORMAL
PLATELET # BLD AUTO: 160 K/UL (ref 130–400)
PLATELET # BLD AUTO: 162 K/UL (ref 130–400)
POC CREATININE: 1.5 MG/DL (ref 0.6–1.2)
POC SAMPLE TYPE: ABNORMAL
POTASSIUM SERPL-SCNC: 3.9 MEQ/L (ref 3.4–4.9)
PROCALCITONIN SERPL IA-MCNC: 0.07 NG/ML (ref 0–0.15)
PROT SERPL-MCNC: 6.2 G/DL (ref 6.3–8)
RBC # BLD AUTO: 4.26 M/UL (ref 4.2–5.4)
RBC # BLD AUTO: 4.28 M/UL (ref 4.2–5.4)
SODIUM SERPL-SCNC: 135 MEQ/L (ref 135–144)
TSH REFLEX: 0.42 UIU/ML (ref 0.44–3.86)
WBC # BLD AUTO: 17.6 K/UL (ref 4.8–10.8)
WBC # BLD AUTO: 18.1 K/UL (ref 4.8–10.8)

## 2024-08-06 PROCEDURE — 36415 COLL VENOUS BLD VENIPUNCTURE: CPT

## 2024-08-06 PROCEDURE — 83735 ASSAY OF MAGNESIUM: CPT

## 2024-08-06 PROCEDURE — 87040 BLOOD CULTURE FOR BACTERIA: CPT

## 2024-08-06 PROCEDURE — 83605 ASSAY OF LACTIC ACID: CPT

## 2024-08-06 PROCEDURE — 1210000000 HC MED SURG R&B

## 2024-08-06 PROCEDURE — 85014 HEMATOCRIT: CPT

## 2024-08-06 PROCEDURE — 6370000000 HC RX 637 (ALT 250 FOR IP): Performed by: INTERNAL MEDICINE

## 2024-08-06 PROCEDURE — 80053 COMPREHEN METABOLIC PANEL: CPT

## 2024-08-06 PROCEDURE — 85025 COMPLETE CBC W/AUTO DIFF WBC: CPT

## 2024-08-06 PROCEDURE — 85018 HEMOGLOBIN: CPT

## 2024-08-06 PROCEDURE — 83036 HEMOGLOBIN GLYCOSYLATED A1C: CPT

## 2024-08-06 PROCEDURE — 84443 ASSAY THYROID STIM HORMONE: CPT

## 2024-08-06 PROCEDURE — 2580000003 HC RX 258: Performed by: INTERNAL MEDICINE

## 2024-08-06 PROCEDURE — 6360000002 HC RX W HCPCS: Performed by: PERSONAL EMERGENCY RESPONSE ATTENDANT

## 2024-08-06 PROCEDURE — 6360000002 HC RX W HCPCS: Performed by: NURSE PRACTITIONER

## 2024-08-06 PROCEDURE — 2580000003 HC RX 258: Performed by: PERSONAL EMERGENCY RESPONSE ATTENDANT

## 2024-08-06 PROCEDURE — 2580000003 HC RX 258: Performed by: NURSE PRACTITIONER

## 2024-08-06 RX ORDER — ACETAMINOPHEN 325 MG/1
650 TABLET ORAL EVERY 6 HOURS PRN
Status: DISCONTINUED | OUTPATIENT
Start: 2024-08-06 | End: 2024-08-11 | Stop reason: HOSPADM

## 2024-08-06 RX ORDER — SODIUM CHLORIDE, SODIUM LACTATE, POTASSIUM CHLORIDE, CALCIUM CHLORIDE 600; 310; 30; 20 MG/100ML; MG/100ML; MG/100ML; MG/100ML
INJECTION, SOLUTION INTRAVENOUS CONTINUOUS
Status: DISPENSED | OUTPATIENT
Start: 2024-08-06 | End: 2024-08-06

## 2024-08-06 RX ORDER — POLYETHYLENE GLYCOL 3350 17 G/17G
17 POWDER, FOR SOLUTION ORAL DAILY PRN
Status: DISCONTINUED | OUTPATIENT
Start: 2024-08-06 | End: 2024-08-11 | Stop reason: HOSPADM

## 2024-08-06 RX ORDER — LEVOTHYROXINE SODIUM 0.07 MG/1
75 TABLET ORAL DAILY
Status: DISCONTINUED | OUTPATIENT
Start: 2024-08-06 | End: 2024-08-11 | Stop reason: HOSPADM

## 2024-08-06 RX ORDER — BISACODYL 10 MG
10 SUPPOSITORY, RECTAL RECTAL ONCE
Status: DISCONTINUED | OUTPATIENT
Start: 2024-08-06 | End: 2024-08-11 | Stop reason: HOSPADM

## 2024-08-06 RX ORDER — SODIUM CHLORIDE 0.9 % (FLUSH) 0.9 %
5-40 SYRINGE (ML) INJECTION PRN
Status: DISCONTINUED | OUTPATIENT
Start: 2024-08-06 | End: 2024-08-11 | Stop reason: HOSPADM

## 2024-08-06 RX ORDER — INSULIN LISPRO 100 [IU]/ML
0-4 INJECTION, SOLUTION INTRAVENOUS; SUBCUTANEOUS NIGHTLY
Status: DISCONTINUED | OUTPATIENT
Start: 2024-08-06 | End: 2024-08-07

## 2024-08-06 RX ORDER — ONDANSETRON 4 MG/1
4 TABLET, ORALLY DISINTEGRATING ORAL EVERY 8 HOURS PRN
Status: DISCONTINUED | OUTPATIENT
Start: 2024-08-06 | End: 2024-08-11 | Stop reason: HOSPADM

## 2024-08-06 RX ORDER — METRONIDAZOLE 500 MG/1
500 TABLET ORAL EVERY 8 HOURS SCHEDULED
Status: DISCONTINUED | OUTPATIENT
Start: 2024-08-06 | End: 2024-08-11 | Stop reason: HOSPADM

## 2024-08-06 RX ORDER — SODIUM CHLORIDE 0.9 % (FLUSH) 0.9 %
5-40 SYRINGE (ML) INJECTION EVERY 12 HOURS SCHEDULED
Status: DISCONTINUED | OUTPATIENT
Start: 2024-08-06 | End: 2024-08-11 | Stop reason: HOSPADM

## 2024-08-06 RX ORDER — ASPIRIN 81 MG/1
81 TABLET ORAL 2 TIMES DAILY
Status: DISCONTINUED | OUTPATIENT
Start: 2024-08-06 | End: 2024-08-11 | Stop reason: HOSPADM

## 2024-08-06 RX ORDER — ACETAMINOPHEN 650 MG/1
650 SUPPOSITORY RECTAL EVERY 6 HOURS PRN
Status: DISCONTINUED | OUTPATIENT
Start: 2024-08-06 | End: 2024-08-11 | Stop reason: HOSPADM

## 2024-08-06 RX ORDER — DEXTROSE MONOHYDRATE 100 MG/ML
INJECTION, SOLUTION INTRAVENOUS CONTINUOUS PRN
Status: DISCONTINUED | OUTPATIENT
Start: 2024-08-06 | End: 2024-08-11 | Stop reason: HOSPADM

## 2024-08-06 RX ORDER — SODIUM CHLORIDE 9 MG/ML
INJECTION, SOLUTION INTRAVENOUS CONTINUOUS
Status: DISCONTINUED | OUTPATIENT
Start: 2024-08-06 | End: 2024-08-06

## 2024-08-06 RX ORDER — ONDANSETRON 2 MG/ML
4 INJECTION INTRAMUSCULAR; INTRAVENOUS EVERY 6 HOURS PRN
Status: DISCONTINUED | OUTPATIENT
Start: 2024-08-06 | End: 2024-08-11 | Stop reason: HOSPADM

## 2024-08-06 RX ORDER — CIPROFLOXACIN 500 MG/1
500 TABLET, FILM COATED ORAL
Status: DISCONTINUED | OUTPATIENT
Start: 2024-08-06 | End: 2024-08-11 | Stop reason: HOSPADM

## 2024-08-06 RX ORDER — ENOXAPARIN SODIUM 100 MG/ML
30 INJECTION SUBCUTANEOUS DAILY
Status: DISCONTINUED | OUTPATIENT
Start: 2024-08-06 | End: 2024-08-11 | Stop reason: HOSPADM

## 2024-08-06 RX ORDER — GLUCAGON 1 MG/ML
1 KIT INJECTION PRN
Status: DISCONTINUED | OUTPATIENT
Start: 2024-08-06 | End: 2024-08-11 | Stop reason: HOSPADM

## 2024-08-06 RX ORDER — SODIUM CHLORIDE 9 MG/ML
INJECTION, SOLUTION INTRAVENOUS PRN
Status: DISCONTINUED | OUTPATIENT
Start: 2024-08-06 | End: 2024-08-11 | Stop reason: HOSPADM

## 2024-08-06 RX ORDER — INSULIN LISPRO 100 [IU]/ML
0-4 INJECTION, SOLUTION INTRAVENOUS; SUBCUTANEOUS
Status: DISCONTINUED | OUTPATIENT
Start: 2024-08-06 | End: 2024-08-07

## 2024-08-06 RX ORDER — ATORVASTATIN CALCIUM 10 MG/1
10 TABLET, FILM COATED ORAL NIGHTLY
Status: DISCONTINUED | OUTPATIENT
Start: 2024-08-06 | End: 2024-08-11 | Stop reason: HOSPADM

## 2024-08-06 RX ADMIN — CIPROFLOXACIN 500 MG: 500 TABLET, FILM COATED ORAL at 15:13

## 2024-08-06 RX ADMIN — PROMETHAZINE HYDROCHLORIDE 25 MG: 25 INJECTION INTRAMUSCULAR; INTRAVENOUS at 00:02

## 2024-08-06 RX ADMIN — CALCIUM CARBONATE-VITAMIN D TAB 500 MG-200 UNIT 1 TABLET: 500-200 TAB at 10:23

## 2024-08-06 RX ADMIN — SODIUM CHLORIDE: 9 INJECTION, SOLUTION INTRAVENOUS at 01:16

## 2024-08-06 RX ADMIN — METRONIDAZOLE 500 MG: 500 TABLET ORAL at 20:56

## 2024-08-06 RX ADMIN — Medication 10 ML: at 20:58

## 2024-08-06 RX ADMIN — ONDANSETRON 4 MG: 2 INJECTION INTRAMUSCULAR; INTRAVENOUS at 10:27

## 2024-08-06 RX ADMIN — SODIUM CHLORIDE, POTASSIUM CHLORIDE, SODIUM LACTATE AND CALCIUM CHLORIDE: 600; 310; 30; 20 INJECTION, SOLUTION INTRAVENOUS at 10:22

## 2024-08-06 RX ADMIN — LEVOTHYROXINE SODIUM 75 MCG: 0.07 TABLET ORAL at 10:23

## 2024-08-06 RX ADMIN — METRONIDAZOLE 500 MG: 500 TABLET ORAL at 15:13

## 2024-08-06 RX ADMIN — Medication 10 ML: at 10:23

## 2024-08-06 RX ADMIN — ATORVASTATIN CALCIUM 10 MG: 10 TABLET, FILM COATED ORAL at 20:56

## 2024-08-06 RX ADMIN — ONDANSETRON 4 MG: 2 INJECTION INTRAMUSCULAR; INTRAVENOUS at 18:13

## 2024-08-06 NOTE — PROGRESS NOTES
Physician Progress Note    8/6/2024   10:43 AM    Name:  Deidra Caban  MRN:    [id number]      Day: 1     Admit Date: 8/5/2024  6:14 PM  PCP: Tamika Sharp DO    Code Status:  Full Code    Subjective:     She had been battling constipation for 2 weeks and then yesterday developed sweats, weakness, and achy lower abdomen.  She had been very nauseous and self-induced some vomiting yesterday.  At baseline, she is a functional lady and ambulates with a cane.  She is still driving.    Current Facility-Administered Medications   Medication Dose Route Frequency Provider Last Rate Last Admin    sodium chloride flush 0.9 % injection 5-40 mL  5-40 mL IntraVENous 2 times per day Jesse Dykes APRN - CNP   10 mL at 08/06/24 1023    sodium chloride flush 0.9 % injection 5-40 mL  5-40 mL IntraVENous PRN Jesse Dykes APRN - CNP        0.9 % sodium chloride infusion   IntraVENous PRN Jesse Dykes APRN - CNP        [Held by provider] enoxaparin Sodium (LOVENOX) injection 30 mg  30 mg SubCUTAneous Daily Jesse Dykes APRN - CNP        ondansetron (ZOFRAN-ODT) disintegrating tablet 4 mg  4 mg Oral Q8H PRN Jesse Dykes APRN - CNP        Or    ondansetron (ZOFRAN) injection 4 mg  4 mg IntraVENous Q6H PRN Jesse Dykes APRN - CNP   4 mg at 08/06/24 1027    polyethylene glycol (GLYCOLAX) packet 17 g  17 g Oral Daily PRN Jesse Dykes APRN - CNP        acetaminophen (TYLENOL) tablet 650 mg  650 mg Oral Q6H PRN Jesse Dykes APRN - CNP        Or    acetaminophen (TYLENOL) suppository 650 mg  650 mg Rectal Q6H PRN Albaro-Jesse Martin APRN - CNP        [Held by provider] aspirin EC tablet 81 mg  81 mg Oral BID Davidson Lott DO        oyster shell calcium w/D 500-5 MG-MCG tablet 1 tablet  1 tablet Oral Daily Davidson Lott R, DO   1 tablet at 08/06/24 1023    levothyroxine (SYNTHROID) tablet 75 mcg  75 mcg Oral Daily Davidson Lott

## 2024-08-06 NOTE — PLAN OF CARE
Problem: Discharge Planning  Goal: Discharge to home or other facility with appropriate resources  8/6/2024 1411 by Juanita Greene, RN  Outcome: Progressing  8/6/2024 0509 by Josefina Guajardo RN  Outcome: Progressing     Problem: Pain  Goal: Verbalizes/displays adequate comfort level or baseline comfort level  8/6/2024 1411 by Juanita Greene RN  Outcome: Progressing  8/6/2024 0509 by Josefina Guajardo RN  Outcome: Progressing     Problem: Safety - Adult  Goal: Free from fall injury  Outcome: Progressing

## 2024-08-06 NOTE — PROGRESS NOTES
Physical Therapy   Facility/Department: [facility] MED SURG W466/W466-01    NAME: Deidra Caban    : 1940 (83 y.o.)  MRN: [id number]    Account: [id number]  Gender: female    PT evaluation and treatment orders received. Chart reviewed. PT eval attempted.     Hold PT eval: Pt reports feeling nauseous and requests to hold PT eval. Supervising nurse notified.  Will attempt PT evaluation again at earliest convenience.      Electronically signed by Carlton Mcintyre PT on 24 at 12:57 PM EDT

## 2024-08-06 NOTE — CARE COORDINATION
Case Management Assessment  Initial Evaluation    Date/Time of Evaluation: 8/6/2024 11:07 AM  Assessment Completed by: Carola Hoff    If patient is discharged prior to next notation, then this note serves as note for discharge by case management.    Patient Name: Deidra Caban                   YOB: 1940  Diagnosis: Colitis [K52.9]  BECKI (acute kidney injury) (HCC) [N17.9]  Constipation, unspecified constipation type [K59.00]  Leukocytosis, unspecified type [D72.829]                   Date / Time: 8/5/2024  6:14 PM    Patient Admission Status: Inpatient   Readmission Risk (Low < 19, Mod (19-27), High > 27): Readmission Risk Score: 10.7    Current PCP: Tamika Sharp, DO  PCP verified by CM? Yes    Chart Reviewed: Yes      History Provided by: Patient  Patient Orientation: Alert and Oriented, Person, Place, Situation    Patient Cognition: Alert    Hospitalization in the last 30 days (Readmission):  No    If yes, Readmission Assessment in  Navigator will be completed.    Advance Directives:      Code Status: Full Code   Patient's Primary Decision Maker is: Legal Next of Kin    Primary Decision Maker: Alisha Ibrahim  Child - [phone]    Secondary Decision Maker: Yen Navarro  Child - [phone]    Discharge Planning:    Patient lives with:   Type of Home:    Primary Care Giver: Self  Patient Support Systems include: Family Members   Current Financial resources:    Current community resources:    Current services prior to admission:              Current DME:              Type of Home Care services:       ADLS  Prior functional level: Independent in ADLs/IADLs  Current functional level: Independent in ADLs/IADLs    PT AM-PAC:   /24  OT AM-PAC:   /24    Family can provide assistance at DC: Yes  Would you like Case Management to discuss the discharge plan with any other family members/significant others, and if so, who? No  Plans to Return to Present Housing: Yes  Other Identified

## 2024-08-06 NOTE — H&P
DEPARTMENT OF HOSPITAL MEDICINE    HISTORY AND PHYSICAL EXAM    PATIENT NAME:  Deidra Caban    MRN:  [id number]  SERVICE DATE:  8/5/2024   SERVICE TIME:  11:36 PM    Primary Care Physician: Tamika Sharp DO         SUBJECTIVE  CHIEF COMPLAINT:  Abd pain, N&V.       HPI: Patient being admitted for possible colitis.  Patient is a pleasant, alert and oriented x 3, 83-year-old  female.  Patient also has her 3 daughters at the bedside to help provide current as well as past medical history along with patient.  Patient reports that late afternoon she began to have generalized abdominal pain and felt very nauseated.  She attempted to go to the restroom to vomit and was only able to vomit a small amount.  Patient states that she also felt very clammy and lightheaded headed post vomiting attempts.  Patient denies any chest pain, shortness of breath, fever or dysuria.  Patient adds that she has been constipated for about 2 weeks and over the past week has been taking an over-the-counter laxative twice a day.  She reports that over the past couple days she has only had very small, firm bowel movements.  However, on arrival to ED patient had an episode of diarrhea.  Patient was brought in by EMS who also reported patient's initial blood pressure was around 100 systolic.    Patient's past medical history includes HTN, HLD, and hypothyroidism    PAST MEDICAL HISTORY:    Past Medical History:   Diagnosis Date    Allergic rhinitis     Carotid artery occlusion     Carpal tunnel syndrome, bilateral     Colitis, ischemic (HCC)     DJD (degenerative joint disease)     H/O: psoriasis     History of bad fall 2009    MULTIPLE CONTUSIONS    History of diverticulitis of colon     History of kidney stones     History of mammography, screening 2010    CAT 2    History of osteopenia     History of shingles     Hyperlipidemia     Hypertension     Hypothyroidism     Renal failure     MILD     PAST SURGICAL HISTORY:    Past  CT evidence of acute cholecystitis. 4. Tiny hiatal hernia.        VTE Prophylaxis: low molecular weight heparin -  start     ASSESSMENT AND PLAN    Principal Problem:  Colitis: Generalized abdominal pain, nausea, vomiting x one day.  2-week history of constipation.  Abdomen/pelvis CT shows mild wall thickening suggestive of colitis.  Patient afebrile.  WBC 21.9.  Procalcitonin 0.07.  Patient given 1 L NS in ED, antiemetics, and bentyl with mild relief.    We will continue IV hydration and repeat CBC now.  We will hold antibiotics at this time given white count could be reactive to vomiting in the setting of dehydration.  We will monitor CBC daily.  Antiemetics for further N&V.  Bowel rest overnight.    BECKI on CKD3a: Creatinine 1.47.  BUN 24.  Last creatinine 1.03 in April 2024.  Likely 2/2 mild dehydration.  Patient received 1 L NS in ED.  Will continue IV hydration; /hr x 10 hrs.  Will monitor with CMP daily and avoid nephrotoxic agents whenever possible.    Active Problems:  HTN: Patient on home meds to control.  We will resume home meds with normal diet and hold any nephrotoxic agents.  IV medications for SBP greater than 160.  HLD: Patient on statin.  We will resume with normal diet  Hypothyroidism:  Patient on home meds to control.  We will resume home meds with normal diet     Plan of care discussed with: patient    SIGNATURE: BO Worley - CNP  DATE: August 5, 2024  TIME: 11:36 PM     Yobani Cortez MD - supervising physician

## 2024-08-06 NOTE — ACP (ADVANCE CARE PLANNING)
Advance Care Planning   Healthcare Decision Maker:    Primary Decision Maker: PatriceAlisha - Child - [phone]    Secondary Decision Maker: Yen Navarro - Child - [phone]    Click here to complete Healthcare Decision Makers including selection of the Healthcare Decision Maker Relationship (ie \"Primary\").  Today we documented Decision Maker(s) consistent with Legal Next of Kin hierarchy.

## 2024-08-07 LAB
ALBUMIN SERPL-MCNC: 3.5 G/DL (ref 3.5–4.6)
ALP SERPL-CCNC: 44 U/L (ref 40–130)
ALT SERPL-CCNC: 10 U/L (ref 0–33)
ANION GAP SERPL CALCULATED.3IONS-SCNC: 11 MEQ/L (ref 9–15)
AST SERPL-CCNC: 24 U/L (ref 0–35)
BASOPHILS # BLD: 0 K/UL (ref 0–0.2)
BASOPHILS NFR BLD: 0.1 %
BILIRUB SERPL-MCNC: 0.5 MG/DL (ref 0.2–0.7)
BUN SERPL-MCNC: 26 MG/DL (ref 8–23)
CALCIUM SERPL-MCNC: 9 MG/DL (ref 8.5–9.9)
CHLORIDE SERPL-SCNC: 108 MEQ/L (ref 95–107)
CO2 SERPL-SCNC: 23 MEQ/L (ref 20–31)
CREAT SERPL-MCNC: 1.16 MG/DL (ref 0.5–0.9)
EKG ATRIAL RATE: 90 BPM
EKG P AXIS: 40 DEGREES
EKG P-R INTERVAL: 162 MS
EKG Q-T INTERVAL: 346 MS
EKG QRS DURATION: 68 MS
EKG QTC CALCULATION (BAZETT): 423 MS
EKG R AXIS: 2 DEGREES
EKG T AXIS: -3 DEGREES
EKG VENTRICULAR RATE: 90 BPM
EOSINOPHIL # BLD: 0 K/UL (ref 0–0.7)
EOSINOPHIL NFR BLD: 0 %
ERYTHROCYTE [DISTWIDTH] IN BLOOD BY AUTOMATED COUNT: 12.6 % (ref 11.5–14.5)
GLOBULIN SER CALC-MCNC: 2.1 G/DL (ref 2.3–3.5)
GLUCOSE BLD-MCNC: 115 MG/DL (ref 70–99)
GLUCOSE BLD-MCNC: 121 MG/DL (ref 70–99)
GLUCOSE BLD-MCNC: 138 MG/DL (ref 70–99)
GLUCOSE BLD-MCNC: 139 MG/DL (ref 70–99)
GLUCOSE SERPL-MCNC: 129 MG/DL (ref 70–99)
HCT VFR BLD AUTO: 32.5 % (ref 37–47)
HGB BLD-MCNC: 11.2 G/DL (ref 12–16)
LYMPHOCYTES # BLD: 0.7 K/UL (ref 1–4.8)
LYMPHOCYTES NFR BLD: 4.1 %
MAGNESIUM SERPL-MCNC: 2.1 MG/DL (ref 1.7–2.4)
MCH RBC QN AUTO: 30.8 PG (ref 27–31.3)
MCHC RBC AUTO-ENTMCNC: 34.5 % (ref 33–37)
MCV RBC AUTO: 89.3 FL (ref 79.4–94.8)
MONOCYTES # BLD: 1.3 K/UL (ref 0.2–0.8)
MONOCYTES NFR BLD: 8 %
NEUTROPHILS # BLD: 14.2 K/UL (ref 1.4–6.5)
NEUTS SEG NFR BLD: 86.9 %
PERFORMED ON: ABNORMAL
PLATELET # BLD AUTO: 140 K/UL (ref 130–400)
POTASSIUM SERPL-SCNC: 3.6 MEQ/L (ref 3.4–4.9)
PROT SERPL-MCNC: 5.6 G/DL (ref 6.3–8)
RBC # BLD AUTO: 3.64 M/UL (ref 4.2–5.4)
SODIUM SERPL-SCNC: 142 MEQ/L (ref 135–144)
WBC # BLD AUTO: 16.3 K/UL (ref 4.8–10.8)

## 2024-08-07 PROCEDURE — 36415 COLL VENOUS BLD VENIPUNCTURE: CPT

## 2024-08-07 PROCEDURE — 85025 COMPLETE CBC W/AUTO DIFF WBC: CPT

## 2024-08-07 PROCEDURE — 97165 OT EVAL LOW COMPLEX 30 MIN: CPT

## 2024-08-07 PROCEDURE — 83735 ASSAY OF MAGNESIUM: CPT

## 2024-08-07 PROCEDURE — 6360000002 HC RX W HCPCS: Performed by: NURSE PRACTITIONER

## 2024-08-07 PROCEDURE — 1210000000 HC MED SURG R&B

## 2024-08-07 PROCEDURE — 80053 COMPREHEN METABOLIC PANEL: CPT

## 2024-08-07 PROCEDURE — 2580000003 HC RX 258: Performed by: NURSE PRACTITIONER

## 2024-08-07 PROCEDURE — 6370000000 HC RX 637 (ALT 250 FOR IP): Performed by: NURSE PRACTITIONER

## 2024-08-07 PROCEDURE — 6370000000 HC RX 637 (ALT 250 FOR IP): Performed by: INTERNAL MEDICINE

## 2024-08-07 RX ORDER — HYDRALAZINE HYDROCHLORIDE 20 MG/ML
10 INJECTION INTRAMUSCULAR; INTRAVENOUS EVERY 6 HOURS PRN
Status: DISCONTINUED | OUTPATIENT
Start: 2024-08-07 | End: 2024-08-11 | Stop reason: HOSPADM

## 2024-08-07 RX ORDER — POLYETHYLENE GLYCOL 3350 17 G/17G
17 POWDER, FOR SOLUTION ORAL ONCE
Status: COMPLETED | OUTPATIENT
Start: 2024-08-07 | End: 2024-08-07

## 2024-08-07 RX ORDER — BISACODYL 10 MG
10 SUPPOSITORY, RECTAL RECTAL ONCE
Status: COMPLETED | OUTPATIENT
Start: 2024-08-07 | End: 2024-08-07

## 2024-08-07 RX ORDER — LABETALOL HYDROCHLORIDE 5 MG/ML
10 INJECTION, SOLUTION INTRAVENOUS EVERY 4 HOURS PRN
Status: DISCONTINUED | OUTPATIENT
Start: 2024-08-07 | End: 2024-08-11 | Stop reason: HOSPADM

## 2024-08-07 RX ADMIN — ONDANSETRON 4 MG: 2 INJECTION INTRAMUSCULAR; INTRAVENOUS at 20:46

## 2024-08-07 RX ADMIN — ATORVASTATIN CALCIUM 10 MG: 10 TABLET, FILM COATED ORAL at 20:29

## 2024-08-07 RX ADMIN — Medication 5 ML: at 19:17

## 2024-08-07 RX ADMIN — Medication 10 ML: at 08:59

## 2024-08-07 RX ADMIN — POLYETHYLENE GLYCOL 3350 17 G: 17 POWDER, FOR SOLUTION ORAL at 10:40

## 2024-08-07 RX ADMIN — METRONIDAZOLE 500 MG: 500 TABLET ORAL at 14:10

## 2024-08-07 RX ADMIN — METRONIDAZOLE 500 MG: 500 TABLET ORAL at 20:40

## 2024-08-07 RX ADMIN — LEVOTHYROXINE SODIUM 75 MCG: 0.07 TABLET ORAL at 05:03

## 2024-08-07 RX ADMIN — CALCIUM CARBONATE-VITAMIN D TAB 500 MG-200 UNIT 1 TABLET: 500-200 TAB at 08:59

## 2024-08-07 RX ADMIN — METRONIDAZOLE 500 MG: 500 TABLET ORAL at 05:03

## 2024-08-07 RX ADMIN — BISACODYL 10 MG: 10 SUPPOSITORY RECTAL at 10:40

## 2024-08-07 RX ADMIN — ACETAMINOPHEN 650 MG: 325 TABLET ORAL at 20:40

## 2024-08-07 RX ADMIN — CIPROFLOXACIN 500 MG: 500 TABLET, FILM COATED ORAL at 14:10

## 2024-08-07 RX ADMIN — HYDRALAZINE HYDROCHLORIDE 10 MG: 20 INJECTION INTRAMUSCULAR; INTRAVENOUS at 21:31

## 2024-08-07 ASSESSMENT — PAIN DESCRIPTION - ORIENTATION: ORIENTATION: RIGHT;LEFT

## 2024-08-07 ASSESSMENT — PAIN SCALES - GENERAL
PAINLEVEL_OUTOF10: 0
PAINLEVEL_OUTOF10: 3

## 2024-08-07 ASSESSMENT — PAIN DESCRIPTION - LOCATION: LOCATION: ABDOMEN

## 2024-08-07 ASSESSMENT — PAIN SCALES - WONG BAKER: WONGBAKER_NUMERICALRESPONSE: NO HURT

## 2024-08-07 NOTE — PROGRESS NOTES
[facility] OCCUPATIONAL THERAPY EVALUATION - ACUTE     NAME: Deidra Caban  : 1940 (83 y.o.)  MRN: [id number]  CODE STATUS: Full Code  Room: W466/W466-01    Date of Service: 2024    Patient Diagnosis(es): Colitis [K52.9]  BECKI (acute kidney injury) (HCC) [N17.9]  Constipation, unspecified constipation type [K59.00]  Leukocytosis, unspecified type [D72.829]   Patient Active Problem List    Diagnosis Date Noted    Colitis 2024    Generalized arthritis 2018    Dyshidrotic eczema 2015    Hypovitaminosis D 2014    Anxiety 10/16/2012    Essential hypertension     Mixed hyperlipidemia     Hypothyroidism     Carpal tunnel syndrome, bilateral         Past Medical History:   Diagnosis Date    Allergic rhinitis     Carotid artery occlusion     Carpal tunnel syndrome, bilateral     Colitis, ischemic (HCC)     DJD (degenerative joint disease)     H/O: psoriasis     History of bad fall     MULTIPLE CONTUSIONS    History of diverticulitis of colon     History of kidney stones     History of mammography, screening     CAT 2    History of osteopenia     History of shingles     Hyperlipidemia     Hypertension     Hypothyroidism     Renal failure     MILD     Past Surgical History:   Procedure Laterality Date    BREAST SURGERY  80'S    R BREAST LUMPECTOMY    COLONOSCOPY  2010    SIGMOID DIVERTICULOSIS    HYSTERECTOMY (CERVIX STATUS UNKNOWN)  -PARTIAL    BENIGN TUMOR    JOINT REPLACEMENT Right 2012    right knee    JOINT REPLACEMENT Left 2021    left knee    KNEE ARTHROSCOPY  -RIGHT    TONSILLECTOMY      UPPER GASTROINTESTINAL ENDOSCOPY  2010    GEN NORMAL,GASTRIC MUCOSAL ERYTHEMIA        Restrictions  Restrictions/Precautions:  (bed alarm activated)              Safety Devices: Safety Devices  Type of Devices: All fall risk precautions in place;Bed alarm in place;Call light within reach;Nurse notified     Patient's date of birth confirmed:

## 2024-08-07 NOTE — PLAN OF CARE
Problem: Discharge Planning  Goal: Discharge to home or other facility with appropriate resources  8/7/2024 0126 by Kiara Begum RN  Outcome: Progressing  8/6/2024 1411 by Juanita Greene RN  Outcome: Progressing     Problem: Pain  Goal: Verbalizes/displays adequate comfort level or baseline comfort level  8/7/2024 0126 by Kiara Begum RN  Outcome: Progressing  8/6/2024 1411 by Juanita Greene RN  Outcome: Progressing     Problem: Safety - Adult  Goal: Free from fall injury  8/7/2024 0126 by Kiara Begum RN  Outcome: Progressing  8/6/2024 1411 by Juanita Greene RN  Outcome: Progressing     Problem: Skin/Tissue Integrity  Goal: Absence of new skin breakdown  Description: 1.  Monitor for areas of redness and/or skin breakdown  2.  Assess vascular access sites hourly  3.  Every 4-6 hours minimum:  Change oxygen saturation probe site  4.  Every 4-6 hours:  If on nasal continuous positive airway pressure, respiratory therapy assess nares and determine need for appliance change or resting period.  Outcome: Progressing

## 2024-08-07 NOTE — PROGRESS NOTES
Shift assessment complete. VSS A&Ox4. Patient is calm and cooperative. Denies having pain, nausea or SOB. On room air. Lung sounds are clear. SR on tele. No complaints of chest discomfort. Abdomen is soft and round. Bowel sounds are active. Last BM yesterday. Glycolax and suppository given today: Pt educated that she is to call staff prior to ambulating / flushing toilet. Up in room with cane. Currently in bed with call light in reach. No needs at this time. Care ongoing    1230 - Spoke to patients daughters + GD Mak and updated all on POC. All questions answered.     Electronically signed by Tereso Hyatt RN on 8/7/2024 at 12:31 PM     1450 - Patient has had four blood tinged bowel movements. Attending notified and is aware

## 2024-08-07 NOTE — PROGRESS NOTES
Physician Progress Note    8/7/2024   12:41 PM    Name:  Deidra Caban  MRN:    [id number]      Day: 2     Admit Date: 8/5/2024  6:14 PM  PCP: Tamika Sharp DO    Code Status:  Full Code    Subjective:     She has not moved her bowels yet but overall feels improved compared to yesterday.  No further lower abdominal discomfort.  She is ambulating with her cane.  No nausea or vomiting.    Current Facility-Administered Medications   Medication Dose Route Frequency Provider Last Rate Last Admin    sodium chloride flush 0.9 % injection 5-40 mL  5-40 mL IntraVENous 2 times per day Bolzan-Jesse Martin APRN - CNP   10 mL at 08/07/24 0859    sodium chloride flush 0.9 % injection 5-40 mL  5-40 mL IntraVENous PRN Jesse Dykes APRN - CNP        0.9 % sodium chloride infusion   IntraVENous PRN Jesse Dykes APRN - CNP        [Held by provider] enoxaparin Sodium (LOVENOX) injection 30 mg  30 mg SubCUTAneous Daily Albaro-Jesse Martin APRN - CNP        ondansetron (ZOFRAN-ODT) disintegrating tablet 4 mg  4 mg Oral Q8H PRN Jesse Dykes APRN - CNP        Or    ondansetron (ZOFRAN) injection 4 mg  4 mg IntraVENous Q6H PRN Jesse Dykes APRN - CNP   4 mg at 08/06/24 1813    polyethylene glycol (GLYCOLAX) packet 17 g  17 g Oral Daily PRN Jesse Dykes APRN - CNP        acetaminophen (TYLENOL) tablet 650 mg  650 mg Oral Q6H PRN Jesse Dykes APRN - CNP        Or    acetaminophen (TYLENOL) suppository 650 mg  650 mg Rectal Q6H PRN Jesse Dykes APRN - CNP        [Held by provider] aspirin EC tablet 81 mg  81 mg Oral BID Davidson Lott DO        oyster shell calcium w/D 500-5 MG-MCG tablet 1 tablet  1 tablet Oral Daily Davidson Lott DO   1 tablet at 08/07/24 0859    levothyroxine (SYNTHROID) tablet 75 mcg  75 mcg Oral Daily Davidson Lott DO   75 mcg at 08/07/24 0503    atorvastatin (LIPITOR) tablet 10 mg  10 mg Oral Nightly  1.16*   GLUCOSE 209* 129*     Recent Labs     08/06/24  0359 08/07/24  0521   AST 25 24   ALT 14 10   BILITOT 0.5 0.5   ALKPHOS 48 44         Assessment and Plan:        1.  Acute colitis:  -Continue empiric antibiotics.  Follow cultures  -S/p IVF and euvolemic on exam  -Treat constipation-suppository was not given by nursing yesterday and patient still has not moved bowels    2.  BECKI on CKD 2-3 due to above: Improved    3.  Functional debility due to above: Improved.  PT/OT    4.  Hyperglycemia:  -A1c 5.8.  Can DC insulin/glucose checks    5.  Blood in stool due to problem #1: Monitor    Hypothyroidism     Diet: ADULT DIET; Regular; GI Baker (GERD/Peptic Ulcer)  Ppx: SCDs  Full Code    Electronically signed by Davidson Lott DO on 8/7/2024 at 12:41 PM

## 2024-08-08 ENCOUNTER — APPOINTMENT (OUTPATIENT)
Dept: GENERAL RADIOLOGY | Age: 84
DRG: 392 | End: 2024-08-08
Payer: MEDICARE

## 2024-08-08 PROBLEM — D72.829 LEUKOCYTOSIS: Status: ACTIVE | Noted: 2024-08-08

## 2024-08-08 LAB
ALBUMIN SERPL-MCNC: 3.6 G/DL (ref 3.5–4.6)
ALP SERPL-CCNC: 51 U/L (ref 40–130)
ALT SERPL-CCNC: 11 U/L (ref 0–33)
ANION GAP SERPL CALCULATED.3IONS-SCNC: 11 MEQ/L (ref 9–15)
AST SERPL-CCNC: 26 U/L (ref 0–35)
BASOPHILS # BLD: 0 K/UL (ref 0–0.2)
BASOPHILS NFR BLD: 0.1 %
BILIRUB SERPL-MCNC: 0.4 MG/DL (ref 0.2–0.7)
BUN SERPL-MCNC: 22 MG/DL (ref 8–23)
CALCIUM SERPL-MCNC: 8.9 MG/DL (ref 8.5–9.9)
CHLORIDE SERPL-SCNC: 105 MEQ/L (ref 95–107)
CO2 SERPL-SCNC: 23 MEQ/L (ref 20–31)
CREAT SERPL-MCNC: 1.02 MG/DL (ref 0.5–0.9)
EOSINOPHIL # BLD: 0 K/UL (ref 0–0.7)
EOSINOPHIL NFR BLD: 0.1 %
ERYTHROCYTE [DISTWIDTH] IN BLOOD BY AUTOMATED COUNT: 12.4 % (ref 11.5–14.5)
FERRITIN: 205 NG/ML (ref 13–150)
GLOBULIN SER CALC-MCNC: 2.2 G/DL (ref 2.3–3.5)
GLUCOSE BLD-MCNC: 103 MG/DL (ref 70–99)
GLUCOSE BLD-MCNC: 109 MG/DL (ref 70–99)
GLUCOSE BLD-MCNC: 119 MG/DL (ref 70–99)
GLUCOSE BLD-MCNC: 125 MG/DL (ref 70–99)
GLUCOSE SERPL-MCNC: 119 MG/DL (ref 70–99)
HCT VFR BLD AUTO: 30.8 % (ref 37–47)
HGB BLD-MCNC: 10.8 G/DL (ref 12–16)
IRON % SATURATION: 20 % (ref 20–55)
IRON: 50 UG/DL (ref 37–145)
LYMPHOCYTES # BLD: 0.8 K/UL (ref 1–4.8)
LYMPHOCYTES NFR BLD: 5.5 %
MAGNESIUM SERPL-MCNC: 1.8 MG/DL (ref 1.7–2.4)
MCH RBC QN AUTO: 30.5 PG (ref 27–31.3)
MCHC RBC AUTO-ENTMCNC: 35.1 % (ref 33–37)
MCV RBC AUTO: 87 FL (ref 79.4–94.8)
MONOCYTES # BLD: 0.9 K/UL (ref 0.2–0.8)
MONOCYTES NFR BLD: 6.1 %
NEUTROPHILS # BLD: 12.8 K/UL (ref 1.4–6.5)
NEUTS SEG NFR BLD: 86.8 %
PERFORMED ON: ABNORMAL
PLATELET # BLD AUTO: 133 K/UL (ref 130–400)
POTASSIUM SERPL-SCNC: 3.5 MEQ/L (ref 3.4–4.9)
PROT SERPL-MCNC: 5.8 G/DL (ref 6.3–8)
RBC # BLD AUTO: 3.54 M/UL (ref 4.2–5.4)
SODIUM SERPL-SCNC: 139 MEQ/L (ref 135–144)
TOTAL IRON BINDING CAPACITY: 252 UG/DL (ref 250–450)
UNSATURATED IRON BINDING CAPACITY: 202 UG/DL (ref 112–347)
WBC # BLD AUTO: 14.8 K/UL (ref 4.8–10.8)

## 2024-08-08 PROCEDURE — 6360000002 HC RX W HCPCS: Performed by: INTERNAL MEDICINE

## 2024-08-08 PROCEDURE — 82728 ASSAY OF FERRITIN: CPT

## 2024-08-08 PROCEDURE — 1210000000 HC MED SURG R&B

## 2024-08-08 PROCEDURE — 6370000000 HC RX 637 (ALT 250 FOR IP): Performed by: INTERNAL MEDICINE

## 2024-08-08 PROCEDURE — 97161 PT EVAL LOW COMPLEX 20 MIN: CPT

## 2024-08-08 PROCEDURE — 85025 COMPLETE CBC W/AUTO DIFF WBC: CPT

## 2024-08-08 PROCEDURE — 74018 RADEX ABDOMEN 1 VIEW: CPT

## 2024-08-08 PROCEDURE — 83550 IRON BINDING TEST: CPT

## 2024-08-08 PROCEDURE — 2580000003 HC RX 258: Performed by: NURSE PRACTITIONER

## 2024-08-08 PROCEDURE — 80053 COMPREHEN METABOLIC PANEL: CPT

## 2024-08-08 PROCEDURE — 83540 ASSAY OF IRON: CPT

## 2024-08-08 PROCEDURE — 36415 COLL VENOUS BLD VENIPUNCTURE: CPT

## 2024-08-08 PROCEDURE — 99222 1ST HOSP IP/OBS MODERATE 55: CPT | Performed by: SPECIALIST

## 2024-08-08 PROCEDURE — 2580000003 HC RX 258: Performed by: INTERNAL MEDICINE

## 2024-08-08 PROCEDURE — 6360000002 HC RX W HCPCS: Performed by: NURSE PRACTITIONER

## 2024-08-08 PROCEDURE — 6370000000 HC RX 637 (ALT 250 FOR IP): Performed by: SPECIALIST

## 2024-08-08 PROCEDURE — 83735 ASSAY OF MAGNESIUM: CPT

## 2024-08-08 RX ORDER — SODIUM CHLORIDE, SODIUM LACTATE, POTASSIUM CHLORIDE, CALCIUM CHLORIDE 600; 310; 30; 20 MG/100ML; MG/100ML; MG/100ML; MG/100ML
INJECTION, SOLUTION INTRAVENOUS CONTINUOUS
Status: DISPENSED | OUTPATIENT
Start: 2024-08-08 | End: 2024-08-08

## 2024-08-08 RX ORDER — METRONIDAZOLE 500 MG/100ML
500 INJECTION, SOLUTION INTRAVENOUS EVERY 8 HOURS
Status: DISCONTINUED | OUTPATIENT
Start: 2024-08-08 | End: 2024-08-10

## 2024-08-08 RX ORDER — POTASSIUM CHLORIDE 20 MEQ/1
40 TABLET, EXTENDED RELEASE ORAL ONCE
Status: COMPLETED | OUTPATIENT
Start: 2024-08-08 | End: 2024-08-08

## 2024-08-08 RX ORDER — CIPROFLOXACIN 2 MG/ML
400 INJECTION, SOLUTION INTRAVENOUS EVERY 12 HOURS
Status: DISCONTINUED | OUTPATIENT
Start: 2024-08-08 | End: 2024-08-10

## 2024-08-08 RX ADMIN — CALCIUM CARBONATE-VITAMIN D TAB 500 MG-200 UNIT 1 TABLET: 500-200 TAB at 09:18

## 2024-08-08 RX ADMIN — LEVOTHYROXINE SODIUM 75 MCG: 0.07 TABLET ORAL at 05:23

## 2024-08-08 RX ADMIN — METRONIDAZOLE 500 MG: 500 INJECTION, SOLUTION INTRAVENOUS at 13:37

## 2024-08-08 RX ADMIN — SODIUM CHLORIDE, POTASSIUM CHLORIDE, SODIUM LACTATE AND CALCIUM CHLORIDE: 600; 310; 30; 20 INJECTION, SOLUTION INTRAVENOUS at 13:37

## 2024-08-08 RX ADMIN — CIPROFLOXACIN 400 MG: 200 INJECTION, SOLUTION INTRAVENOUS at 16:34

## 2024-08-08 RX ADMIN — Medication 5 ML: at 19:37

## 2024-08-08 RX ADMIN — POLYETHYLENE GLYCOL 3350, SODIUM SULFATE ANHYDROUS, SODIUM BICARBONATE, SODIUM CHLORIDE, POTASSIUM CHLORIDE 4000 ML: 236; 22.74; 6.74; 5.86; 2.97 POWDER, FOR SOLUTION ORAL at 18:36

## 2024-08-08 RX ADMIN — METRONIDAZOLE 500 MG: 500 TABLET ORAL at 05:23

## 2024-08-08 RX ADMIN — Medication 10 ML: at 09:22

## 2024-08-08 RX ADMIN — METRONIDAZOLE 500 MG: 500 INJECTION, SOLUTION INTRAVENOUS at 21:46

## 2024-08-08 RX ADMIN — ONDANSETRON 4 MG: 2 INJECTION INTRAMUSCULAR; INTRAVENOUS at 12:01

## 2024-08-08 RX ADMIN — ATORVASTATIN CALCIUM 10 MG: 10 TABLET, FILM COATED ORAL at 21:42

## 2024-08-08 RX ADMIN — POTASSIUM CHLORIDE 40 MEQ: 1500 TABLET, EXTENDED RELEASE ORAL at 09:18

## 2024-08-08 ASSESSMENT — PAIN SCALES - GENERAL: PAINLEVEL_OUTOF10: 0

## 2024-08-08 NOTE — PLAN OF CARE
Problem: Discharge Planning  Goal: Discharge to home or other facility with appropriate resources  8/7/2024 2210 by Kendra Quintero RN  Outcome: Progressing  8/7/2024 1008 by Tereso Hyatt RN  Outcome: Progressing  Flowsheets (Taken 8/7/2024 0857)  Discharge to home or other facility with appropriate resources: Identify barriers to discharge with patient and caregiver     Problem: Pain  Goal: Verbalizes/displays adequate comfort level or baseline comfort level  8/7/2024 2210 by Kendra Quintero RN  Outcome: Progressing  8/7/2024 1008 by Tereso Hyatt RN  Outcome: Progressing     Problem: Safety - Adult  Goal: Free from fall injury  8/7/2024 2210 by Kendra Quintero RN  Outcome: Progressing  8/7/2024 1008 by Tereso Hyatt RN  Outcome: Progressing     Problem: Skin/Tissue Integrity  Goal: Absence of new skin breakdown  Description: 1.  Monitor for areas of redness and/or skin breakdown  2.  Assess vascular access sites hourly  3.  Every 4-6 hours minimum:  Change oxygen saturation probe site  4.  Every 4-6 hours:  If on nasal continuous positive airway pressure, respiratory therapy assess nares and determine need for appliance change or resting period.  8/7/2024 2210 by Kendra Quintero RN  Outcome: Progressing  8/7/2024 1008 by Tereso Hyatt RN  Outcome: Progressing     Problem: Occupational Therapy - Adult  Goal: By Discharge: Performs self-care activities at highest level of function for planned discharge setting.  See evaluation for individualized goals.  8/7/2024 0906 by Lanette Figueroa, ROBERT  Outcome: Completed

## 2024-08-08 NOTE — PROGRESS NOTES
2034: pt's /64, HR 76.   2041: Jesse Martin NP, notified via mana.bo, new orders acknowledged. NP also reported that pt had BECKI so he held Lisinopril    2235: recheck /52

## 2024-08-08 NOTE — PLAN OF CARE
Problem: Discharge Planning  Goal: Discharge to home or other facility with appropriate resources  8/8/2024 1035 by Annabella Bright RN  Outcome: Progressing  8/7/2024 2210 by Kendra Quintero RN  Outcome: Progressing     Problem: Pain  Goal: Verbalizes/displays adequate comfort level or baseline comfort level  8/8/2024 1035 by Annabella Bright RN  Outcome: Progressing  8/7/2024 2210 by Kendra Quintero RN  Outcome: Progressing     Problem: Safety - Adult  Goal: Free from fall injury  8/8/2024 1035 by Annabella Bright RN  Outcome: Progressing  8/7/2024 2210 by Kendra Quintero RN  Outcome: Progressing     Problem: Skin/Tissue Integrity  Goal: Absence of new skin breakdown  Description: 1.  Monitor for areas of redness and/or skin breakdown  2.  Assess vascular access sites hourly  3.  Every 4-6 hours minimum:  Change oxygen saturation probe site  4.  Every 4-6 hours:  If on nasal continuous positive airway pressure, respiratory therapy assess nares and determine need for appliance change or resting period.  8/8/2024 1035 by Annabella Bright RN  Outcome: Progressing  8/7/2024 2210 by Kendra Quintero RN  Outcome: Progressing

## 2024-08-08 NOTE — PROGRESS NOTES
independent  Stand to Sit: Modified independent  Comment: cane used    Ambulation  Surface: Level tile  Device: Single point cane  Assistance: Modified Independent  Gait Deviations: Slow Anne  Distance: 30ft    Stairs/Curb  Stairs?: No    Activity Tolerance  Activity Tolerance: Patient tolerated evaluation without incident    Patient Education  Education Given To: Patient  Education Provided: Role of Therapy;Plan of Care  Education Method: Verbal  Education Outcome: Verbalized understanding     ASSESSMENT:   Decision Making: Low Complexity  History: high  Exam: low  Clinical Presentation: low    Therapy Prognosis: Good    DISCHARGE RECOMMENDATIONS:  No Skilled PT:  (pt declined continued PT needs)    Assessment: Pt is 83 y.o. female to hospital due to abdominal pain.  Pt having pain upon arrival for PT eval.  Pt demonstrates mobility at mod indep level with use of cane.  Pt's only barrier is abdominal pain.  No continued PT indicated at this time.  Requires PT Follow-Up: No       PLAN OF CARE:  Physical Therapy Plan  Additional Comments: pt at mod indep level; pt declined continued PT needs    Safety Devices  Type of Devices: All fall risk precautions in place, Bed alarm in place, Call light within reach, Nurse notified    Goals:  Long Term Goals  Long Term Goal 1: none indicated    [facility] (6 CLICK) BASIC MOBILITY  AM-PAC Inpatient Mobility Raw Score : 22     Therapy Time:   Individual   Time In 1131   Time Out 1142   Minutes 11       Eval x 11 min    Fauzia Larsen PT, 08/08/24 at 11:43 AM         Definitions for assistance levels  Independent = pt does not require any physical supervision or assistance from another person for activity completion. Device may be needed.  Stand by assistance = pt requires verbal cues or instructions from another person, close to but not touching, to perform the activity  Minimal assistance= pt performs 75% or more of the activity; assistance is required to complete the  activity  Moderate assistance= pt performs 50% of the activity; assistance is required to complete the activity  Maximal assistance = pt performs 25% of the activity; assistance is required to complete the activity  Dependent = pt requires total physical assistance to accomplish the task

## 2024-08-08 NOTE — CONSULTS
Consults    Patient Name: Deidra Caban  Admit Date: 2024  6:14 PM  MR #: [id number]  : 1940    Attending Physician: Davidson Lott DO  Reason for consult: Abdominal discomfort and constipation    History of Presenting Illness:      Deidra Caban is a 83 y.o. female on hospital day 3 with a history of abdominal discomfort and constipation, it seems patient has chronic constipation and patient was experiencing fatigue diaphoresis and abdominal discomfort.  Has nausea but no emesis.  Patient was put on MiraLAX and stool softeners with passage of some stool.  Had 1 episode of mild rectal bleeding earlier today.  Patient had a CT scan done that showed colitis.  Reports abdominal discomfort and bloating. History Obtained From:  patient      History:      Past Medical History:   Diagnosis Date    Allergic rhinitis     Carotid artery occlusion     Carpal tunnel syndrome, bilateral     Colitis, ischemic (HCC)     DJD (degenerative joint disease)     H/O: psoriasis     History of bad fall     MULTIPLE CONTUSIONS    History of diverticulitis of colon     History of kidney stones     History of mammography, screening     CAT 2    History of osteopenia     History of shingles     Hyperlipidemia     Hypertension     Hypothyroidism     Renal failure     MILD     Past Surgical History:   Procedure Laterality Date    BREAST SURGERY  80'S    R BREAST LUMPECTOMY    COLONOSCOPY  2010    SIGMOID DIVERTICULOSIS    HYSTERECTOMY (CERVIX STATUS UNKNOWN)  -PARTIAL    BENIGN TUMOR    JOINT REPLACEMENT Right 2012    right knee    JOINT REPLACEMENT Left 2021    left knee    KNEE ARTHROSCOPY  -RIGHT    TONSILLECTOMY      UPPER GASTROINTESTINAL ENDOSCOPY  2010    GEN NORMAL,GASTRIC MUCOSAL ERYTHEMIA       Family History  Family History   Problem Relation Age of Onset    Cancer Mother         BREAST    Diabetes Mother     Heart Attack Father     Cancer Sister         BREAST  evidence of bowel obstruction.  There is mild wall thickening of the distal colon.  There are scattered diverticula.  The appendix is not visualized.  There is fluid throughout the colon. Pelvis: The urinary bladder is largely decompressed.  There is mild circumferential bladder wall thickening.  The uterus is absent. Peritoneum/Retroperitoneum: No evidence of ascites or free air.  No evidence of lymphadenopathy.  Aorta is normal in caliber. Bones/Soft Tissues:  No acute abnormality of the visualized osseous structures.  No focal soft tissue abnormality.     1. Mild wall thickening of the distal colon with fluid throughout the colon, suggestive of colitis. 2. Mild circumferential bladder wall thickening may be secondary to underdistention or cystitis. 3. Cholelithiasis without CT evidence of acute cholecystitis. 4. Tiny hiatal hernia.        Impression:   83-year-old female admitted abdominal discomfort constipation, had 1 episode of mild rectal bleeding today and CT of the abdomen showed colitis.  Labs shows normal electrolytes BUN is 22 creatinine is 1.02 LFTs are unremarkable white count of 14.8 hemoglobin is 10.8 hematocrit 30.8, possibilities include diverticulosis, ischemic bowel disease or neoplasm.  Plan:   Will schedule colonoscopy tomorrow.  Spoke with patient and her daughters and they are agreeable  Comments:     Thank you for allowing us to participate in the care of this patient.     Will continue to follow.    Please call if questions or concerns arise.    Electronically signed by Isabelle Smith MD on 8/8/2024 at 5:45 PM

## 2024-08-08 NOTE — PROGRESS NOTES
Physician Progress Note    8/8/2024   12:36 PM    Name:  Deidra Caban  MRN:    [id number]      Day: 3     Admit Date: 8/5/2024  6:14 PM  PCP: Tamika Sharp DO    Code Status:  Full Code    Subjective:     Recurrent lower abdominal discomfort today.  She moved her bowels this morning.  In the few times she moved her bowels last night and this morning, there was some blood-tinged.  She also admits to nausea.    Current Facility-Administered Medications   Medication Dose Route Frequency Provider Last Rate Last Admin    labetalol (NORMODYNE;TRANDATE) injection 10 mg  10 mg IntraVENous Q4H PRN Bolzan-Roche, Niconadira APRN - CNP        hydrALAZINE (APRESOLINE) injection 10 mg  10 mg IntraVENous Q6H PRN Bolzan-Jesse Martin APRN - CNP   10 mg at 08/07/24 2131    sodium chloride flush 0.9 % injection 5-40 mL  5-40 mL IntraVENous 2 times per day Albaro-Jesse Martin APRN - CNP   10 mL at 08/08/24 0922    sodium chloride flush 0.9 % injection 5-40 mL  5-40 mL IntraVENous PRN Bolzan-Jesse Martin APRN - CNP        0.9 % sodium chloride infusion   IntraVENous PRN Bolzan-Jesse Martin APRN - CNP        [Held by provider] enoxaparin Sodium (LOVENOX) injection 30 mg  30 mg SubCUTAneous Daily Bolzan-Jesse Martin APRN - CNP        ondansetron (ZOFRAN-ODT) disintegrating tablet 4 mg  4 mg Oral Q8H PRN Yanethzan-Jesse Martin APRN - CNP        Or    ondansetron (ZOFRAN) injection 4 mg  4 mg IntraVENous Q6H PRN Bolzan-Jesse Martin APRN - CNP   4 mg at 08/08/24 1201    polyethylene glycol (GLYCOLAX) packet 17 g  17 g Oral Daily PRN Bolzan-Jesse Martin APRN - CNP        acetaminophen (TYLENOL) tablet 650 mg  650 mg Oral Q6H PRN Bolzan-Jesse Martin APRN - CNP   650 mg at 08/07/24 2040    Or    acetaminophen (TYLENOL) suppository 650 mg  650 mg Rectal Q6H PRN Jesse Dykes APRN - CNP        [Held by provider] aspirin EC tablet 81 mg  81 mg Oral BID Davidson Lott, DO

## 2024-08-09 ENCOUNTER — PREP FOR PROCEDURE (OUTPATIENT)
Dept: GASTROENTEROLOGY | Age: 84
End: 2024-08-09

## 2024-08-09 ENCOUNTER — APPOINTMENT (OUTPATIENT)
Dept: ULTRASOUND IMAGING | Age: 84
DRG: 392 | End: 2024-08-09
Payer: MEDICARE

## 2024-08-09 ENCOUNTER — ANESTHESIA EVENT (OUTPATIENT)
Dept: ENDOSCOPY | Age: 84
End: 2024-08-09
Payer: MEDICARE

## 2024-08-09 ENCOUNTER — ANESTHESIA (OUTPATIENT)
Dept: ENDOSCOPY | Age: 84
End: 2024-08-09
Payer: MEDICARE

## 2024-08-09 PROBLEM — K92.2 GI BLEEDING: Status: ACTIVE | Noted: 2024-08-05

## 2024-08-09 LAB
ALBUMIN SERPL-MCNC: 3.2 G/DL (ref 3.5–4.6)
ALP SERPL-CCNC: 45 U/L (ref 40–130)
ALT SERPL-CCNC: 12 U/L (ref 0–33)
ANION GAP SERPL CALCULATED.3IONS-SCNC: 9 MEQ/L (ref 9–15)
AST SERPL-CCNC: 26 U/L (ref 0–35)
BASOPHILS # BLD: 0 K/UL (ref 0–0.2)
BASOPHILS NFR BLD: 0.3 %
BILIRUB SERPL-MCNC: 0.3 MG/DL (ref 0.2–0.7)
BUN SERPL-MCNC: 16 MG/DL (ref 8–23)
CALCIUM SERPL-MCNC: 8.2 MG/DL (ref 8.5–9.9)
CHLORIDE SERPL-SCNC: 104 MEQ/L (ref 95–107)
CO2 SERPL-SCNC: 23 MEQ/L (ref 20–31)
CREAT SERPL-MCNC: 0.99 MG/DL (ref 0.5–0.9)
EOSINOPHIL # BLD: 0.1 K/UL (ref 0–0.7)
EOSINOPHIL NFR BLD: 0.8 %
ERYTHROCYTE [DISTWIDTH] IN BLOOD BY AUTOMATED COUNT: 12.3 % (ref 11.5–14.5)
GLOBULIN SER CALC-MCNC: 2 G/DL (ref 2.3–3.5)
GLUCOSE BLD-MCNC: 104 MG/DL (ref 70–99)
GLUCOSE BLD-MCNC: 113 MG/DL (ref 70–99)
GLUCOSE BLD-MCNC: 89 MG/DL (ref 70–99)
GLUCOSE SERPL-MCNC: 118 MG/DL (ref 70–99)
HCT VFR BLD AUTO: 28.5 % (ref 37–47)
HGB BLD-MCNC: 10 G/DL (ref 12–16)
LYMPHOCYTES # BLD: 1 K/UL (ref 1–4.8)
LYMPHOCYTES NFR BLD: 10.6 %
MAGNESIUM SERPL-MCNC: 1.7 MG/DL (ref 1.7–2.4)
MCH RBC QN AUTO: 30.8 PG (ref 27–31.3)
MCHC RBC AUTO-ENTMCNC: 35.1 % (ref 33–37)
MCV RBC AUTO: 87.7 FL (ref 79.4–94.8)
MONOCYTES # BLD: 0.7 K/UL (ref 0.2–0.8)
MONOCYTES NFR BLD: 7.4 %
NEUTROPHILS # BLD: 7.7 K/UL (ref 1.4–6.5)
NEUTS SEG NFR BLD: 80 %
PERFORMED ON: ABNORMAL
PERFORMED ON: ABNORMAL
PERFORMED ON: NORMAL
PLATELET # BLD AUTO: 136 K/UL (ref 130–400)
POTASSIUM SERPL-SCNC: 3.4 MEQ/L (ref 3.4–4.9)
PROT SERPL-MCNC: 5.2 G/DL (ref 6.3–8)
RBC # BLD AUTO: 3.25 M/UL (ref 4.2–5.4)
SODIUM SERPL-SCNC: 136 MEQ/L (ref 135–144)
WBC # BLD AUTO: 9.7 K/UL (ref 4.8–10.8)

## 2024-08-09 PROCEDURE — 2500000003 HC RX 250 WO HCPCS: Performed by: REGISTERED NURSE

## 2024-08-09 PROCEDURE — 88305 TISSUE EXAM BY PATHOLOGIST: CPT

## 2024-08-09 PROCEDURE — 3700000000 HC ANESTHESIA ATTENDED CARE: Performed by: SPECIALIST

## 2024-08-09 PROCEDURE — 93975 VASCULAR STUDY: CPT

## 2024-08-09 PROCEDURE — 85025 COMPLETE CBC W/AUTO DIFF WBC: CPT

## 2024-08-09 PROCEDURE — 2580000003 HC RX 258: Performed by: INTERNAL MEDICINE

## 2024-08-09 PROCEDURE — 3609010300 HC COLONOSCOPY W/BIOPSY SINGLE/MULTIPLE: Performed by: SPECIALIST

## 2024-08-09 PROCEDURE — 1210000000 HC MED SURG R&B

## 2024-08-09 PROCEDURE — 6360000002 HC RX W HCPCS: Performed by: REGISTERED NURSE

## 2024-08-09 PROCEDURE — 7100000010 HC PHASE II RECOVERY - FIRST 15 MIN: Performed by: SPECIALIST

## 2024-08-09 PROCEDURE — 2580000003 HC RX 258: Performed by: SPECIALIST

## 2024-08-09 PROCEDURE — 6370000000 HC RX 637 (ALT 250 FOR IP): Performed by: INTERNAL MEDICINE

## 2024-08-09 PROCEDURE — 6360000002 HC RX W HCPCS: Performed by: INTERNAL MEDICINE

## 2024-08-09 PROCEDURE — 0DBM8ZX EXCISION OF DESCENDING COLON, VIA NATURAL OR ARTIFICIAL OPENING ENDOSCOPIC, DIAGNOSTIC: ICD-10-PCS | Performed by: SPECIALIST

## 2024-08-09 PROCEDURE — 83735 ASSAY OF MAGNESIUM: CPT

## 2024-08-09 PROCEDURE — 3700000001 HC ADD 15 MINUTES (ANESTHESIA): Performed by: SPECIALIST

## 2024-08-09 PROCEDURE — 36415 COLL VENOUS BLD VENIPUNCTURE: CPT

## 2024-08-09 PROCEDURE — 7100000011 HC PHASE II RECOVERY - ADDTL 15 MIN: Performed by: SPECIALIST

## 2024-08-09 PROCEDURE — 80053 COMPREHEN METABOLIC PANEL: CPT

## 2024-08-09 PROCEDURE — 2709999900 HC NON-CHARGEABLE SUPPLY: Performed by: SPECIALIST

## 2024-08-09 PROCEDURE — 2580000003 HC RX 258: Performed by: NURSE PRACTITIONER

## 2024-08-09 PROCEDURE — 45380 COLONOSCOPY AND BIOPSY: CPT | Performed by: SPECIALIST

## 2024-08-09 RX ORDER — SODIUM CHLORIDE 0.9 % (FLUSH) 0.9 %
5-40 SYRINGE (ML) INJECTION PRN
Status: DISCONTINUED | OUTPATIENT
Start: 2024-08-09 | End: 2024-08-09 | Stop reason: HOSPADM

## 2024-08-09 RX ORDER — SODIUM CHLORIDE, SODIUM LACTATE, POTASSIUM CHLORIDE, CALCIUM CHLORIDE 600; 310; 30; 20 MG/100ML; MG/100ML; MG/100ML; MG/100ML
INJECTION, SOLUTION INTRAVENOUS CONTINUOUS
Status: DISPENSED | OUTPATIENT
Start: 2024-08-09 | End: 2024-08-09

## 2024-08-09 RX ORDER — PROPOFOL 10 MG/ML
INJECTION, EMULSION INTRAVENOUS PRN
Status: DISCONTINUED | OUTPATIENT
Start: 2024-08-09 | End: 2024-08-09 | Stop reason: SDUPTHER

## 2024-08-09 RX ORDER — SODIUM CHLORIDE 0.9 % (FLUSH) 0.9 %
5-40 SYRINGE (ML) INJECTION EVERY 12 HOURS SCHEDULED
Status: DISCONTINUED | OUTPATIENT
Start: 2024-08-09 | End: 2024-08-09 | Stop reason: HOSPADM

## 2024-08-09 RX ORDER — SODIUM CHLORIDE 9 MG/ML
25 INJECTION, SOLUTION INTRAVENOUS PRN
Status: DISCONTINUED | OUTPATIENT
Start: 2024-08-09 | End: 2024-08-09 | Stop reason: HOSPADM

## 2024-08-09 RX ORDER — POTASSIUM CHLORIDE 20 MEQ/1
40 TABLET, EXTENDED RELEASE ORAL
Status: COMPLETED | OUTPATIENT
Start: 2024-08-09 | End: 2024-08-09

## 2024-08-09 RX ORDER — LIDOCAINE HYDROCHLORIDE 20 MG/ML
INJECTION, SOLUTION INFILTRATION; PERINEURAL PRN
Status: DISCONTINUED | OUTPATIENT
Start: 2024-08-09 | End: 2024-08-09 | Stop reason: SDUPTHER

## 2024-08-09 RX ORDER — MAGNESIUM SULFATE IN WATER 40 MG/ML
2000 INJECTION, SOLUTION INTRAVENOUS ONCE
Status: COMPLETED | OUTPATIENT
Start: 2024-08-09 | End: 2024-08-09

## 2024-08-09 RX ADMIN — PROPOFOL 50 MG: 10 INJECTION, EMULSION INTRAVENOUS at 14:28

## 2024-08-09 RX ADMIN — SODIUM CHLORIDE 25 ML: 9 INJECTION, SOLUTION INTRAVENOUS at 14:05

## 2024-08-09 RX ADMIN — POTASSIUM CHLORIDE 40 MEQ: 1500 TABLET, EXTENDED RELEASE ORAL at 17:36

## 2024-08-09 RX ADMIN — Medication 10 ML: at 23:15

## 2024-08-09 RX ADMIN — CIPROFLOXACIN 400 MG: 200 INJECTION, SOLUTION INTRAVENOUS at 04:41

## 2024-08-09 RX ADMIN — ATORVASTATIN CALCIUM 10 MG: 10 TABLET, FILM COATED ORAL at 23:15

## 2024-08-09 RX ADMIN — CALCIUM CARBONATE-VITAMIN D TAB 500 MG-200 UNIT 1 TABLET: 500-200 TAB at 17:36

## 2024-08-09 RX ADMIN — LEVOTHYROXINE SODIUM 75 MCG: 0.07 TABLET ORAL at 06:09

## 2024-08-09 RX ADMIN — Medication 10 ML: at 08:35

## 2024-08-09 RX ADMIN — LIDOCAINE HYDROCHLORIDE 60 MG: 20 INJECTION, SOLUTION INFILTRATION; PERINEURAL at 14:19

## 2024-08-09 RX ADMIN — SODIUM CHLORIDE, POTASSIUM CHLORIDE, SODIUM LACTATE AND CALCIUM CHLORIDE: 600; 310; 30; 20 INJECTION, SOLUTION INTRAVENOUS at 17:31

## 2024-08-09 RX ADMIN — PROPOFOL 50 MG: 10 INJECTION, EMULSION INTRAVENOUS at 14:33

## 2024-08-09 RX ADMIN — MAGNESIUM SULFATE HEPTAHYDRATE 2000 MG: 40 INJECTION, SOLUTION INTRAVENOUS at 11:06

## 2024-08-09 RX ADMIN — PROPOFOL 100 MG: 10 INJECTION, EMULSION INTRAVENOUS at 14:19

## 2024-08-09 RX ADMIN — METRONIDAZOLE 500 MG: 500 INJECTION, SOLUTION INTRAVENOUS at 06:09

## 2024-08-09 RX ADMIN — PROPOFOL 50 MG: 10 INJECTION, EMULSION INTRAVENOUS at 14:24

## 2024-08-09 RX ADMIN — CIPROFLOXACIN 400 MG: 200 INJECTION, SOLUTION INTRAVENOUS at 17:33

## 2024-08-09 RX ADMIN — METRONIDAZOLE 500 MG: 500 INJECTION, SOLUTION INTRAVENOUS at 17:33

## 2024-08-09 ASSESSMENT — PAIN SCALES - GENERAL: PAINLEVEL_OUTOF10: 0

## 2024-08-09 ASSESSMENT — PAIN - FUNCTIONAL ASSESSMENT
PAIN_FUNCTIONAL_ASSESSMENT: NONE - DENIES PAIN
PAIN_FUNCTIONAL_ASSESSMENT: 0-10

## 2024-08-09 NOTE — CARE COORDINATION
MET WITH PATIENT TO DISCUSS DISCHARGE NEEDS. DISCHARGE PLAN REMAINS HOME NO NEEDS WHEN MEDICALLY CLEARED.

## 2024-08-09 NOTE — ANESTHESIA POSTPROCEDURE EVALUATION
Department of Anesthesiology  Postprocedure Note    Patient: Deidra Caban  MRN: [id number]  YOB: 1940  Date of evaluation: 8/9/2024    Procedure Summary       Date: 08/09/24 Room / Location: [facility] OR 02 / [facility]    Anesthesia Start: 1416 Anesthesia Stop: 1435    Procedure: COLONOSCOPY with BIOPSY Diagnosis:       GI bleeding      (GI bleeding [K92.2])    Surgeons: Isabelle Smith MD Responsible Provider: Tejas Atwood APRN - CRNA    Anesthesia Type: MAC ASA Status: 3            Anesthesia Type: No value filed.    Lucretia Phase I: Lucretia Score: 10    Lucretia Phase II:      Anesthesia Post Evaluation    Patient location during evaluation: bedside  Patient participation: complete - patient participated  Level of consciousness: awake and awake and alert  Airway patency: patent  Nausea & Vomiting: no nausea and no vomiting  Cardiovascular status: blood pressure returned to baseline and hemodynamically stable  Respiratory status: acceptable  Hydration status: euvolemic  Pain management: adequate        No notable events documented.

## 2024-08-09 NOTE — PLAN OF CARE
Problem: Discharge Planning  Goal: Discharge to home or other facility with appropriate resources  8/9/2024 1037 by Annabella Bright RN  Outcome: Progressing  8/9/2024 0227 by Senait Adame RN  Outcome: Progressing     Problem: Pain  Goal: Verbalizes/displays adequate comfort level or baseline comfort level  8/9/2024 1037 by Annabella Bright RN  Outcome: Progressing  8/9/2024 0227 by Senait Adame RN  Outcome: Progressing     Problem: Safety - Adult  Goal: Free from fall injury  8/9/2024 1037 by Annabella Bright RN  Outcome: Progressing  8/9/2024 0227 by Senait Adame RN  Outcome: Progressing     Problem: Skin/Tissue Integrity  Goal: Absence of new skin breakdown  Description: 1.  Monitor for areas of redness and/or skin breakdown  2.  Assess vascular access sites hourly  3.  Every 4-6 hours minimum:  Change oxygen saturation probe site  4.  Every 4-6 hours:  If on nasal continuous positive airway pressure, respiratory therapy assess nares and determine need for appliance change or resting period.  8/9/2024 1037 by Annabella Bright RN  Outcome: Progressing  8/9/2024 0227 by Senait Adame RN  Outcome: Progressing

## 2024-08-09 NOTE — PROGRESS NOTES
Physician Progress Note    8/9/2024   2:47 PM    Name:  Deidra Caban  MRN:    [id number]      Day: 4     Admit Date: 8/5/2024  6:14 PM  PCP: Tamika Sharp DO    Code Status:  Full Code    Subjective:     Feels better today.  No abdominal discomfort.  No nausea or vomiting.  Going for colonoscopy shortly    Current Facility-Administered Medications   Medication Dose Route Frequency Provider Last Rate Last Admin    sodium chloride flush 0.9 % injection 5-40 mL  5-40 mL IntraVENous 2 times per day Isabelle Smith MD        sodium chloride flush 0.9 % injection 5-40 mL  5-40 mL IntraVENous PRN Isabelle Smith MD        0.9 % sodium chloride infusion  25 mL IntraVENous PRN Isabelle Smith MD   Stopped at 08/09/24 1445    potassium chloride (KLOR-CON M) extended release tablet 40 mEq  40 mEq Oral Dinner Davidson Lott DO        metroNIDAZOLE (FLAGYL) 500 mg in 0.9% NaCl 100 mL IVPB premix  500 mg IntraVENous Q8H Davidson Lott DO   Stopped at 08/09/24 0709    ciprofloxacin (CIPRO) IVPB 400 mg  400 mg IntraVENous Q12H Davidson Lott DO   Stopped at 08/09/24 0554    labetalol (NORMODYNE;TRANDATE) injection 10 mg  10 mg IntraVENous Q4H PRN Bolzan-Roche Nicodaniloo, APRN - CNP        hydrALAZINE (APRESOLINE) injection 10 mg  10 mg IntraVENous Q6H PRN Bolzan-RocheJesse, APRN - CNP   10 mg at 08/07/24 2131    sodium chloride flush 0.9 % injection 5-40 mL  5-40 mL IntraVENous 2 times per day Bolzan-Roche Nicoletto, APRN - CNP   10 mL at 08/09/24 0835    sodium chloride flush 0.9 % injection 5-40 mL  5-40 mL IntraVENous PRN Bolzan-Roche Nicodaniloo, APRN - CNP        0.9 % sodium chloride infusion   IntraVENous PRN Bolzan-Roche Nicoletto, APRN - CNP        [Held by provider] enoxaparin Sodium (LOVENOX) injection 30 mg  30 mg SubCUTAneous Daily Bolzan-Roche, BO Molina - CNP        ondansetron (ZOFRAN-ODT) disintegrating tablet 4 mg  4 mg Oral Q8H PRN Jesse Dykes APRN - CNP

## 2024-08-09 NOTE — ANESTHESIA PRE PROCEDURE
status: Never    Smokeless tobacco: Never   Substance Use Topics    Alcohol use: No                                Counseling given: Not Answered      Vital Signs (Current):   Vitals:    08/08/24 1923 08/09/24 0228 08/09/24 0600 08/09/24 0714   BP: (!) 155/63 (!) 140/67  126/67   Pulse: 65 57  61   Resp: 16 18  16   Temp: 36.6 °C (97.9 °F) 36.4 °C (97.5 °F)  36.7 °C (98.1 °F)   TempSrc: Oral Oral  Oral   SpO2: 98% 98%  96%   Weight:   77.1 kg (170 lb)    Height:                                                  BP Readings from Last 3 Encounters:   08/09/24 126/67   04/09/24 124/70   04/09/24 124/70       NPO Status:                                                                                 BMI:   Wt Readings from Last 3 Encounters:   08/09/24 77.1 kg (170 lb)   04/09/24 76.7 kg (169 lb)   04/09/24 76.7 kg (169 lb)     Body mass index is 33.2 kg/m².    CBC:   Lab Results   Component Value Date/Time    WBC 9.7 08/09/2024 05:30 AM    RBC 3.25 08/09/2024 05:30 AM    RBC 4.26 11/23/2011 10:20 AM    HGB 10.0 08/09/2024 05:30 AM    HCT 28.5 08/09/2024 05:30 AM    MCV 87.7 08/09/2024 05:30 AM    RDW 12.3 08/09/2024 05:30 AM     08/09/2024 05:30 AM       CMP:   Lab Results   Component Value Date/Time     08/09/2024 05:30 AM    K 3.4 08/09/2024 05:30 AM     08/09/2024 05:30 AM    CO2 23 08/09/2024 05:30 AM    BUN 16 08/09/2024 05:30 AM    CREATININE 0.99 08/09/2024 05:30 AM    GFRAA >60.0 01/20/2022 10:42 AM    LABGLOM 55.6 08/09/2024 05:30 AM    LABGLOM 53.1 04/05/2024 09:52 AM    GLUCOSE 118 08/09/2024 05:30 AM    GLUCOSE 170 06/02/2021 05:48 AM    CALCIUM 8.2 08/09/2024 05:30 AM    BILITOT 0.3 08/09/2024 05:30 AM    ALKPHOS 45 08/09/2024 05:30 AM    AST 26 08/09/2024 05:30 AM    ALT 12 08/09/2024 05:30 AM       POC Tests:   Recent Labs     08/09/24  1116   POCGLU 104*       Coags:   Lab Results   Component Value Date/Time    PROTIME 12.5 05/18/2021 12:03 PM    INR 1.1 05/18/2021 12:03 PM    APTT 28

## 2024-08-09 NOTE — PROGRESS NOTES
Shift assessment complete. A&O X 4. Medications given per MAR. Call light within reach. No other needs stated by patient at this time.    Patient has drank most of bowel prep and is having clear BM's now.

## 2024-08-10 LAB
ALBUMIN SERPL-MCNC: 3.1 G/DL (ref 3.5–4.6)
ALP SERPL-CCNC: 47 U/L (ref 40–130)
ALT SERPL-CCNC: 15 U/L (ref 0–33)
ANION GAP SERPL CALCULATED.3IONS-SCNC: 7 MEQ/L (ref 9–15)
AST SERPL-CCNC: 31 U/L (ref 0–35)
BASOPHILS # BLD: 0.1 K/UL (ref 0–0.2)
BASOPHILS NFR BLD: 0.8 %
BILIRUB SERPL-MCNC: <0.2 MG/DL (ref 0.2–0.7)
BUN SERPL-MCNC: 12 MG/DL (ref 8–23)
CALCIUM SERPL-MCNC: 8.3 MG/DL (ref 8.5–9.9)
CHLORIDE SERPL-SCNC: 110 MEQ/L (ref 95–107)
CO2 SERPL-SCNC: 23 MEQ/L (ref 20–31)
CREAT SERPL-MCNC: 0.99 MG/DL (ref 0.5–0.9)
EOSINOPHIL # BLD: 0.2 K/UL (ref 0–0.7)
EOSINOPHIL NFR BLD: 3.2 %
ERYTHROCYTE [DISTWIDTH] IN BLOOD BY AUTOMATED COUNT: 12.4 % (ref 11.5–14.5)
GLOBULIN SER CALC-MCNC: 2.2 G/DL (ref 2.3–3.5)
GLUCOSE BLD-MCNC: 102 MG/DL (ref 70–99)
GLUCOSE BLD-MCNC: 103 MG/DL (ref 70–99)
GLUCOSE BLD-MCNC: 108 MG/DL (ref 70–99)
GLUCOSE BLD-MCNC: 93 MG/DL (ref 70–99)
GLUCOSE SERPL-MCNC: 87 MG/DL (ref 70–99)
HCT VFR BLD AUTO: 30.6 % (ref 37–47)
HGB BLD-MCNC: 10.5 G/DL (ref 12–16)
LYMPHOCYTES # BLD: 1.3 K/UL (ref 1–4.8)
LYMPHOCYTES NFR BLD: 17.6 %
MAGNESIUM SERPL-MCNC: 2.1 MG/DL (ref 1.7–2.4)
MCH RBC QN AUTO: 30.5 PG (ref 27–31.3)
MCHC RBC AUTO-ENTMCNC: 34.3 % (ref 33–37)
MCV RBC AUTO: 89 FL (ref 79.4–94.8)
MONOCYTES # BLD: 0.8 K/UL (ref 0.2–0.8)
MONOCYTES NFR BLD: 11.3 %
NEUTROPHILS # BLD: 4.7 K/UL (ref 1.4–6.5)
NEUTS SEG NFR BLD: 65.3 %
PERFORMED ON: ABNORMAL
PERFORMED ON: NORMAL
PLATELET # BLD AUTO: 146 K/UL (ref 130–400)
POTASSIUM SERPL-SCNC: 4.1 MEQ/L (ref 3.4–4.9)
PROT SERPL-MCNC: 5.3 G/DL (ref 6.3–8)
RBC # BLD AUTO: 3.44 M/UL (ref 4.2–5.4)
SODIUM SERPL-SCNC: 140 MEQ/L (ref 135–144)
WBC # BLD AUTO: 7.2 K/UL (ref 4.8–10.8)

## 2024-08-10 PROCEDURE — 36415 COLL VENOUS BLD VENIPUNCTURE: CPT

## 2024-08-10 PROCEDURE — 6370000000 HC RX 637 (ALT 250 FOR IP): Performed by: INTERNAL MEDICINE

## 2024-08-10 PROCEDURE — 80053 COMPREHEN METABOLIC PANEL: CPT

## 2024-08-10 PROCEDURE — 2580000003 HC RX 258: Performed by: NURSE PRACTITIONER

## 2024-08-10 PROCEDURE — 1210000000 HC MED SURG R&B

## 2024-08-10 PROCEDURE — 85025 COMPLETE CBC W/AUTO DIFF WBC: CPT

## 2024-08-10 PROCEDURE — 83735 ASSAY OF MAGNESIUM: CPT

## 2024-08-10 PROCEDURE — 6360000002 HC RX W HCPCS: Performed by: INTERNAL MEDICINE

## 2024-08-10 PROCEDURE — 99232 SBSQ HOSP IP/OBS MODERATE 35: CPT | Performed by: INTERNAL MEDICINE

## 2024-08-10 RX ORDER — CIPROFLOXACIN 500 MG/1
500 TABLET, FILM COATED ORAL 2 TIMES DAILY
Qty: 14 TABLET | Refills: 0 | Status: SHIPPED | OUTPATIENT
Start: 2024-08-10 | End: 2024-08-17

## 2024-08-10 RX ORDER — METRONIDAZOLE 500 MG/1
500 TABLET ORAL EVERY 8 HOURS SCHEDULED
Qty: 21 TABLET | Refills: 0 | Status: SHIPPED | OUTPATIENT
Start: 2024-08-10 | End: 2024-08-17

## 2024-08-10 RX ORDER — ASPIRIN 81 MG/1
81 TABLET ORAL DAILY
COMMUNITY
Start: 2024-08-10

## 2024-08-10 RX ADMIN — CALCIUM CARBONATE-VITAMIN D TAB 500 MG-200 UNIT 1 TABLET: 500-200 TAB at 09:07

## 2024-08-10 RX ADMIN — METRONIDAZOLE 500 MG: 500 INJECTION, SOLUTION INTRAVENOUS at 02:06

## 2024-08-10 RX ADMIN — METRONIDAZOLE 500 MG: 500 TABLET ORAL at 21:58

## 2024-08-10 RX ADMIN — LEVOTHYROXINE SODIUM 75 MCG: 0.07 TABLET ORAL at 07:01

## 2024-08-10 RX ADMIN — CIPROFLOXACIN 400 MG: 200 INJECTION, SOLUTION INTRAVENOUS at 03:18

## 2024-08-10 RX ADMIN — Medication 10 ML: at 10:38

## 2024-08-10 RX ADMIN — Medication 5 ML: at 21:59

## 2024-08-10 RX ADMIN — ATORVASTATIN CALCIUM 10 MG: 10 TABLET, FILM COATED ORAL at 21:58

## 2024-08-10 RX ADMIN — METRONIDAZOLE 500 MG: 500 INJECTION, SOLUTION INTRAVENOUS at 09:07

## 2024-08-10 ASSESSMENT — PAIN SCALES - GENERAL: PAINLEVEL_OUTOF10: 0

## 2024-08-10 NOTE — DISCHARGE SUMMARY
[facility] Hospital Medicine Discharge Summary    Deidra Caban  :  1940  MRN:  [id number]    Admit date:  2024  Discharge date:  8/10/2024    Admitting Physician:  Yobani Cortez MD  Primary Care Physician:  Tamika Sharp DO    Discharge Diagnoses:    Principal Problem:    Colitis  Active Problems:    Leukocytosis  Resolved Problems:    * No resolved hospital problems. *    Chief Complaint   Patient presents with    Illness       Condition: improved   Activity: no restrictions  Diet: regular  Disposition: home  Functional Status: ambulatory    Significant Findings:     Recent Labs     08/10/24  0549 24  0530 24  0531   WBC 7.2 9.7 14.8*   HGB 10.5* 10.0* 10.8*   HCT 30.6* 28.5* 30.8*   MCV 89.0 87.7 87.0    136 133     CT Abd/Pelvis:  IMPRESSION:  1. Mild wall thickening of the distal colon with fluid throughout the colon,  suggestive of colitis.  2. Mild circumferential bladder wall thickening may be secondary to  underdistention or cystitis.  3. Cholelithiasis without CT evidence of acute cholecystitis.  4. Tiny hiatal hernia.    Colonoscopy:         -  Moderate diverticulosis in the sigmoid colon and in the descending colon. There was no evidence of diverticular bleeding.          -  Ulcerated, erythematous, congested, inflamed and hemorrhagic mucosa in the sigmoid colon, in the descending colon and in the transverse colon.          - Mucosa of the sigmoid and descending colon showed edema erythema and hemorrhagic areas, multiple ulcerations were seen in the transverse and part of  the descending colon.  Some of the ulcerations were linear.  Biopsies were taken .          -  Internal hemorrhoids.          -  No specimens collected.          - Colitis involving the sigmoid descending and transverse colon, predominantly  transverse and part of the descending colon.  Most probably ischemic colitis.          - Internal hemorrhoid       Hospital Course:

## 2024-08-10 NOTE — PROGRESS NOTES
concerning for colitis.  Patient s/p colonoscopy with multiple segments showing mucosal changes concerning for ischemic colitis.  Biopsy obtained histology pending.  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  Colonic changes would be secondary to changes in the SMA or the JAMARCUS.    PLAN :  -Supportive care at this time, intravenous hydration  -Patient has improved significantly both clinically and labs.  -May switch to oral antibiotics  -Recommend further consultation with vascular surgery to evaluate Doppler changes and current clinical presentation.  This may be performed on outpatient basis    Thank you for allowing me to participate in the care of your patient.  Please feel free to contact me with any concerns.    Yao Taylor MD

## 2024-08-11 VITALS
HEIGHT: 62 IN | OXYGEN SATURATION: 96 % | TEMPERATURE: 97.7 F | SYSTOLIC BLOOD PRESSURE: 141 MMHG | WEIGHT: 170 LBS | HEART RATE: 62 BPM | DIASTOLIC BLOOD PRESSURE: 61 MMHG | RESPIRATION RATE: 16 BRPM | BODY MASS INDEX: 31.28 KG/M2

## 2024-08-11 LAB
ALBUMIN SERPL-MCNC: 3.3 G/DL (ref 3.5–4.6)
ALP SERPL-CCNC: 55 U/L (ref 40–130)
ALT SERPL-CCNC: 19 U/L (ref 0–33)
ANION GAP SERPL CALCULATED.3IONS-SCNC: 8 MEQ/L (ref 9–15)
AST SERPL-CCNC: 38 U/L (ref 0–35)
BACTERIA BLD CULT ORG #2: NORMAL
BACTERIA BLD CULT: NORMAL
BASOPHILS # BLD: 0.1 K/UL (ref 0–0.2)
BASOPHILS NFR BLD: 2 %
BILIRUB SERPL-MCNC: 0.3 MG/DL (ref 0.2–0.7)
BUN SERPL-MCNC: 12 MG/DL (ref 8–23)
CALCIUM SERPL-MCNC: 8.3 MG/DL (ref 8.5–9.9)
CHLORIDE SERPL-SCNC: 109 MEQ/L (ref 95–107)
CO2 SERPL-SCNC: 26 MEQ/L (ref 20–31)
CREAT SERPL-MCNC: 1.02 MG/DL (ref 0.5–0.9)
EOSINOPHIL # BLD: 0.4 K/UL (ref 0–0.7)
EOSINOPHIL NFR BLD: 5 %
ERYTHROCYTE [DISTWIDTH] IN BLOOD BY AUTOMATED COUNT: 12.6 % (ref 11.5–14.5)
GLOBULIN SER CALC-MCNC: 2.2 G/DL (ref 2.3–3.5)
GLUCOSE BLD-MCNC: 96 MG/DL (ref 70–99)
GLUCOSE SERPL-MCNC: 98 MG/DL (ref 70–99)
HCT VFR BLD AUTO: 33 % (ref 37–47)
HGB BLD-MCNC: 11.5 G/DL (ref 12–16)
LYMPHOCYTES # BLD: 1.5 K/UL (ref 1–4.8)
LYMPHOCYTES NFR BLD: 19 %
MAGNESIUM SERPL-MCNC: 1.9 MG/DL (ref 1.7–2.4)
MCH RBC QN AUTO: 30.7 PG (ref 27–31.3)
MCHC RBC AUTO-ENTMCNC: 34.8 % (ref 33–37)
MCV RBC AUTO: 88.2 FL (ref 79.4–94.8)
METAMYELOCYTES NFR BLD MANUAL: 1 %
MONOCYTES # BLD: 0.4 K/UL (ref 0.2–0.8)
MONOCYTES NFR BLD: 4.8 %
MYELOCYTES NFR BLD MANUAL: 3 %
NEUTROPHILS # BLD: 5.1 K/UL (ref 1.4–6.5)
NEUTS SEG NFR BLD: 65 %
NRBC BLD-RTO: 1 /100 WBC
PERFORMED ON: NORMAL
PLATELET # BLD AUTO: 181 K/UL (ref 130–400)
PLATELET BLD QL SMEAR: ADEQUATE
POTASSIUM SERPL-SCNC: 3.9 MEQ/L (ref 3.4–4.9)
PROT SERPL-MCNC: 5.5 G/DL (ref 6.3–8)
RBC # BLD AUTO: 3.74 M/UL (ref 4.2–5.4)
SLIDE REVIEW: ABNORMAL
SMUDGE CELLS BLD QL SMEAR: 7.6
SODIUM SERPL-SCNC: 143 MEQ/L (ref 135–144)
VARIANT LYMPHS NFR BLD: 1 %
WBC # BLD AUTO: 7.4 K/UL (ref 4.8–10.8)

## 2024-08-11 PROCEDURE — 85025 COMPLETE CBC W/AUTO DIFF WBC: CPT

## 2024-08-11 PROCEDURE — 6370000000 HC RX 637 (ALT 250 FOR IP): Performed by: INTERNAL MEDICINE

## 2024-08-11 PROCEDURE — 80053 COMPREHEN METABOLIC PANEL: CPT

## 2024-08-11 PROCEDURE — 36415 COLL VENOUS BLD VENIPUNCTURE: CPT

## 2024-08-11 PROCEDURE — 83735 ASSAY OF MAGNESIUM: CPT

## 2024-08-11 RX ADMIN — LEVOTHYROXINE SODIUM 75 MCG: 0.07 TABLET ORAL at 06:52

## 2024-08-11 RX ADMIN — CALCIUM CARBONATE-VITAMIN D TAB 500 MG-200 UNIT 1 TABLET: 500-200 TAB at 08:33

## 2024-08-11 RX ADMIN — METRONIDAZOLE 500 MG: 500 TABLET ORAL at 06:50

## 2024-08-11 ASSESSMENT — PAIN SCALES - GENERAL: PAINLEVEL_OUTOF10: 0

## 2024-08-11 NOTE — PLAN OF CARE
Problem: Discharge Planning  Goal: Discharge to home or other facility with appropriate resources  8/11/2024 0937 by Annabella Bright RN  Outcome: Completed  8/11/2024 0033 by Lizbeth Talamantes RN  Outcome: Progressing     Problem: Pain  Goal: Verbalizes/displays adequate comfort level or baseline comfort level  8/11/2024 0937 by Annabella Bright RN  Outcome: Completed  8/11/2024 0033 by Lizbeth Talamantes RN  Outcome: Progressing     Problem: Safety - Adult  Goal: Free from fall injury  8/11/2024 0937 by Annabella Bright RN  Outcome: Completed  8/11/2024 0033 by Lizbeth Talamantes RN  Outcome: Progressing     Problem: Skin/Tissue Integrity  Goal: Absence of new skin breakdown  Description: 1.  Monitor for areas of redness and/or skin breakdown  2.  Assess vascular access sites hourly  3.  Every 4-6 hours minimum:  Change oxygen saturation probe site  4.  Every 4-6 hours:  If on nasal continuous positive airway pressure, respiratory therapy assess nares and determine need for appliance change or resting period.  8/11/2024 0937 by Annabella Bright RN  Outcome: Completed  8/11/2024 0033 by Lizbeth Talamantes RN  Outcome: Progressing

## 2024-08-11 NOTE — PROGRESS NOTES
Shift assessments complete, see flowsheets. Pt alert and oriented x 4. Medication administered per MAR, VSS. Pt able to make needs and wants known. No further needs verbalized at this time call light left within reach.    5611 Dr. Taylor at bedside explaining results to Pt and daughter that was called by pt from her cellphone.

## 2024-08-12 ENCOUNTER — TELEPHONE (OUTPATIENT)
Dept: FAMILY MEDICINE CLINIC | Age: 84
End: 2024-08-12

## 2024-08-12 ENCOUNTER — CARE COORDINATION (OUTPATIENT)
Dept: CARE COORDINATION | Age: 84
End: 2024-08-12

## 2024-08-12 NOTE — TELEPHONE ENCOUNTER
Attempted to call & schedule for a hospital follow up appointment.     LMOVM to call back to schedule appt.

## 2024-08-12 NOTE — CARE COORDINATION
Care Transitions Note    Initial Call - Call within 2 business days of discharge: Yes    Attempted to reach patient for transitions of care follow up. Unable to reach patient.    Outreach Attempts:   HIPAA compliant voicemail left for patient.     Patient: Deidra Caban    Patient : 1940   MRN: [id number]    Reason for Admission: Colitis   Discharge Date: 24  RURS: Readmission Risk Score: 11.3    Last Discharge Facility       Date Complaint Diagnosis Description Type Department Provider    24 Illness Leukocytosis, unspecified type ... ED to Hosp-Admission (Discharged) (ADMITTED) [facility] NICK Davidson Lott, ; Amarjit Cortez...   Follow Up Appointment:   Patient does not have a follow up appointment scheduled at time of call. Will route a message to Dr. Sharp's office requesting that an attempt be made to contact the patient to schedule TCM visit within 7 days of discharge, discharge date 24.     Future Appointments         Provider Specialty Dept Phone    10/9/2024 9:30 AM Tamika Sharp DO Family Medicine [phone]        Capri Moralez RN

## 2024-08-12 NOTE — TELEPHONE ENCOUNTER
I already attempted to call patient today to schedule her. I left her a VM requesting she call back to schedule. Please try again later today or tomorrow to reach patient & schedule.

## 2024-08-13 ENCOUNTER — CARE COORDINATION (OUTPATIENT)
Dept: CARE COORDINATION | Age: 84
End: 2024-08-13

## 2024-08-13 NOTE — CARE COORDINATION
Care Transitions Note    Initial Call - Call within 2 business days of discharge: Yes    Outreach Attempts:   2nd attempt to reach the patient for initial Care Transition call post hospital discharge. HIPAA compliant message left with CTN's contact information requesting return phone call.     Per chart review the patient is not  active on [employer], unable to reach letter pended and CTN will route to Capri Bennett, , to mail to the patients listed address.   No further outreaches will be attempted.    If no return call by the end of today, CTN will  sign off and resolve  CT program.    Patient: Deidra Caban    Patient : 1940   MRN: [id number]    Reason for Admission: Leukocytosis; colitis  Discharge Date: 24  RURS: Readmission Risk Score: 11.3    Last Discharge Facility       Date Complaint Diagnosis Description Type Department Provider    24 Illness Leukocytosis, unspecified type ... ED to Hosp-Admission (Discharged) (ADMITTED) [facility] NICK Davidson Lott, ; Amarjit Cortez...            Was this an external facility discharge? No    Follow Up Appointment:   Patient does not have a follow up appointment scheduled at time of call. Office has attempted to reach pt on 24 and LVM to return their call to schedule.   Future Appointments         Provider Specialty Dept Phone    10/9/2024 9:30 AM Tamika Sharp DO Family Medicine [phone]            No further follow-up call indicated     Annette Barney RN

## 2024-08-14 ENCOUNTER — OFFICE VISIT (OUTPATIENT)
Dept: FAMILY MEDICINE CLINIC | Age: 84
End: 2024-08-14

## 2024-08-14 VITALS
HEART RATE: 84 BPM | TEMPERATURE: 97.5 F | DIASTOLIC BLOOD PRESSURE: 62 MMHG | WEIGHT: 163 LBS | SYSTOLIC BLOOD PRESSURE: 134 MMHG | HEIGHT: 62 IN | OXYGEN SATURATION: 97 % | BODY MASS INDEX: 30 KG/M2

## 2024-08-14 DIAGNOSIS — K52.9 COLITIS: ICD-10-CM

## 2024-08-14 DIAGNOSIS — I77.1 STENOSIS OF CELIAC ARTERY (HCC): ICD-10-CM

## 2024-08-14 DIAGNOSIS — Z09 HOSPITAL DISCHARGE FOLLOW-UP: Primary | ICD-10-CM

## 2024-08-14 LAB
ALBUMIN SERPL-MCNC: 4.3 G/DL (ref 3.5–4.6)
ALP SERPL-CCNC: 60 U/L (ref 40–130)
ALT SERPL-CCNC: 29 U/L (ref 0–33)
ANION GAP SERPL CALCULATED.3IONS-SCNC: 11 MEQ/L (ref 9–15)
AST SERPL-CCNC: 49 U/L (ref 0–35)
BASOPHILS # BLD: 0.1 K/UL (ref 0–0.2)
BASOPHILS NFR BLD: 0.6 %
BILIRUB SERPL-MCNC: 0.3 MG/DL (ref 0.2–0.7)
BUN SERPL-MCNC: 9 MG/DL (ref 8–23)
CALCIUM SERPL-MCNC: 9.2 MG/DL (ref 8.5–9.9)
CHLORIDE SERPL-SCNC: 103 MEQ/L (ref 95–107)
CO2 SERPL-SCNC: 26 MEQ/L (ref 20–31)
CREAT SERPL-MCNC: 0.94 MG/DL (ref 0.5–0.9)
EOSINOPHIL # BLD: 0.2 K/UL (ref 0–0.7)
EOSINOPHIL NFR BLD: 1.8 %
ERYTHROCYTE [DISTWIDTH] IN BLOOD BY AUTOMATED COUNT: 13.4 % (ref 11.5–14.5)
GLOBULIN SER CALC-MCNC: 2.7 G/DL (ref 2.3–3.5)
GLUCOSE SERPL-MCNC: 127 MG/DL (ref 70–99)
HCT VFR BLD AUTO: 37 % (ref 37–47)
HGB BLD-MCNC: 12.4 G/DL (ref 12–16)
LYMPHOCYTES # BLD: 1.4 K/UL (ref 1–4.8)
LYMPHOCYTES NFR BLD: 14.9 %
MCH RBC QN AUTO: 30.8 PG (ref 27–31.3)
MCHC RBC AUTO-ENTMCNC: 33.5 % (ref 33–37)
MCV RBC AUTO: 92 FL (ref 79.4–94.8)
MONOCYTES # BLD: 0.9 K/UL (ref 0.2–0.8)
MONOCYTES NFR BLD: 9.6 %
NEUTROPHILS # BLD: 6.9 K/UL (ref 1.4–6.5)
NEUTS SEG NFR BLD: 71 %
PLATELET # BLD AUTO: 306 K/UL (ref 130–400)
POTASSIUM SERPL-SCNC: 3.9 MEQ/L (ref 3.4–4.9)
PROT SERPL-MCNC: 7 G/DL (ref 6.3–8)
RBC # BLD AUTO: 4.02 M/UL (ref 4.2–5.4)
SODIUM SERPL-SCNC: 140 MEQ/L (ref 135–144)
WBC # BLD AUTO: 9.7 K/UL (ref 4.8–10.8)

## 2024-08-14 ASSESSMENT — ENCOUNTER SYMPTOMS
WHEEZING: 0
ABDOMINAL PAIN: 0
COUGH: 0
DIARRHEA: 0
NAUSEA: 0
SHORTNESS OF BREATH: 0
RHINORRHEA: 0
VOMITING: 0
SORE THROAT: 0
EYE DISCHARGE: 0

## 2024-08-14 NOTE — PROGRESS NOTES
Gastrointestinal:  Negative for abdominal pain, diarrhea, nausea and vomiting.   Genitourinary:  Negative for difficulty urinating.   Musculoskeletal:  Negative for arthralgias and joint swelling.   Skin:  Negative for rash.   Neurological:  Negative for weakness, light-headedness, numbness and headaches.   Psychiatric/Behavioral:  Negative for confusion and suicidal ideas. The patient is not nervous/anxious.        Objective:    /62   Pulse 84   Temp 97.5 °F (36.4 °C)   Ht 1.562 m (5' 1.5\")   Wt 73.9 kg (163 lb)   SpO2 97%   BMI 30.30 kg/m²   Physical Exam  Vitals reviewed.   Constitutional:       General: She is not in acute distress.  HENT:      Head: Normocephalic and atraumatic.   Eyes:      Conjunctiva/sclera: Conjunctivae normal.   Neck:      Thyroid: No thyromegaly or thyroid tenderness.   Cardiovascular:      Rate and Rhythm: Normal rate and regular rhythm.      Heart sounds: No murmur heard.     No friction rub. No gallop.   Pulmonary:      Effort: Pulmonary effort is normal.      Breath sounds: Normal breath sounds. No wheezing, rhonchi or rales.   Abdominal:      General: Bowel sounds are normal. There is no distension.      Palpations: Abdomen is soft.      Tenderness: There is no abdominal tenderness.   Musculoskeletal:         General: No swelling or deformity.      Cervical back: Normal range of motion and neck supple.      Right lower leg: No edema.      Left lower leg: No edema.   Lymphadenopathy:      Cervical: No cervical adenopathy.   Skin:     General: Skin is warm and dry.      Findings: No rash.   Neurological:      General: No focal deficit present.      Mental Status: She is alert.      Gait: Gait is intact.   Psychiatric:         Mood and Affect: Mood normal.         Behavior: Behavior normal.         An electronic signature was used to authenticate this note.  --Tamika Sharp DO

## 2024-09-17 ENCOUNTER — OFFICE VISIT (OUTPATIENT)
Dept: FAMILY MEDICINE CLINIC | Age: 84
End: 2024-09-17
Payer: MEDICARE

## 2024-09-17 VITALS
HEART RATE: 74 BPM | TEMPERATURE: 98 F | SYSTOLIC BLOOD PRESSURE: 110 MMHG | BODY MASS INDEX: 29.44 KG/M2 | DIASTOLIC BLOOD PRESSURE: 62 MMHG | WEIGHT: 160 LBS | OXYGEN SATURATION: 96 % | HEIGHT: 62 IN

## 2024-09-17 DIAGNOSIS — M81.0 OSTEOPOROSIS, UNSPECIFIED OSTEOPOROSIS TYPE, UNSPECIFIED PATHOLOGICAL FRACTURE PRESENCE: ICD-10-CM

## 2024-09-17 DIAGNOSIS — E03.4 HYPOTHYROIDISM DUE TO ACQUIRED ATROPHY OF THYROID: ICD-10-CM

## 2024-09-17 DIAGNOSIS — I77.1 STENOSIS OF CELIAC ARTERY (HCC): ICD-10-CM

## 2024-09-17 DIAGNOSIS — I10 ESSENTIAL HYPERTENSION: Primary | ICD-10-CM

## 2024-09-17 DIAGNOSIS — N18.31 CHRONIC KIDNEY DISEASE, STAGE 3A (HCC): ICD-10-CM

## 2024-09-17 DIAGNOSIS — E78.2 MIXED HYPERLIPIDEMIA: ICD-10-CM

## 2024-09-17 PROCEDURE — 1123F ACP DISCUSS/DSCN MKR DOCD: CPT | Performed by: STUDENT IN AN ORGANIZED HEALTH CARE EDUCATION/TRAINING PROGRAM

## 2024-09-17 PROCEDURE — 3074F SYST BP LT 130 MM HG: CPT | Performed by: STUDENT IN AN ORGANIZED HEALTH CARE EDUCATION/TRAINING PROGRAM

## 2024-09-17 PROCEDURE — 3078F DIAST BP <80 MM HG: CPT | Performed by: STUDENT IN AN ORGANIZED HEALTH CARE EDUCATION/TRAINING PROGRAM

## 2024-09-17 PROCEDURE — 99214 OFFICE O/P EST MOD 30 MIN: CPT | Performed by: STUDENT IN AN ORGANIZED HEALTH CARE EDUCATION/TRAINING PROGRAM

## 2024-09-17 RX ORDER — LISINOPRIL 10 MG/1
TABLET ORAL
Qty: 90 TABLET | Refills: 3 | Status: SHIPPED | OUTPATIENT
Start: 2024-09-17

## 2024-09-17 RX ORDER — LOVASTATIN 40 MG
TABLET ORAL
Qty: 90 TABLET | Refills: 3 | Status: SHIPPED | OUTPATIENT
Start: 2024-09-17

## 2024-09-17 ASSESSMENT — ENCOUNTER SYMPTOMS
ABDOMINAL PAIN: 0
EYE DISCHARGE: 0
DIARRHEA: 0
NAUSEA: 0
WHEEZING: 0
SORE THROAT: 0
COUGH: 0
SHORTNESS OF BREATH: 0
RHINORRHEA: 0
VOMITING: 0

## 2024-11-07 DIAGNOSIS — M81.0 OSTEOPOROSIS, UNSPECIFIED OSTEOPOROSIS TYPE, UNSPECIFIED PATHOLOGICAL FRACTURE PRESENCE: ICD-10-CM

## 2024-11-07 RX ORDER — ALENDRONATE SODIUM 70 MG/1
TABLET ORAL
Qty: 12 TABLET | Refills: 0 | Status: SHIPPED | OUTPATIENT
Start: 2024-11-07

## 2024-11-07 NOTE — TELEPHONE ENCOUNTER
Pharmacy requesting medication refill. Please approve or deny this request.    Rx requested:  Requested Prescriptions     Pending Prescriptions Disp Refills    alendronate (FOSAMAX) 70 MG tablet [Pharmacy Med Name: ALENDRONATE SODIUM 70 MG TAB] 12 tablet 0     Sig: TAKE 1 TABLET BY MOUTH ONE TIME PER WEEK         Last Office Visit:   9/17/2024      Next Visit Date:  Future Appointments   Date Time Provider Department Center   3/17/2025 12:30 PM Tamika Sharp DO [facility] DEP

## 2025-01-14 DIAGNOSIS — I10 ESSENTIAL HYPERTENSION: ICD-10-CM

## 2025-01-14 NOTE — TELEPHONE ENCOUNTER
Patient called requesting medication refill. Please approve or deny this request.    Rx requested:  Requested Prescriptions     Pending Prescriptions Disp Refills    lisinopril (PRINIVIL;ZESTRIL) 10 MG tablet 90 tablet 3     Sig: TAKE 1 TABLET BY MOUTH EVERY DAY         Last Office Visit:   9/17/2024      Next Visit Date:  Future Appointments   Date Time Provider Department Center   3/17/2025 12:30 PM Tamika Sharp DO [facility] DEP

## 2025-01-15 RX ORDER — LISINOPRIL 10 MG/1
TABLET ORAL
Qty: 90 TABLET | Refills: 3 | Status: SHIPPED | OUTPATIENT
Start: 2025-01-15

## 2025-01-15 NOTE — TELEPHONE ENCOUNTER
Future Appointments    Encounter Information   Provider Department Appt Notes   3/17/2025 Tamika Sharp,  [facility] 6 Month Follow Up     Past Visits    Date Provider Specialty Visit Type Primary Dx   09/17/2024 Tamika Sharp,  Family Medicine Office Visit Essential hypertension   08/14/2024 Tamika Sharp DO Family Medicine Office Visit Hospital discharge follow-up   08/09/2024 Isabelel Smith MD Endoscopy Surgery    04/09/2024 Tamika Sharp DO Family Medicine Office Visit Medicare annual wellness visit, subsequent   04/09/2024 Tamika Sharp DO Family Medicine Office Visit Essential hypertension

## 2025-02-15 DIAGNOSIS — E03.4 HYPOTHYROIDISM DUE TO ACQUIRED ATROPHY OF THYROID: ICD-10-CM

## 2025-02-17 ENCOUNTER — OFFICE VISIT (OUTPATIENT)
Age: 85
End: 2025-02-17
Payer: MEDICARE

## 2025-02-17 VITALS
HEIGHT: 62 IN | DIASTOLIC BLOOD PRESSURE: 70 MMHG | SYSTOLIC BLOOD PRESSURE: 120 MMHG | WEIGHT: 160.2 LBS | BODY MASS INDEX: 29.48 KG/M2 | OXYGEN SATURATION: 94 % | TEMPERATURE: 97.1 F | HEART RATE: 61 BPM

## 2025-02-17 DIAGNOSIS — R06.2 WHEEZING: ICD-10-CM

## 2025-02-17 DIAGNOSIS — J10.1 INFLUENZA A: Primary | ICD-10-CM

## 2025-02-17 LAB
INFLUENZA A ANTIBODY: NORMAL
INFLUENZA B ANTIBODY: NORMAL
Lab: NORMAL
PERFORMING INSTRUMENT: NORMAL
QC PASS/FAIL: NORMAL
SARS-COV-2, POC: NORMAL

## 2025-02-17 PROCEDURE — 99213 OFFICE O/P EST LOW 20 MIN: CPT | Performed by: FAMILY MEDICINE

## 2025-02-17 PROCEDURE — 87804 INFLUENZA ASSAY W/OPTIC: CPT | Performed by: FAMILY MEDICINE

## 2025-02-17 PROCEDURE — 1123F ACP DISCUSS/DSCN MKR DOCD: CPT | Performed by: FAMILY MEDICINE

## 2025-02-17 PROCEDURE — 1160F RVW MEDS BY RX/DR IN RCRD: CPT | Performed by: FAMILY MEDICINE

## 2025-02-17 PROCEDURE — 87426 SARSCOV CORONAVIRUS AG IA: CPT | Performed by: FAMILY MEDICINE

## 2025-02-17 PROCEDURE — 1159F MED LIST DOCD IN RCRD: CPT | Performed by: FAMILY MEDICINE

## 2025-02-17 RX ORDER — LEVOTHYROXINE SODIUM 75 UG/1
75 TABLET ORAL DAILY
Qty: 90 TABLET | Refills: 0 | Status: SHIPPED | OUTPATIENT
Start: 2025-02-17

## 2025-02-17 RX ORDER — OSELTAMIVIR PHOSPHATE 75 MG/1
75 CAPSULE ORAL 2 TIMES DAILY
Qty: 10 CAPSULE | Refills: 0 | Status: SHIPPED | OUTPATIENT
Start: 2025-02-17 | End: 2025-02-22

## 2025-02-17 RX ORDER — ALBUTEROL SULFATE 90 UG/1
2 INHALANT RESPIRATORY (INHALATION) EVERY 6 HOURS PRN
Qty: 18 G | Refills: 0 | Status: SHIPPED | OUTPATIENT
Start: 2025-02-17

## 2025-02-17 RX ORDER — AZITHROMYCIN 250 MG/1
TABLET, FILM COATED ORAL
Qty: 6 TABLET | Refills: 0 | Status: SHIPPED | OUTPATIENT
Start: 2025-02-17 | End: 2025-02-27

## 2025-02-17 SDOH — ECONOMIC STABILITY: FOOD INSECURITY: WITHIN THE PAST 12 MONTHS, YOU WORRIED THAT YOUR FOOD WOULD RUN OUT BEFORE YOU GOT MONEY TO BUY MORE.: NEVER TRUE

## 2025-02-17 SDOH — ECONOMIC STABILITY: FOOD INSECURITY: WITHIN THE PAST 12 MONTHS, THE FOOD YOU BOUGHT JUST DIDN'T LAST AND YOU DIDN'T HAVE MONEY TO GET MORE.: NEVER TRUE

## 2025-02-17 ASSESSMENT — PATIENT HEALTH QUESTIONNAIRE - PHQ9
SUM OF ALL RESPONSES TO PHQ QUESTIONS 1-9: 0
SUM OF ALL RESPONSES TO PHQ9 QUESTIONS 1 & 2: 0
SUM OF ALL RESPONSES TO PHQ QUESTIONS 1-9: 0
SUM OF ALL RESPONSES TO PHQ QUESTIONS 1-9: 0
2. FEELING DOWN, DEPRESSED OR HOPELESS: NOT AT ALL
1. LITTLE INTEREST OR PLEASURE IN DOING THINGS: NOT AT ALL
SUM OF ALL RESPONSES TO PHQ QUESTIONS 1-9: 0

## 2025-02-17 ASSESSMENT — ENCOUNTER SYMPTOMS
WHEEZING: 1
ABDOMINAL PAIN: 0
COUGH: 1

## 2025-02-17 NOTE — PROGRESS NOTES
Subjective:      Patient ID: Deidra Caban is a 84 y.o. female    Cough  Associated symptoms include wheezing. Pertinent negatives include no rash.     Here with acute visit. Routinely seen by .  here with family.   Here with nasal drainage and cough which started on Friday night.  .  Cough is mostly dry.  Started having some wheezing over the weekend.  No sore throat.  No fever or chills.  No emesis or diarrhea    Review of Systems   Constitutional:  Positive for appetite change.   Respiratory:  Positive for cough and wheezing.    Gastrointestinal:  Negative for abdominal pain.   Skin:  Negative for rash.     Reviewed allergy, medical, social, surgical, family and med list changes and updated   Files     Social History     Socioeconomic History    Marital status:      Spouse name: None    Number of children: None    Years of education: None    Highest education level: None   Tobacco Use    Smoking status: Never    Smokeless tobacco: Never   Vaping Use    Vaping status: Never Used   Substance and Sexual Activity    Alcohol use: No    Drug use: No    Sexual activity: Never     Social Determinants of Health     Financial Resource Strain: Low Risk  (4/9/2024)    Overall Financial Resource Strain (CARDIA)     Difficulty of Paying Living Expenses: Not very hard   Food Insecurity: No Food Insecurity (2/17/2025)    Hunger Vital Sign     Worried About Running Out of Food in the Last Year: Never true     Ran Out of Food in the Last Year: Never true   Transportation Needs: No Transportation Needs (2/17/2025)    PRAPARE - Transportation     Lack of Transportation (Medical): No     Lack of Transportation (Non-Medical): No   Physical Activity: Inactive (4/9/2024)    Exercise Vital Sign     Days of Exercise per Week: 0 days     Minutes of Exercise per Session: 0 min   Housing Stability: Low Risk  (2/17/2025)    Housing Stability Vital Sign     Unable to Pay for Housing in the Last Year: No     Number of

## 2025-02-25 ENCOUNTER — OFFICE VISIT (OUTPATIENT)
Age: 85
End: 2025-02-25

## 2025-02-25 VITALS
WEIGHT: 159 LBS | OXYGEN SATURATION: 97 % | SYSTOLIC BLOOD PRESSURE: 128 MMHG | TEMPERATURE: 97.4 F | BODY MASS INDEX: 29.26 KG/M2 | HEIGHT: 62 IN | DIASTOLIC BLOOD PRESSURE: 64 MMHG | HEART RATE: 66 BPM

## 2025-02-25 DIAGNOSIS — M81.0 OSTEOPOROSIS, UNSPECIFIED OSTEOPOROSIS TYPE, UNSPECIFIED PATHOLOGICAL FRACTURE PRESENCE: Primary | ICD-10-CM

## 2025-02-25 DIAGNOSIS — Z00.00 MEDICARE ANNUAL WELLNESS VISIT, SUBSEQUENT: ICD-10-CM

## 2025-02-25 DIAGNOSIS — N18.31 CHRONIC KIDNEY DISEASE, STAGE 3A (HCC): ICD-10-CM

## 2025-02-25 RX ORDER — ALENDRONATE SODIUM 70 MG/1
TABLET ORAL
Qty: 12 TABLET | Refills: 3 | Status: SHIPPED | OUTPATIENT
Start: 2025-02-25

## 2025-02-25 ASSESSMENT — PATIENT HEALTH QUESTIONNAIRE - PHQ9
SUM OF ALL RESPONSES TO PHQ QUESTIONS 1-9: 0
SUM OF ALL RESPONSES TO PHQ QUESTIONS 1-9: 0
2. FEELING DOWN, DEPRESSED OR HOPELESS: NOT AT ALL
1. LITTLE INTEREST OR PLEASURE IN DOING THINGS: NOT AT ALL
SUM OF ALL RESPONSES TO PHQ QUESTIONS 1-9: 0
SUM OF ALL RESPONSES TO PHQ QUESTIONS 1-9: 0
SUM OF ALL RESPONSES TO PHQ9 QUESTIONS 1 & 2: 0

## 2025-02-25 NOTE — PROGRESS NOTES
Subjective  Deidra Caban, 84 y.o. female presents today with:  Chief Complaint   Patient presents with    Cough     Tested positive for Influenza A on 2/17/25. States she continues to have a cough, that is productive, thick & white in color. Denies SOB or wheezing. Denies fevers.     Medicare AWV       History of Present Illness  The patient is an 84-year-old female who presents for follow-up.    She reports a significant improvement in her condition following the administration of prescribed medications, including tamiflu and azithromycin. She was sick last week with flu a. She experiences occasional coughing but does not have any shortness of breath. The cough is productive, yielding thick white mucus, which she is able to expectorate without difficulty. She has been utilizing an albuterol inhaler, which she finds beneficial. She does not report any recent episodes of fever or chills. She has been managing her symptoms with cough drops and cough medicine.    She is seeking a refill of her alendronate prescription, with only one dose remaining for the following day. She has no other concerns at this time.         Past Medical History:   Diagnosis Date    Allergic rhinitis     Carotid artery occlusion     Carpal tunnel syndrome, bilateral     Colitis, ischemic     DJD (degenerative joint disease)     H/O: psoriasis     History of bad fall 2009    MULTIPLE CONTUSIONS    History of diverticulitis of colon     History of kidney stones     History of mammography, screening 2010    CAT 2    History of osteopenia     History of shingles     Hyperlipidemia     Hypertension     Hypothyroidism     Renal failure     MILD     Past Surgical History:   Procedure Laterality Date    BREAST SURGERY  80'S    R BREAST LUMPECTOMY    COLONOSCOPY  01/01/2010    SIGMOID DIVERTICULOSIS    COLONOSCOPY N/A 8/9/2024    COLONOSCOPY with BIOPSY performed by Isabelle Smith MD at [facility]    HYSTERECTOMY (CERVIX STATUS

## 2025-03-14 ENCOUNTER — RESULTS FOLLOW-UP (OUTPATIENT)
Dept: FAMILY MEDICINE CLINIC | Age: 85
End: 2025-03-14

## 2025-03-14 DIAGNOSIS — N18.31 CHRONIC KIDNEY DISEASE, STAGE 3A (HCC): ICD-10-CM

## 2025-03-14 DIAGNOSIS — E03.4 HYPOTHYROIDISM DUE TO ACQUIRED ATROPHY OF THYROID: ICD-10-CM

## 2025-03-14 DIAGNOSIS — I10 ESSENTIAL HYPERTENSION: ICD-10-CM

## 2025-03-14 DIAGNOSIS — E78.2 MIXED HYPERLIPIDEMIA: ICD-10-CM

## 2025-03-14 LAB
ALBUMIN SERPL-MCNC: 4.7 G/DL (ref 3.5–4.6)
ALP SERPL-CCNC: 58 U/L (ref 40–130)
ALT SERPL-CCNC: 12 U/L (ref 0–33)
ANION GAP SERPL CALCULATED.3IONS-SCNC: 8 MEQ/L (ref 9–15)
AST SERPL-CCNC: 21 U/L (ref 0–35)
BASOPHILS # BLD: 0.1 K/UL (ref 0–0.2)
BASOPHILS NFR BLD: 1.1 %
BILIRUB SERPL-MCNC: 0.7 MG/DL (ref 0.2–0.7)
BUN SERPL-MCNC: 22 MG/DL (ref 8–23)
CALCIUM SERPL-MCNC: 9.6 MG/DL (ref 8.5–9.9)
CHLORIDE SERPL-SCNC: 101 MEQ/L (ref 95–107)
CHOLEST SERPL-MCNC: 183 MG/DL (ref 0–199)
CO2 SERPL-SCNC: 28 MEQ/L (ref 20–31)
CREAT SERPL-MCNC: 0.95 MG/DL (ref 0.5–0.9)
EOSINOPHIL # BLD: 0.1 K/UL (ref 0–0.7)
EOSINOPHIL NFR BLD: 1.8 %
ERYTHROCYTE [DISTWIDTH] IN BLOOD BY AUTOMATED COUNT: 12.6 % (ref 11.5–14.5)
GLOBULIN SER CALC-MCNC: 3 G/DL (ref 2.3–3.5)
GLUCOSE FASTING: 101 MG/DL (ref 70–99)
HCT VFR BLD AUTO: 36.4 % (ref 37–47)
HDLC SERPL-MCNC: 76 MG/DL (ref 40–59)
HGB BLD-MCNC: 12.3 G/DL (ref 12–16)
LDL CHOLESTEROL: 88 MG/DL (ref 0–129)
LYMPHOCYTES # BLD: 1.5 K/UL (ref 1–4.8)
LYMPHOCYTES NFR BLD: 33.4 %
MCH RBC QN AUTO: 30.3 PG (ref 27–31.3)
MCHC RBC AUTO-ENTMCNC: 33.8 % (ref 33–37)
MCV RBC AUTO: 89.7 FL (ref 79.4–94.8)
MONOCYTES # BLD: 0.5 K/UL (ref 0.2–0.8)
MONOCYTES NFR BLD: 11.4 %
NEUTROPHILS # BLD: 2.3 K/UL (ref 1.4–6.5)
NEUTS SEG NFR BLD: 51.9 %
PLATELET # BLD AUTO: 199 K/UL (ref 130–400)
POTASSIUM SERPL-SCNC: 4.7 MEQ/L (ref 3.4–4.9)
PROT SERPL-MCNC: 7.7 G/DL (ref 6.3–8)
RBC # BLD AUTO: 4.06 M/UL (ref 4.2–5.4)
SODIUM SERPL-SCNC: 137 MEQ/L (ref 135–144)
TRIGLYCERIDE, FASTING: 96 MG/DL (ref 0–150)
TSH REFLEX: 1.1 UIU/ML (ref 0.44–3.86)
WBC # BLD AUTO: 4.5 K/UL (ref 4.8–10.8)

## 2025-03-17 ENCOUNTER — OFFICE VISIT (OUTPATIENT)
Age: 85
End: 2025-03-17
Payer: MEDICARE

## 2025-03-17 VITALS
HEIGHT: 62 IN | BODY MASS INDEX: 29.63 KG/M2 | OXYGEN SATURATION: 99 % | TEMPERATURE: 97.8 F | HEART RATE: 63 BPM | DIASTOLIC BLOOD PRESSURE: 72 MMHG | SYSTOLIC BLOOD PRESSURE: 138 MMHG | WEIGHT: 161 LBS

## 2025-03-17 DIAGNOSIS — N18.31 CHRONIC KIDNEY DISEASE, STAGE 3A (HCC): ICD-10-CM

## 2025-03-17 DIAGNOSIS — E03.4 HYPOTHYROIDISM DUE TO ACQUIRED ATROPHY OF THYROID: ICD-10-CM

## 2025-03-17 DIAGNOSIS — M81.0 OSTEOPOROSIS, UNSPECIFIED OSTEOPOROSIS TYPE, UNSPECIFIED PATHOLOGICAL FRACTURE PRESENCE: ICD-10-CM

## 2025-03-17 DIAGNOSIS — I10 ESSENTIAL HYPERTENSION: Primary | ICD-10-CM

## 2025-03-17 PROCEDURE — 99214 OFFICE O/P EST MOD 30 MIN: CPT | Performed by: STUDENT IN AN ORGANIZED HEALTH CARE EDUCATION/TRAINING PROGRAM

## 2025-03-17 PROCEDURE — 1159F MED LIST DOCD IN RCRD: CPT | Performed by: STUDENT IN AN ORGANIZED HEALTH CARE EDUCATION/TRAINING PROGRAM

## 2025-03-17 PROCEDURE — 1160F RVW MEDS BY RX/DR IN RCRD: CPT | Performed by: STUDENT IN AN ORGANIZED HEALTH CARE EDUCATION/TRAINING PROGRAM

## 2025-03-17 PROCEDURE — G2211 COMPLEX E/M VISIT ADD ON: HCPCS | Performed by: STUDENT IN AN ORGANIZED HEALTH CARE EDUCATION/TRAINING PROGRAM

## 2025-03-17 PROCEDURE — 1123F ACP DISCUSS/DSCN MKR DOCD: CPT | Performed by: STUDENT IN AN ORGANIZED HEALTH CARE EDUCATION/TRAINING PROGRAM

## 2025-03-17 RX ORDER — LEVOTHYROXINE SODIUM 75 UG/1
75 TABLET ORAL DAILY
Qty: 90 TABLET | Refills: 3 | Status: SHIPPED | OUTPATIENT
Start: 2025-03-17

## 2025-03-17 NOTE — PROGRESS NOTES
for coordination of care.  I have reviewed the patient's medical history in detail and updated the computerized patient record.    Please note, this report has been partially produced using speech recognition software and may cause  and /or contain errors related to that system including grammar, punctuation and spelling as well as words and phrases that may seem inappropriate. If there are questions or concerns please feel free to contact me to clarify.    The patient (or guardian, if applicable) and other individuals in attendance with the patient were advised that Artificial Intelligence will be utilized during this visit to record, process the conversation to generate a clinical note, and support improvement of the AI technology. The patient (or guardian, if applicable) and other individuals in attendance at the appointment consented to the use of AI, including the recording.      Tamika Sharp DO

## (undated) DEVICE — SINGLE PORT MANIFOLD: Brand: NEPTUNE 2

## (undated) DEVICE — TUBING IRRIGATION 140/160/180/190 SER GI ENDOSCP SMARTCAP

## (undated) DEVICE — FORCEPS BX L240CM JAW DIA2.4MM ORNG L CAP W/ NDL DISP RAD

## (undated) DEVICE — ENDO CARRY-ON PROCEDURE KIT: Brand: ENDO CARRY-ON PROCEDURE KIT

## (undated) DEVICE — TUBE SET 96 MM 64 MM H2O PERISTALTIC STD AUX CHANNEL

## (undated) DEVICE — BRUSH ENDO CLN L90.5IN SHTH DIA1.7MM BRIST DIA5-7MM 2-6MM

## (undated) DEVICE — TUBING, SUCTION, 1/4" X 10', STRAIGHT: Brand: MEDLINE